# Patient Record
Sex: FEMALE | Race: WHITE | Employment: UNEMPLOYED | URBAN - METROPOLITAN AREA
[De-identification: names, ages, dates, MRNs, and addresses within clinical notes are randomized per-mention and may not be internally consistent; named-entity substitution may affect disease eponyms.]

---

## 2017-07-20 ENCOUNTER — OFFICE VISIT (OUTPATIENT)
Dept: FAMILY MEDICINE CLINIC | Age: 57
End: 2017-07-20

## 2017-07-20 VITALS
WEIGHT: 124 LBS | HEIGHT: 64 IN | RESPIRATION RATE: 16 BRPM | DIASTOLIC BLOOD PRESSURE: 64 MMHG | SYSTOLIC BLOOD PRESSURE: 122 MMHG | BODY MASS INDEX: 21.17 KG/M2 | OXYGEN SATURATION: 97 % | HEART RATE: 85 BPM

## 2017-07-20 DIAGNOSIS — Z12.4 SCREENING FOR CERVICAL CANCER: ICD-10-CM

## 2017-07-20 DIAGNOSIS — Z13.1 SCREENING FOR DIABETES MELLITUS: ICD-10-CM

## 2017-07-20 DIAGNOSIS — Z86.711 HISTORY OF PULMONARY EMBOLISM: ICD-10-CM

## 2017-07-20 DIAGNOSIS — Z80.3 FH: BREAST CANCER: ICD-10-CM

## 2017-07-20 DIAGNOSIS — E78.2 MIXED HYPERLIPIDEMIA: ICD-10-CM

## 2017-07-20 DIAGNOSIS — F17.200 SEVERE TOBACCO DEPENDENCE: ICD-10-CM

## 2017-07-20 DIAGNOSIS — R14.0 POSTPRANDIAL ABDOMINAL BLOATING: ICD-10-CM

## 2017-07-20 DIAGNOSIS — E04.2 MULTINODULAR GOITER: ICD-10-CM

## 2017-07-20 DIAGNOSIS — J43.1 PANLOBULAR EMPHYSEMA (HCC): Primary | ICD-10-CM

## 2017-07-20 LAB
ALBUMIN SERPL-MCNC: 4.2 G/DL (ref 3.4–5)
ALP BLD-CCNC: 109 U/L (ref 40–129)
ALT SERPL-CCNC: 7 U/L (ref 10–40)
AMYLASE: 41 U/L (ref 25–115)
ANION GAP SERPL CALCULATED.3IONS-SCNC: 15 MMOL/L (ref 3–16)
AST SERPL-CCNC: 17 U/L (ref 15–37)
BILIRUB SERPL-MCNC: 0.3 MG/DL (ref 0–1)
BILIRUBIN DIRECT: <0.2 MG/DL (ref 0–0.3)
BILIRUBIN, INDIRECT: ABNORMAL MG/DL (ref 0–1)
BUN BLDV-MCNC: 3 MG/DL (ref 7–20)
CALCIUM SERPL-MCNC: 9.2 MG/DL (ref 8.3–10.6)
CHLORIDE BLD-SCNC: 102 MMOL/L (ref 99–110)
CHOLESTEROL, TOTAL: 291 MG/DL (ref 0–199)
CO2: 27 MMOL/L (ref 21–32)
CREAT SERPL-MCNC: 0.5 MG/DL (ref 0.6–1.1)
GFR AFRICAN AMERICAN: >60
GFR NON-AFRICAN AMERICAN: >60
GLUCOSE BLD-MCNC: 96 MG/DL (ref 70–99)
HCT VFR BLD CALC: 42 % (ref 36–48)
HDLC SERPL-MCNC: 50 MG/DL (ref 40–60)
HEMOGLOBIN: 13.9 G/DL (ref 12–16)
LDL CHOLESTEROL CALCULATED: 208 MG/DL
MCH RBC QN AUTO: 32.3 PG (ref 26–34)
MCHC RBC AUTO-ENTMCNC: 33.1 G/DL (ref 31–36)
MCV RBC AUTO: 97.8 FL (ref 80–100)
PDW BLD-RTO: 14.4 % (ref 12.4–15.4)
PLATELET # BLD: 238 K/UL (ref 135–450)
PMV BLD AUTO: 9.7 FL (ref 5–10.5)
POTASSIUM SERPL-SCNC: 4.4 MMOL/L (ref 3.5–5.1)
RBC # BLD: 4.3 M/UL (ref 4–5.2)
SODIUM BLD-SCNC: 144 MMOL/L (ref 136–145)
TOTAL PROTEIN: 7 G/DL (ref 6.4–8.2)
TRIGL SERPL-MCNC: 165 MG/DL (ref 0–150)
TSH REFLEX FT4: 1.11 UIU/ML (ref 0.27–4.2)
VLDLC SERPL CALC-MCNC: 33 MG/DL
WBC # BLD: 5.5 K/UL (ref 4–11)

## 2017-07-20 PROCEDURE — 36415 COLL VENOUS BLD VENIPUNCTURE: CPT | Performed by: INTERNAL MEDICINE

## 2017-07-20 PROCEDURE — 94060 EVALUATION OF WHEEZING: CPT | Performed by: INTERNAL MEDICINE

## 2017-07-20 PROCEDURE — 94640 AIRWAY INHALATION TREATMENT: CPT | Performed by: INTERNAL MEDICINE

## 2017-07-20 PROCEDURE — 99214 OFFICE O/P EST MOD 30 MIN: CPT | Performed by: INTERNAL MEDICINE

## 2017-07-20 RX ORDER — BUDESONIDE AND FORMOTEROL FUMARATE DIHYDRATE 160; 4.5 UG/1; UG/1
2 AEROSOL RESPIRATORY (INHALATION) 2 TIMES DAILY
Qty: 1 INHALER | Refills: 3 | Status: SHIPPED | OUTPATIENT
Start: 2017-07-20 | End: 2017-11-28 | Stop reason: SDUPTHER

## 2017-07-20 RX ORDER — OMEPRAZOLE 20 MG/1
20 TABLET, DELAYED RELEASE ORAL DAILY
Qty: 30 TABLET | Refills: 3 | Status: SHIPPED | OUTPATIENT
Start: 2017-07-20 | End: 2018-12-11 | Stop reason: ALTCHOICE

## 2017-07-20 RX ORDER — BUPROPION HYDROCHLORIDE 150 MG/1
TABLET ORAL
Qty: 30 TABLET | Refills: 0 | Status: SHIPPED | OUTPATIENT
Start: 2017-07-20 | End: 2017-08-08 | Stop reason: SDUPTHER

## 2017-07-20 RX ORDER — ALBUTEROL SULFATE 1.25 MG/3ML
1.25 SOLUTION RESPIRATORY (INHALATION) ONCE
Status: COMPLETED | OUTPATIENT
Start: 2017-07-20 | End: 2017-07-20

## 2017-07-20 RX ADMIN — ALBUTEROL SULFATE 1.25 MG: 1.25 SOLUTION RESPIRATORY (INHALATION) at 09:58

## 2017-07-20 ASSESSMENT — PATIENT HEALTH QUESTIONNAIRE - PHQ9
SUM OF ALL RESPONSES TO PHQ9 QUESTIONS 1 & 2: 1
1. LITTLE INTEREST OR PLEASURE IN DOING THINGS: 1
SUM OF ALL RESPONSES TO PHQ QUESTIONS 1-9: 1
2. FEELING DOWN, DEPRESSED OR HOPELESS: 0

## 2017-07-21 ENCOUNTER — NURSE ONLY (OUTPATIENT)
Dept: FAMILY MEDICINE CLINIC | Age: 57
End: 2017-07-21

## 2017-07-21 DIAGNOSIS — R14.0 POSTPRANDIAL ABDOMINAL BLOATING: ICD-10-CM

## 2017-07-21 LAB
CONTROL: NORMAL
ESTIMATED AVERAGE GLUCOSE: 108.3 MG/DL
HBA1C MFR BLD: 5.4 %
HEMOCCULT STL QL: NEGATIVE

## 2017-07-21 PROCEDURE — 82274 ASSAY TEST FOR BLOOD FECAL: CPT | Performed by: INTERNAL MEDICINE

## 2017-07-23 LAB — H PYLORI ANTIGEN STOOL: NEGATIVE

## 2017-07-24 ENCOUNTER — HOSPITAL ENCOUNTER (OUTPATIENT)
Dept: ULTRASOUND IMAGING | Age: 57
Discharge: HOME | End: 2017-07-24
Attending: INTERNAL MEDICINE | Admitting: INTERNAL MEDICINE

## 2017-07-24 DIAGNOSIS — R14.0 ABDOMINAL BLOATING: Primary | ICD-10-CM

## 2017-07-24 DIAGNOSIS — E04.2 MULTINODULAR GOITER: ICD-10-CM

## 2017-07-24 DIAGNOSIS — Z80.3 FH: BREAST CANCER: ICD-10-CM

## 2017-07-24 LAB
HPV COMMENT: NORMAL
HPV TYPE 16: NOT DETECTED
HPV TYPE 18: NOT DETECTED
HPVOH (OTHER TYPES): NOT DETECTED

## 2017-07-25 PROBLEM — F17.200 TOBACCO DEPENDENCE: Status: ACTIVE | Noted: 2017-07-25

## 2017-07-25 PROBLEM — J43.1 PANLOBULAR EMPHYSEMA (HCC): Status: ACTIVE | Noted: 2017-07-25

## 2017-07-25 PROBLEM — E04.2 MULTINODULAR THYROID: Status: ACTIVE | Noted: 2017-07-25

## 2017-07-25 PROBLEM — R10.13 DYSPEPSIA: Status: ACTIVE | Noted: 2017-07-25

## 2017-07-26 DIAGNOSIS — R92.8 ABNORMAL MAMMOGRAM: Primary | ICD-10-CM

## 2017-07-31 PROBLEM — R92.8 ABNORMAL SCREENING MAMMOGRAM: Status: ACTIVE | Noted: 2017-07-31

## 2017-08-01 ENCOUNTER — OFFICE VISIT (OUTPATIENT)
Dept: FAMILY MEDICINE CLINIC | Age: 57
End: 2017-08-01

## 2017-08-01 VITALS
HEART RATE: 80 BPM | WEIGHT: 125 LBS | SYSTOLIC BLOOD PRESSURE: 118 MMHG | RESPIRATION RATE: 24 BRPM | HEIGHT: 64 IN | OXYGEN SATURATION: 96 % | DIASTOLIC BLOOD PRESSURE: 70 MMHG | BODY MASS INDEX: 21.34 KG/M2

## 2017-08-01 DIAGNOSIS — F17.200 TOBACCO DEPENDENCE: ICD-10-CM

## 2017-08-01 DIAGNOSIS — R92.8 ABNORMAL SCREENING MAMMOGRAM: ICD-10-CM

## 2017-08-01 DIAGNOSIS — R10.13 DYSPEPSIA: ICD-10-CM

## 2017-08-01 DIAGNOSIS — J43.1 PANLOBULAR EMPHYSEMA (HCC): ICD-10-CM

## 2017-08-01 DIAGNOSIS — E78.01 FAMILIAL HYPERCHOLESTEROLEMIA: Primary | ICD-10-CM

## 2017-08-01 DIAGNOSIS — E04.2 MULTINODULAR THYROID: ICD-10-CM

## 2017-08-01 PROCEDURE — 99214 OFFICE O/P EST MOD 30 MIN: CPT | Performed by: INTERNAL MEDICINE

## 2017-08-01 RX ORDER — METOPROLOL SUCCINATE 25 MG/1
TABLET, EXTENDED RELEASE ORAL
Qty: 90 TABLET | Refills: 3 | Status: SHIPPED | OUTPATIENT
Start: 2017-08-01 | End: 2017-08-01

## 2017-08-01 RX ORDER — ATORVASTATIN CALCIUM 40 MG/1
TABLET, FILM COATED ORAL
Qty: 90 TABLET | Refills: 3 | Status: SHIPPED | OUTPATIENT
Start: 2017-08-01 | End: 2017-11-13 | Stop reason: SDUPTHER

## 2017-08-14 ENCOUNTER — HOSPITAL ENCOUNTER (OUTPATIENT)
Dept: WOMENS IMAGING | Age: 57
Discharge: HOME | End: 2017-08-14
Admitting: INTERNAL MEDICINE

## 2017-08-14 DIAGNOSIS — R92.8 ABNORMAL MAMMOGRAM: ICD-10-CM

## 2017-09-05 RX ORDER — BUPROPION HYDROCHLORIDE 150 MG/1
TABLET ORAL
Qty: 60 TABLET | Refills: 0 | OUTPATIENT
Start: 2017-09-05

## 2017-09-05 RX ORDER — BUPROPION HYDROCHLORIDE 150 MG/1
TABLET ORAL
Qty: 60 TABLET | Refills: 1 | Status: SHIPPED | OUTPATIENT
Start: 2017-09-05 | End: 2017-10-02 | Stop reason: SDUPTHER

## 2017-09-18 ENCOUNTER — OFFICE VISIT (OUTPATIENT)
Dept: FAMILY MEDICINE CLINIC | Age: 57
End: 2017-09-18

## 2017-09-18 VITALS
BODY MASS INDEX: 21.17 KG/M2 | SYSTOLIC BLOOD PRESSURE: 102 MMHG | DIASTOLIC BLOOD PRESSURE: 68 MMHG | HEART RATE: 112 BPM | HEIGHT: 64 IN | OXYGEN SATURATION: 94 % | RESPIRATION RATE: 18 BRPM | WEIGHT: 124 LBS | TEMPERATURE: 98.6 F

## 2017-09-18 DIAGNOSIS — J44.1 COPD WITH EXACERBATION (HCC): Primary | ICD-10-CM

## 2017-09-18 DIAGNOSIS — J43.1 PANLOBULAR EMPHYSEMA (HCC): ICD-10-CM

## 2017-09-18 DIAGNOSIS — R10.13 DYSPEPSIA: ICD-10-CM

## 2017-09-18 DIAGNOSIS — E78.5 HYPERLIPIDEMIA, UNSPECIFIED HYPERLIPIDEMIA TYPE: ICD-10-CM

## 2017-09-18 DIAGNOSIS — E04.2 MULTINODULAR THYROID: ICD-10-CM

## 2017-09-18 LAB
ALBUMIN SERPL-MCNC: 4.2 G/DL (ref 3.4–5)
ALP BLD-CCNC: 134 U/L (ref 40–129)
ALT SERPL-CCNC: 11 U/L (ref 10–40)
AST SERPL-CCNC: 18 U/L (ref 15–37)
BILIRUB SERPL-MCNC: 0.3 MG/DL (ref 0–1)
BILIRUBIN DIRECT: <0.2 MG/DL (ref 0–0.3)
BILIRUBIN, INDIRECT: ABNORMAL MG/DL (ref 0–1)
CHOLESTEROL, TOTAL: 224 MG/DL (ref 0–199)
HDLC SERPL-MCNC: 55 MG/DL (ref 40–60)
LDL CHOLESTEROL CALCULATED: 143 MG/DL
TOTAL PROTEIN: 6.9 G/DL (ref 6.4–8.2)
TRIGL SERPL-MCNC: 132 MG/DL (ref 0–150)
VLDLC SERPL CALC-MCNC: 26 MG/DL

## 2017-09-18 PROCEDURE — 94640 AIRWAY INHALATION TREATMENT: CPT | Performed by: INTERNAL MEDICINE

## 2017-09-18 PROCEDURE — 99214 OFFICE O/P EST MOD 30 MIN: CPT | Performed by: INTERNAL MEDICINE

## 2017-09-18 PROCEDURE — 36415 COLL VENOUS BLD VENIPUNCTURE: CPT | Performed by: INTERNAL MEDICINE

## 2017-09-18 RX ORDER — ALBUTEROL SULFATE 1.25 MG/3ML
1.25 SOLUTION RESPIRATORY (INHALATION) ONCE
Status: COMPLETED | OUTPATIENT
Start: 2017-09-18 | End: 2017-09-18

## 2017-09-18 RX ORDER — PREDNISONE 20 MG/1
TABLET ORAL
Qty: 15 TABLET | Refills: 0 | Status: SHIPPED | OUTPATIENT
Start: 2017-09-18 | End: 2017-10-02 | Stop reason: ALTCHOICE

## 2017-09-18 RX ORDER — AZITHROMYCIN 250 MG/1
TABLET, FILM COATED ORAL
Qty: 1 PACKET | Refills: 0 | Status: SHIPPED | OUTPATIENT
Start: 2017-09-18 | End: 2021-04-28 | Stop reason: SDUPTHER

## 2017-09-18 RX ADMIN — ALBUTEROL SULFATE 1.25 MG: 1.25 SOLUTION RESPIRATORY (INHALATION) at 09:38

## 2017-09-19 ENCOUNTER — TELEPHONE (OUTPATIENT)
Dept: FAMILY MEDICINE CLINIC | Age: 57
End: 2017-09-19

## 2017-09-19 RX ORDER — GUAIFENESIN AND CODEINE PHOSPHATE 100; 10 MG/5ML; MG/5ML
5 SOLUTION ORAL 3 TIMES DAILY PRN
Qty: 120 ML | Refills: 0 | Status: SHIPPED | OUTPATIENT
Start: 2017-09-19 | End: 2017-09-26

## 2017-10-02 ENCOUNTER — OFFICE VISIT (OUTPATIENT)
Dept: FAMILY MEDICINE CLINIC | Age: 57
End: 2017-10-02

## 2017-10-02 VITALS
RESPIRATION RATE: 12 BRPM | HEART RATE: 77 BPM | WEIGHT: 125 LBS | BODY MASS INDEX: 21.34 KG/M2 | SYSTOLIC BLOOD PRESSURE: 112 MMHG | DIASTOLIC BLOOD PRESSURE: 64 MMHG | OXYGEN SATURATION: 98 % | HEIGHT: 64 IN

## 2017-10-02 DIAGNOSIS — R92.8 ABNORMAL SCREENING MAMMOGRAM: ICD-10-CM

## 2017-10-02 DIAGNOSIS — R10.13 DYSPEPSIA: ICD-10-CM

## 2017-10-02 DIAGNOSIS — Z23 NEED FOR INFLUENZA VACCINATION: ICD-10-CM

## 2017-10-02 DIAGNOSIS — E04.2 MULTINODULAR THYROID: ICD-10-CM

## 2017-10-02 DIAGNOSIS — F17.200 TOBACCO DEPENDENCE: ICD-10-CM

## 2017-10-02 DIAGNOSIS — J43.1 PANLOBULAR EMPHYSEMA (HCC): Primary | ICD-10-CM

## 2017-10-02 PROCEDURE — 99214 OFFICE O/P EST MOD 30 MIN: CPT | Performed by: INTERNAL MEDICINE

## 2017-10-02 PROCEDURE — 90688 IIV4 VACCINE SPLT 0.5 ML IM: CPT | Performed by: INTERNAL MEDICINE

## 2017-10-02 PROCEDURE — 90471 IMMUNIZATION ADMIN: CPT | Performed by: INTERNAL MEDICINE

## 2017-10-02 RX ORDER — BUPROPION HYDROCHLORIDE 150 MG/1
TABLET ORAL
Qty: 60 TABLET | Refills: 1 | Status: SHIPPED | OUTPATIENT
Start: 2017-10-02 | End: 2017-11-28 | Stop reason: SDUPTHER

## 2017-10-02 NOTE — PROGRESS NOTES
Vaccine Information Sheet, \"Influenza - Inactivated\"  given to Tamy Gonzalez, or parent/legal guardian of  Tamy Gonzalez and verbalized understanding. Patient responses:    Have you ever had a reaction to a flu vaccine? No  Are you able to eat eggs without adverse effects? Yes  Do you have any current illness? No  Have you ever had Guillian [place] Syndrome? No    Flu vaccine given per order. Please see immunization tab.       Given in left deltoid-BR

## 2017-10-02 NOTE — PATIENT INSTRUCTIONS
Learning About COPD Triggers  What are triggers? When you have COPD (chronic obstructive pulmonary disease), certain things can make your symptoms worse. These are called triggers. They include:  · Cigarette smoke or air pollution. · Illnesses like colds, flu, or pneumonia. · Cleaning supplies or other chemicals. · Gases, particles, or fumes from wood or kerosene home heaters. Not all people have the same triggers. What may cause symptoms in one person may not be a problem for another person. How do triggers affect COPD? Triggers can make it harder for your lungs to work as they should and can lead to sudden difficulty breathing and other symptoms. When you are around a trigger, a COPD flare-up is more likely. If your symptoms are severe, you may need emergency treatment or have to go to the hospital for treatment. If you know what your triggers are and can avoid them, you can reduce how often you have flare-ups and how much COPD affects your life. It's also important to be active and to take your daily medicines as prescribed. This helps prevent flare-ups too. What can you do to avoid triggers? The first thing is to know your triggers. When you are having symptoms, note the things around you that might be causing them. Then look for patterns in what may be triggering your symptoms. When you have your list of possible triggers, work with your doctor to find ways to avoid them. Here are some ways to avoid a few common triggers. · Do not smoke or allow others to smoke around you. If you need help quitting, talk to your doctor about stop-smoking programs and medicines. These can increase your chances of quitting for good. · If there is a lot of pollution, pollen, or dust outside, stay at home and keep your windows closed. Use an air conditioner or air filter in your home. Check your local weather report or newspaper for air quality and pollen reports. · Get a flu vaccine every year.  Talk to your doctor about getting a pneumococcal shot. Wash your hands often to prevent infections. How can you manage a flare-up? Do not panic if you start to have a COPD flare-up. · If you have a COPD action plan, follow the plan. In general:  ¨ Use your quick-relief inhaler as directed by your doctor. If your symptoms do not get better after you use your medicine, have someone take you to the emergency room. Call an ambulance if needed. ¨ Use a spacer with your metered-dose inhaler (MDI). If you have a nebulizer for inhaled medicine, use it. A spacer or nebulizer may help get more medicine to your lungs. ¨ If your doctor has given you other inhaled medicines or steroid pills, take them as directed. ¨ If your doctor has given you a prescription for an antibiotic, fill it if you need to. ¨ Call your doctor if you have to use your antibiotic or steroid pills. Where can you learn more? Go to https://Girly Stuff.YourSports. org and sign in to your Zarfo account. Enter U496 in the Identification Solutions box to learn more about \"Learning About COPD Triggers. \"     If you do not have an account, please click on the \"Sign Up Now\" link. Current as of: March 25, 2017  Content Version: 11.3  © 4697-2428 Lovejuice. Care instructions adapted under license by [facility] ([facility]). If you have questions about a medical condition or this instruction, always ask your healthcare professional. [place] [address] any warranty or liability for your use of this information. Chronic Obstructive Pulmonary Disease (COPD): Care Instructions  Your Care Instructions    Chronic obstructive pulmonary disease (COPD) is a general term for a group of lung diseases, including emphysema and chronic bronchitis. People with COPD have decreased airflow in and out of the lungs, which makes it hard to breathe. The airways also can get clogged with thick mucus. Cigarette smoking is a major cause of COPD.   Although there example, call if:  · You have severe trouble breathing. Call your doctor now or seek immediate medical care if:  · You have new or worse trouble breathing. · You cough up blood. · You have a fever. Watch closely for changes in your health, and be sure to contact your doctor if:  · You cough more deeply or more often, especially if you notice more mucus or a change in the color of your mucus. · You have new or worse swelling in your legs or belly. · You are not getting better as expected. Where can you learn more? Go to https://Cardiolapepiceweb.This Week In. org and sign in to your Mozzo Analytics account. Enter G635 in the [employer] box to learn more about \"Chronic Obstructive Pulmonary Disease (COPD): Care Instructions. \"     If you do not have an account, please click on the \"Sign Up Now\" link. Current as of: March 25, 2017  Content Version: 11.3  © 9841-1395 Syndero. Care instructions adapted under license by [facility] ([facility]). If you have questions about a medical condition or this instruction, always ask your healthcare professional. [place] [address] any warranty or liability for your use of this information. Adjustment Disorder: Care Instructions  Your Care Instructions  Adjustment disorder means that you have emotional or behavioral problems because of stress. But your response to the stress is far more severe than a normal response. It is severe enough to affect your work or social life and may cause depression and physical pains and problems. Events that may cause this response can include a divorce, money problems, or starting school or a new job. It might be anything that causes some stress. This disorder is most often a short-term problem. It happens within 3 months of the stressful event or change. If the response lasts longer than 6 months after the event ends, you may have a more serious disorder.   Follow-up care is a key part of your treatment and

## 2017-10-02 NOTE — MR AVS SNAPSHOT
After Visit Summary             Shakila Ramsey   10/2/2017 8:00 AM   Office Visit    Description:  Female : 1960   Provider:  Villa Enriquez MD   Department:  [address] and Future Appointments         Below is a list of your follow-up and future appointments. This may not be a complete list as you may have made appointments directly with providers that we are not aware of or your providers may have made some for you. Please call your providers to confirm appointments. It is important to keep your appointments. Please bring your current insurance card, photo ID, co-pay, and all medication bottles to your appointment. If self-pay, payment is expected at the time of service. Your To-Do List     Future Appointments Provider Department Dept Phone    10/30/2017 9:30 AM Italo Geiger MD; [address] [facility] [phone]    Follow-Up    Return in about 6 weeks (around 2017). Information from Your Visit        Department     Name Address Phone Fax    [address] [facility], [place] [address] [phone]      You Were Seen for:         Comments    Panlobular emphysema [facility])   [[id number]]         Vital Signs     Blood Pressure Pulse Respirations Height Weight Oxygen Saturation    112/64 77 12 5' 4\" (1.626 m) 125 lb (56.7 kg) 98%    Body Mass Index Smoking Status                21.46 kg/m2 Current Every Day Smoker          Instructions         Learning About COPD Triggers  What are triggers? When you have COPD (chronic obstructive pulmonary disease), certain things can make your symptoms worse. These are called triggers. They include:  · Cigarette smoke or air pollution. · Illnesses like colds, flu, or pneumonia. · Cleaning supplies or other chemicals. · Gases, particles, or fumes from wood or kerosene home heaters. Not all people have the same triggers. What may cause symptoms in one person may not be a problem for another person. How do triggers affect COPD? Triggers can make it harder for your lungs to work as they should and can lead to sudden difficulty breathing and other symptoms. When you are around a trigger, a COPD flare-up is more likely. If your symptoms are severe, you may need emergency treatment or have to go to the hospital for treatment. If you know what your triggers are and can avoid them, you can reduce how often you have flare-ups and how much COPD affects your life. It's also important to be active and to take your daily medicines as prescribed. This helps prevent flare-ups too. What can you do to avoid triggers? The first thing is to know your triggers. When you are having symptoms, note the things around you that might be causing them. Then look for patterns in what may be triggering your symptoms. When you have your list of possible triggers, work with your doctor to find ways to avoid them. Here are some ways to avoid a few common triggers. · Do not smoke or allow others to smoke around you. If you need help quitting, talk to your doctor about stop-smoking programs and medicines. These can increase your chances of quitting for good. · If there is a lot of pollution, pollen, or dust outside, stay at home and keep your windows closed. Use an air conditioner or air filter in your home. Check your local weather report or newspaper for air quality and pollen reports. · Get a flu vaccine every year. Talk to your doctor about getting a pneumococcal shot. Wash your hands often to prevent infections. How can you manage a flare-up? Do not panic if you start to have a COPD flare-up. · If you have a COPD action plan, follow the plan. In general:  ¨ Use your quick-relief inhaler as directed by your doctor.  If your symptoms do not get better after you use your medicine, have someone take · Oxygen therapy boosts the amount of oxygen in your blood and helps you breathe easier. Use the flow rate your doctor has recommended, and do not change it without talking to your doctor first.  Activity  · Get regular exercise. Walking is an easy way to get exercise. Start out slowly, and walk a little more each day. · Pay attention to your breathing. You are exercising too hard if you cannot talk while you are exercising. · Take short rest breaks when doing household chores and other activities. · Learn breathing methodssuch as breathing through pursed lipsto help you become less short of breath. · If your doctor has not set you up with a pulmonary rehabilitation program, talk to him or her about whether rehab is right for you. Rehab includes exercise programs, education about your disease and how to manage it, help with diet and other changes, and emotional support. Diet  · Eat regular, healthy meals. Use bronchodilators about 1 hour before you eat to make it easier to eat. Eat several small meals instead of three large ones. Drink beverages at the end of the meal. Avoid foods that are hard to chew. · Eat foods that contain protein so that you do not lose muscle mass. · Talk with your doctor if you gain too much weight or if you lose weight without trying. Mental health  · Talk to your family, friends, or a therapist about your feelings. It is normal to feel frightened, angry, hopeless, helpless, and even guilty. Talking openly about bad feelings can help you cope. If these feelings last, talk to your doctor. When should you call for help? Call 911 anytime you think you may need emergency care. For example, call if:  · You have severe trouble breathing. Call your doctor now or seek immediate medical care if:  · You have new or worse trouble breathing. · You cough up blood. · You have a fever.   Watch closely for changes in your health, and be sure to contact your doctor if: · You cough more deeply or more often, especially if you notice more mucus or a change in the color of your mucus. · You have new or worse swelling in your legs or belly. · You are not getting better as expected. Where can you learn more? Go to https://asiya.HN Discounts Corporation. org and sign in to your Tackkt account. Enter I777 in the skyrockit box to learn more about \"Chronic Obstructive Pulmonary Disease (COPD): Care Instructions. \"     If you do not have an account, please click on the \"Sign Up Now\" link. Current as of: March 25, 2017  Content Version: 11.3  © 5428-7061 Smart Living Studios. Care instructions adapted under license by [facility] ([facility]). If you have questions about a medical condition or this instruction, always ask your healthcare professional. [address] any warranty or liability for your use of this information. Adjustment Disorder: Care Instructions  Your Care Instructions  Adjustment disorder means that you have emotional or behavioral problems because of stress. But your response to the stress is far more severe than a normal response. It is severe enough to affect your work or social life and may cause depression and physical pains and problems. Events that may cause this response can include a divorce, money problems, or starting school or a new job. It might be anything that causes some stress. This disorder is most often a short-term problem. It happens within 3 months of the stressful event or change. If the response lasts longer than 6 months after the event ends, you may have a more serious disorder. Follow-up care is a key part of your treatment and safety. Be sure to make and go to all appointments, and call your doctor if you are having problems. It's also a good idea to know your test results and keep a list of the medicines you take. How can you care for yourself at home? 9. Click Sign Up. You can now view your medical record. Additional Information  If you have questions, please contact the physician practice where you receive care. Remember, [employer] is NOT to be used for urgent needs. For medical emergencies, dial 911. For questions regarding your Double the Donation account call [phone]. If you have a clinical question, please call your doctor's office.

## 2017-10-02 NOTE — PROGRESS NOTES
HPI: Vince Self presents for follow up up breathing. Plan lobar emphysema with FEV1 1.04. Takes Symbicort daily. Exacerbation improved within 48 hours of prednisone using antibiotic. She's gone from 2 packs a day of tobacco to one every 2-3 days. Positive postnasal drip. No hemoptysis. No more palpitations. Feeling more like her normal self. Some exercise. Son is no longer ill. Plans to continue cutting back. agrees to flu vaccine. No adverse effects of bupropion. History of unprovoked pulmonary embolism transit Coumadin use. No family history of coagulopathy. No recurrent issues. . Had a CTA in May 2017 which showed multilobar emphysema.       Abdominal bloating post prandial for years x 30 years. Is intermittent. Emesis at least monthly. Stools loose. No blood in stool. No nocturnal stools. Post prandial stools. 3 stools daily. No weight loss. No alcohol. No anemia. Is following up with GI later this month. Continue PPI.       Native LDL of 200 down to 134 after 6 weeks of statin. No adverse effect. Liver functions stable.      History of depression. Underwent outpatient therapy 5 years ago. States bupropion has not made any impact.       vaginal delivery without complication. No gestational diabetes or hypertension. Tubal ligation. Early menopause. Last Pap 16 was normal. Sexually active. Sister with breast cancer. No family history of ovarian cancer BI-RADS 3 mammogram. Recheck 2018     Euthyroid multinodular goiter.           PMH:    Childbirth     PE  20's     pneumonia     Tubal      SH:  son and grandson at home.  + tobacco Positive exercise.      FH 3 brother 3 sisters    + breast cancer in sister 2 [facility]     -colon, ovarian cancer      View of systems: No falls. No concerns with memory issues. No seizures. Edentulous. Due eye exam. No known cataracts. Bloating, loose stools, wheeze, postnasal drip, worry and depression. Intermittent vomiting. No GYN concerns.  No chest pain, palpitations, syncope.       12 point ROS reviewed and negative other than mentioned above  Allergies   Allergen Reactions    Pcn [Penicillins]        Outpatient Prescriptions Marked as Taking for the 10/2/17 encounter (Office Visit) with Norma Duggan MD   Medication Sig Dispense Refill    buPROPion (WELLBUTRIN XL) 150 MG extended release tablet 1 po bid due follow up on medication 60 tablet 1    atorvastatin (LIPITOR) 40 MG tablet 1 po q day follow up on medication fasting in 2 months 90 tablet 3    omeprazole (PRILOSEC OTC) 20 MG tablet Take 1 tablet by mouth daily 30 tablet 3    budesonide-formoterol (SYMBICORT) 160-4.5 MCG/ACT AERO Inhale 2 puffs into the lungs 2 times daily 1 Inhaler 3    albuterol sulfate  (90 BASE) MCG/ACT inhaler Use 2 puffs 4 times daily for 7 days then as needed for wheezing. Dispense with Spacer and instruct in use. At patient's preference may use 60 dose MDI. May Sub Pro-Air or Proventil as needed per insurance. 1 Inhaler 1             Past Medical History:   Diagnosis Date    Abnormal screening mammogram 7/31/2017    Dx right pending    Anxiety     Depression     Multinodular thyroid 7/25/2017 7.2017 euthyroid recheck 1 year       Past Surgical History:   Procedure Laterality Date    TONSILLECTOMY           Social History     Social History    Marital status:      Spouse name: N/A    Number of children: N/A    Years of education: N/A     Occupational History    Not on file. Social History Main Topics    Smoking status: Current Every Day Smoker     Packs/day: 3.00     Types: Cigarettes    Smokeless tobacco: Never Used    Alcohol use No    Drug use: No    Sexual activity: Yes     Partners: Male     Other Topics Concern    Not on file     Social History Narrative       No family history on file.         Review of Systems    Objective     /64  Pulse 77  Resp 12  Ht 5' 4\" (1.626 m)  Wt 125 lb (56.7 kg)  SpO2 98% Comment: MACKENZIE BMI 21.46 kg/m2    SpO2 Readings from Last 3 Encounters:   10/02/17 98%   09/18/17 94%   08/01/17 96%       Wt Readings from Last 3 Encounters:   10/02/17 125 lb (56.7 kg)   09/18/17 124 lb (56.2 kg)   08/01/17 125 lb (56.7 kg)       Physical Exam     NAD alert and cooperative  HEENT:Edentulous. Mild erythema posterior pharynx. No anterior cervical adenopathy. Positive goiter. Lungs increased I:E ratio with decreased breath sounds. No wheezes rales or rhonchi  Cardiovascular exam regular rate and rhythm without murmur  No hepatosplenomegaly. No point tenderness. No peripheral edema or cords. No suspicious skin rashes. Or nodules.       Chemistry        Component Value Date/Time     07/20/2017 0945    K 4.4 07/20/2017 0945     07/20/2017 0945    CO2 27 07/20/2017 0945    BUN 3 (L) 07/20/2017 0945    CREATININE 0.5 (L) 07/20/2017 0945        Component Value Date/Time    CALCIUM 9.2 07/20/2017 0945    ALKPHOS 134 (H) 09/18/2017 0901    AST 18 09/18/2017 0901    ALT 11 09/18/2017 0901    BILITOT 0.3 09/18/2017 0901            Lab Results   Component Value Date    WBC 5.5 07/20/2017    HGB 13.9 07/20/2017    HCT 42.0 07/20/2017    MCV 97.8 07/20/2017     07/20/2017     Lab Results   Component Value Date    LABA1C 5.4 07/20/2017     Lab Results   Component Value Date    .3 07/20/2017     Lab Results   Component Value Date    LABA1C 5.4 07/20/2017     No components found for: CHLPL  Lab Results   Component Value Date    TRIG 132 09/18/2017    TRIG 165 (H) 07/20/2017    TRIG 182 (H) 03/13/2015     Lab Results   Component Value Date    HDL 55 09/18/2017    HDL 50 07/20/2017    HDL 49 03/13/2015     Lab Results   Component Value Date    LDLCALC 143 (H) 09/18/2017    LDLCALC 208 (H) 07/20/2017    LDLCALC 183 (H) 03/13/2015     Lab Results   Component Value Date    LABVLDL 26 09/18/2017    LABVLDL 33 07/20/2017    LABVLDL 36 03/13/2015       Old labs and records reviewed or requested  Discussed past

## 2017-10-26 ENCOUNTER — PAT TELEPHONE (OUTPATIENT)
Dept: PREADMISSION TESTING | Age: 57
End: 2017-10-26

## 2017-10-26 ENCOUNTER — SURG/PROC ORDERS (OUTPATIENT)
Dept: ANESTHESIOLOGY | Age: 57
End: 2017-10-26

## 2017-10-26 VITALS — WEIGHT: 125 LBS | BODY MASS INDEX: 21.34 KG/M2 | HEIGHT: 64 IN

## 2017-10-26 RX ORDER — SODIUM CHLORIDE 0.9 % (FLUSH) 0.9 %
10 SYRINGE (ML) INJECTION PRN
Status: CANCELLED | OUTPATIENT
Start: 2017-10-26

## 2017-10-26 RX ORDER — SODIUM CHLORIDE 0.9 % (FLUSH) 0.9 %
10 SYRINGE (ML) INJECTION EVERY 12 HOURS SCHEDULED
Status: CANCELLED | OUTPATIENT
Start: 2017-10-26

## 2017-10-26 RX ORDER — SODIUM CHLORIDE 9 MG/ML
INJECTION, SOLUTION INTRAVENOUS CONTINUOUS
Status: CANCELLED | OUTPATIENT
Start: 2017-10-26

## 2017-10-26 NOTE — PRE-PROCEDURE INSTRUCTIONS
[address]  time____8 pt states________        Surgery time____________    Take the following medications with a sip of water: prilosec and inhalers    Do not eat or drink anything after 12:00 midnight prior to your surgery. This includes water chewing gum, mints and ice chips. You may brush your teeth and gargle the morning of your surgery, but do not swallow the water     Please see your family doctor/pediatrician for a history and physical and/or concerning medications. Bring any test results/reports from your physicians office. If you are under the care of a heart doctor or specialist doctor, please be aware that you may be asked to them for clearance    You may be asked to stop blood thinners such as Coumadin, Plavix, Fragmin, Lovenox, etc., or any anti-inflammatories such as:  Aspirin, Ibuprofen, Advil, Naproxen prior to your surgery. We also ask that you stop any OTC medications such as fish oil, vitamin E, glucosamine, garlic, Multivitamins, COQ 10, etc.    We ask that you do not smoke 24 hours prior to surgery  We ask that you do not  drink any alcoholic beverages 24 hours prior to surgery     You must make arrangements for a responsible adult to take you home after your surgery. For your safety you will not be allowed to leave alone or drive yourself home. Your surgery will be cancelled if you do not have a ride home. Also for your safety, it is strongly suggested that someone stay with you the first 24 hours after your surgery. A parent or legal guardian must accompany a child scheduled for surgery and plan to stay at the hospital until the child is discharged. Please do not bring other children with you. For your comfort, please wear simple loose fitting clothing to the hospital.  Please do not bring valuables.     Do not wear any make-up or nail polish on your fingers or toes      For your safety, please do not wear any jewelry

## 2017-10-30 ENCOUNTER — HOSPITAL ENCOUNTER (OUTPATIENT)
Dept: ENDOSCOPY | Age: 57
Discharge: HOME | End: 2017-10-30
Attending: INTERNAL MEDICINE | Admitting: INTERNAL MEDICINE

## 2017-10-30 VITALS
DIASTOLIC BLOOD PRESSURE: 62 MMHG | RESPIRATION RATE: 16 BRPM | HEIGHT: 64 IN | SYSTOLIC BLOOD PRESSURE: 114 MMHG | TEMPERATURE: 97.6 F | BODY MASS INDEX: 21.19 KG/M2 | WEIGHT: 124.12 LBS | HEART RATE: 91 BPM | OXYGEN SATURATION: 94 %

## 2017-10-30 PROBLEM — K63.5 COLON POLYPS: Status: ACTIVE | Noted: 2017-10-30

## 2017-10-30 RX ORDER — FENTANYL CITRATE 50 UG/ML
25 INJECTION, SOLUTION INTRAMUSCULAR; INTRAVENOUS EVERY 5 MIN PRN
Status: DISCONTINUED | OUTPATIENT
Start: 2017-10-30 | End: 2017-10-31 | Stop reason: HOSPADM

## 2017-10-30 RX ORDER — MORPHINE SULFATE 2 MG/ML
2 INJECTION, SOLUTION INTRAMUSCULAR; INTRAVENOUS EVERY 5 MIN PRN
Status: DISCONTINUED | OUTPATIENT
Start: 2017-10-30 | End: 2017-10-31 | Stop reason: HOSPADM

## 2017-10-30 RX ORDER — MEPERIDINE HYDROCHLORIDE 25 MG/ML
12.5 INJECTION INTRAMUSCULAR; INTRAVENOUS; SUBCUTANEOUS EVERY 5 MIN PRN
Status: DISCONTINUED | OUTPATIENT
Start: 2017-10-30 | End: 2017-10-31 | Stop reason: HOSPADM

## 2017-10-30 RX ORDER — ONDANSETRON 2 MG/ML
4 INJECTION INTRAMUSCULAR; INTRAVENOUS
Status: ACTIVE | OUTPATIENT
Start: 2017-10-30 | End: 2017-10-30

## 2017-10-30 RX ORDER — MORPHINE SULFATE 2 MG/ML
1 INJECTION, SOLUTION INTRAMUSCULAR; INTRAVENOUS EVERY 5 MIN PRN
Status: DISCONTINUED | OUTPATIENT
Start: 2017-10-30 | End: 2017-10-31 | Stop reason: HOSPADM

## 2017-10-30 RX ORDER — OXYCODONE HYDROCHLORIDE AND ACETAMINOPHEN 5; 325 MG/1; MG/1
1 TABLET ORAL PRN
Status: ACTIVE | OUTPATIENT
Start: 2017-10-30 | End: 2017-10-30

## 2017-10-30 RX ORDER — SODIUM CHLORIDE 9 MG/ML
INJECTION, SOLUTION INTRAVENOUS CONTINUOUS
Status: DISCONTINUED | OUTPATIENT
Start: 2017-10-30 | End: 2017-10-31 | Stop reason: HOSPADM

## 2017-10-30 RX ORDER — OXYCODONE HYDROCHLORIDE AND ACETAMINOPHEN 5; 325 MG/1; MG/1
2 TABLET ORAL PRN
Status: ACTIVE | OUTPATIENT
Start: 2017-10-30 | End: 2017-10-30

## 2017-10-30 RX ORDER — SODIUM CHLORIDE 0.9 % (FLUSH) 0.9 %
10 SYRINGE (ML) INJECTION PRN
Status: DISCONTINUED | OUTPATIENT
Start: 2017-10-30 | End: 2017-10-31 | Stop reason: HOSPADM

## 2017-10-30 RX ORDER — SODIUM CHLORIDE 0.9 % (FLUSH) 0.9 %
10 SYRINGE (ML) INJECTION EVERY 12 HOURS SCHEDULED
Status: DISCONTINUED | OUTPATIENT
Start: 2017-10-30 | End: 2017-10-31 | Stop reason: HOSPADM

## 2017-10-30 RX ORDER — FENTANYL CITRATE 50 UG/ML
50 INJECTION, SOLUTION INTRAMUSCULAR; INTRAVENOUS EVERY 5 MIN PRN
Status: DISCONTINUED | OUTPATIENT
Start: 2017-10-30 | End: 2017-10-31 | Stop reason: HOSPADM

## 2017-10-30 RX ADMIN — SODIUM CHLORIDE: 9 INJECTION, SOLUTION INTRAVENOUS at 08:27

## 2017-10-30 ASSESSMENT — PAIN DESCRIPTION - DESCRIPTORS: DESCRIPTORS: CRAMPING

## 2017-10-30 ASSESSMENT — PAIN SCALES - GENERAL: PAINLEVEL_OUTOF10: 0

## 2017-10-30 ASSESSMENT — LIFESTYLE VARIABLES: SMOKING_STATUS: 1

## 2017-10-30 ASSESSMENT — ENCOUNTER SYMPTOMS: SHORTNESS OF BREATH: 0

## 2017-10-30 ASSESSMENT — PAIN - FUNCTIONAL ASSESSMENT: PAIN_FUNCTIONAL_ASSESSMENT: 0-10

## 2017-10-30 NOTE — ANESTHESIA POST-OP
[facility] Department of Anest[facility]iology  Post-Anesthesia Note       Name:  Leah Hammond                                         Age:  61 y.o.   MRN:  [id number]     Last Vitals & Oxygen Saturation: /62   Pulse 91   Temp 97.6 °F (36.4 °C) (Temporal)   Resp 16   Ht 5' 4\" (1.626 m)   Wt 124 lb 1.9 oz (56.3 kg)   SpO2 94%   BMI 21.30 kg/m²   Vitals:    10/30/17 1041 10/30/17 1046 10/30/17 1101 10/30/17 1117   BP: (!) 92/53 92/67 109/70 114/62   Pulse: 95 91 98 91   Resp: 22 20 16 16   Temp:    97.6 °F (36.4 °C)   TempSrc:    Temporal   SpO2: 100% 100% 98% 94%   Weight:       Height:           Level of consciousness: awake, alert and oriented    Respiratory: stable     Cardiovascular: stable     Hydration: stable     PONV: stable     Post-op pain: adequate analgesia    Post-op assessment: no apparent anesthetic complications and tolerated procedure well    Complications:  none    Laurie Trevino MD  October 30, 2017   12:03 PM

## 2017-10-30 NOTE — OP NOTE
disease. The scope was then withdrawn back into the stomach, it was decompressed, and the scope was completely withdrawn. A digital rectal examination was performed and revealed negative without mass, lesions or tenderness. The Olympus video colonoscope was placed in the patient's rectum under digital direction and advanced to the cecum. The cecum was identified by characteristic anatomy and ballottment. The prep was excellent. The ileocecal valve was identified. The terminal ileum was normal appearing. The scope was then withdrawn back through the cecum, ascending, transverse, descending, sigmoid colon, and rectum. Careful circumferential examination of the mucosa in these areas demonstrated:    1. A 3 mm polyp in the ascending colon removed completely with biopsy polypectomy. 2. A 5 mm polyp in the ascending colon removed completely with cold snare polypectomy. 3. A 3 mm polyp in the transverse colon removed completely with biopsy polypectomy. 4. A 3 mm polyp in the transverse colon removed completely with biopsy polypectomy. 5. A 3 mm polyp in the rectum removed completely with biopsy polypectomy. The scope was then withdrawn into the rectum and retroflexed. The retroflexed view of the anal verge and rectum demonstrates normal rectum. The scope was straightened, the colon was decompressed and the scope was withdrawn from the patient. The patient tolerated the procedure well and was taken to the PACU in good condition. Impression:    1) See post procedure diagnoses    Recommendations:   1. Await pathology results. 2. If biopsies negative for Celiac disease and upper abdominal discomfort/bloating persist would perform a H2 breath test to assess for SIBO. 3. Recommend repeat Colonoscopy results in 3 years for screening purposes.    4. To attempt to limit adenomas: would avoid smoking, avoid more than 1 alcoholic drink daily, engage in regular physical activity, avoid obesity,

## 2017-10-30 NOTE — ANESTHESIA PRE-OP
Department of Anesthesiology  Preprocedure Note       Name:  Kourtney Edwards   Age:  61 y.o.  :  1960                                          MRN:  [id number]         Date:  10/30/2017      [facility] Department of Anesthesiology  Pre-Anesthesia Evaluation/Consultation       Name:  Kourtney Edwards                                         Age:  61 y.o. MRN:  [id number]           Procedure (Scheduled):  egd / colonoscopy  Surgeon:  Dr. Kay Chun [P[facility]illins]      Patient Active Problem List   Diagnosis    Multinodular thyroid    Panlobular emphysema ([address]    Dyspepsia    Tobacco dependence    Abnormal screening mammogram     Past Medical History:   Diagnosis Date    Abnormal screening mammogram 2017    Dx right pending    Anxiety     Arthritis     Asthma     Depression     Multinodular thyroid 2017 euthyroid recheck 1 year     Past Surgical History:   Procedure Laterality Date    TONSILLECTOMY      TUBAL LIGATION       Social History   Substance Use Topics    Smoking status: Current Every Day Smoker     Packs/day: 3.00     Years: 50.00     Types: Cigarettes    Smokeless tobacco: Never Used      Comment: pts down to pack a day.  Alcohol use No     Medications  Current Outpatient Prescriptions on File Prior to Encounter   Medication Sig Dispense Refill    buPROPion (WELLBUTRIN XL) 150 MG extended release tablet 1 po bid due follow up on medication 60 tablet 1    atorvastatin (LIPITOR) 40 MG tablet 1 po q day follow up on medication fasting in 2 months 90 tablet 3    omeprazole (PRILOSEC OTC) 20 MG tablet Take 1 tablet by mouth daily 30 tablet 3    budesonide-formoterol (SYMBICORT) 160-4.5 MCG/ACT AERO Inhale 2 puffs into the lungs 2 times daily 1 Inhaler 3    albuterol sulfate  (90 BASE) MCG/ACT inhaler Use 2 puffs 4 times daily for 7 days then as needed for wheezing. Dispense with Spacer and instruct in use.   At patient's Max taken time: 10/30/17 0815  SpO2  Av %  Min: 98 %   Min taken time: 10/30/17 0815  Max: 98 %   Max taken time: 10/30/17 0815    BP Readings from Last 3 Encounters:   10/30/17 100/69   10/02/17 112/64   17 102/68     BMI  Body mass index is 21.3 kg/m². Estimated body mass index is 21.3 kg/m² as calculated from the following:    Height as of this encounter: 5' 4\" (1.626 m). Weight as of this encounter: 124 lb 1.9 oz (56.3 kg). CBC   Lab Results   Component Value Date    WBC 5.5 2017    RBC 4.30 2017    HGB 13.9 2017    HCT 42.0 2017    MCV 97.8 2017    RDW 14.4 2017     2017     CMP    Lab Results   Component Value Date     2017    K 4.4 2017     2017    CO2 27 2017    BUN 3 2017    CREATININE 0.5 2017    GFRAA >60 2017    GFRAA >60 2013    AGRATIO 1.3 2017    LABGLOM >60 2017    GLUCOSE 96 2017    PROT 6.9 2017    CALCIUM 9.2 2017    BILITOT 0.3 2017    ALKPHOS 134 2017    AST 18 2017    ALT 11 2017     BMP    Lab Results   Component Value Date     2017    K 4.4 2017     2017    CO2 27 2017    BUN 3 2017    CREATININE 0.5 2017    CALCIUM 9.2 2017    GFRAA >60 2017    GFRAA >60 2013    LABGLOM >60 2017    GLUCOSE 96 2017     POCGlucose  No results for input(s): GLUCOSE in the last 72 hours. Coags  No results found for: PROTIME, INR, APTT  HCG (If Applicable) No results found for: PREGTESTUR, PREGSERUM, HCG, HCGQUANT   ABGs No results found for: PHART, PO2ART, SOZ5XEZ, LRC1TCE, BEART, D2TTAFWV   Type & Screen (If Applicable)  No results found for: LABABO, [address]    Surgeon:    Procedure:    Medications prior to admission:   Prior to Admission medications    Medication Sig Start Date End Date Taking?  Authorizing Provider   buPROPion (WELLBUTRIN XL) 150 MG

## 2017-10-30 NOTE — H&P
without wheezes. Clear to auscultation  Cardiac:regular rate and rhythm without loud murmurs  Abdomen:soft, nontender,  Bowel sounds present    Pre-Procedure Assessment / Plan:  1) EGD/Colonoscopy    ASA Grade:  ASA 3 - Patient with moderate systemic disease with functional limitations  Mallampati Classification:  Class II    Level of Sedation Plan:Deep sedation    Post Procedure plan: Return to same level of care    I assessed the patient and find that the patient is in satisfactory condition to proceed with the planned procedure and sedation plan. I have explained the risk, benefits, and alternatives to the procedure; the patient understands and agrees to proceed.        Lester Maher MD  10/30/2017

## 2017-11-13 ENCOUNTER — OFFICE VISIT (OUTPATIENT)
Dept: FAMILY MEDICINE CLINIC | Age: 57
End: 2017-11-13

## 2017-11-13 VITALS
HEART RATE: 80 BPM | SYSTOLIC BLOOD PRESSURE: 128 MMHG | WEIGHT: 126 LBS | HEIGHT: 64 IN | BODY MASS INDEX: 21.51 KG/M2 | DIASTOLIC BLOOD PRESSURE: 60 MMHG | RESPIRATION RATE: 16 BRPM | OXYGEN SATURATION: 97 %

## 2017-11-13 DIAGNOSIS — E04.2 MULTINODULAR THYROID: ICD-10-CM

## 2017-11-13 DIAGNOSIS — Z86.010 HX OF ADENOMATOUS POLYP OF COLON: ICD-10-CM

## 2017-11-13 DIAGNOSIS — F17.200 TOBACCO DEPENDENCE: ICD-10-CM

## 2017-11-13 DIAGNOSIS — J43.1 PANLOBULAR EMPHYSEMA (HCC): Primary | ICD-10-CM

## 2017-11-13 DIAGNOSIS — Z80.3 FH: BREAST CANCER IN FIRST DEGREE RELATIVE: ICD-10-CM

## 2017-11-13 DIAGNOSIS — E78.01 FAMILIAL HYPERCHOLESTEROLEMIA: ICD-10-CM

## 2017-11-13 PROBLEM — Z86.0101 HX OF ADENOMATOUS POLYP OF COLON: Status: ACTIVE | Noted: 2017-10-30

## 2017-11-13 PROCEDURE — 3017F COLORECTAL CA SCREEN DOC REV: CPT | Performed by: INTERNAL MEDICINE

## 2017-11-13 PROCEDURE — G8427 DOCREV CUR MEDS BY ELIG CLIN: HCPCS | Performed by: INTERNAL MEDICINE

## 2017-11-13 PROCEDURE — 99214 OFFICE O/P EST MOD 30 MIN: CPT | Performed by: INTERNAL MEDICINE

## 2017-11-13 PROCEDURE — 3014F SCREEN MAMMO DOC REV: CPT | Performed by: INTERNAL MEDICINE

## 2017-11-13 PROCEDURE — G8484 FLU IMMUNIZE NO ADMIN: HCPCS | Performed by: INTERNAL MEDICINE

## 2017-11-13 PROCEDURE — G8420 CALC BMI NORM PARAMETERS: HCPCS | Performed by: INTERNAL MEDICINE

## 2017-11-13 PROCEDURE — 4004F PT TOBACCO SCREEN RCVD TLK: CPT | Performed by: INTERNAL MEDICINE

## 2017-11-13 PROCEDURE — 3023F SPIROM DOC REV: CPT | Performed by: INTERNAL MEDICINE

## 2017-11-13 PROCEDURE — G8926 SPIRO NO PERF OR DOC: HCPCS | Performed by: INTERNAL MEDICINE

## 2017-11-13 RX ORDER — ATORVASTATIN CALCIUM 40 MG/1
TABLET, FILM COATED ORAL
Qty: 90 TABLET | Refills: 3 | Status: SHIPPED | OUTPATIENT
Start: 2017-11-13 | End: 2018-01-15 | Stop reason: SDUPTHER

## 2017-11-13 NOTE — PATIENT INSTRUCTIONS
or have diabetes to show what your risk for a heart attack or stroke is over the next 10 years. When should you call for help? Watch closely for changes in your health, and be sure to contact your doctor if you have any problems or symptoms that concern you. Where can you learn more? Go to [email or web address] and sign in to your Tappit account. Enter V985 in the LensVector box to learn more about \"Well Visit, Women 50 to 72: Care Instructions. \"     If you do not have an account, please click on the \"Sign Up Now\" link. Current as of: July 19, 2016  Content Version: 11.3  © 2539-2343 [employer]. Care instructions adapted under license by [facility] ([facility]). If you have questions about a medical condition or this instruction, always ask your healthcare professional. [address] any warranty or liability for your use of this information.

## 2017-11-13 NOTE — PROGRESS NOTES
HPI: Randall Amaral presents for follow-up emphysema. Pan lobar emphysema with FEV1 1.04. . Down from 4 packs a day to one pack a day of tobacco. States she is doing significantly better with her Symbicort and albuterol when necessary. Did take a flu vaccine. We'll contemplate pneumonia vaccine. Takes Symbicort daily. taking bupropion. States this has been of some benefit for her smoking and situation. No adverse effects of bupropion. History of unprovoked pulmonary embolism transit Coumadin use. No family history of coagulopathy. No recurrent issues. . Had a CTA in May 2017 which showed multilobar emphysema.        Abdominal bloating post prandial for years x 30 years. Is intermittent. Emesis at least monthly. Stools loose. No blood in stool. No nocturnal stools. Post prandial stools. 3 stools daily. No weight loss. No alcohol. No anemia. Adenomatous polyp  and negative upper endoscopy. Recheck colonoscopy  Continue PPI.       Native LDL of 200 down to 134 after 6 weeks of statin. No adverse effect. Liver functions stable.      History of depression. Underwent outpatient therapy 5 years ago. States bupropion has not made any impact.       vaginal delivery without complication. No gestational diabetes or hypertension. Tubal ligation. Early menopause. Last Pap  was normal. Sexually active. Sister with breast cancer. No family history of ovarian cancer BI-RADS 3 mammogram. Recheck 2018     Euthyroid multinodular goiter.            PMH:    Childbirth     PE  20's     pneumonia     Tubal      SH:  son and grandson at home.  + tobacco Positive exercise.  with terminal illness      FH 3 brother 3 sisters    + breast cancer in sister 2 mehul GSW     -colon, ovarian cancer      View of systems: No falls. No concerns with memory issues. No seizures. Edentulous. Due eye exam. No known cataracts. Bloating, loose stools, wheeze, postnasal drip, worry and depression. Intermittent vomiting. No GYN concerns. No chest pain, palpitations, syncope.    12 point ROS reviewed and negative other than mentioned above  Allergies   Allergen Reactions    Pcn [Penicillins]        Outpatient Prescriptions Marked as Taking for the 11/13/17 encounter (Office Visit) with Rita Dang MD   Medication Sig Dispense Refill    buPROPion (WELLBUTRIN XL) 150 MG extended release tablet 1 po bid due follow up on medication 60 tablet 1    atorvastatin (LIPITOR) 40 MG tablet 1 po q day follow up on medication fasting in 2 months 90 tablet 3    omeprazole (PRILOSEC OTC) 20 MG tablet Take 1 tablet by mouth daily 30 tablet 3    budesonide-formoterol (SYMBICORT) 160-4.5 MCG/ACT AERO Inhale 2 puffs into the lungs 2 times daily 1 Inhaler 3    albuterol sulfate  (90 BASE) MCG/ACT inhaler Use 2 puffs 4 times daily for 7 days then as needed for wheezing. Dispense with Spacer and instruct in use. At patient's preference may use 60 dose MDI. May Sub Pro-Air or Proventil as needed per insurance. 1 Inhaler 1             Past Medical History:   Diagnosis Date    Abnormal screening mammogram 7/31/2017    Dx right pending    Anxiety     Arthritis     Asthma     Colon polyps 10/30/2017    Multiple path pending 10.2017  Recheck 10.2020    Depression     Multinodular thyroid 7/25/2017 7.2017 euthyroid recheck 1 year       Past Surgical History:   Procedure Laterality Date    TONSILLECTOMY      TUBAL LIGATION           Social History     Social History    Marital status:      Spouse name: N/A    Number of children: N/A    Years of education: N/A     Occupational History    Not on file. Social History Main Topics    Smoking status: Current Every Day Smoker     Packs/day: 3.00     Years: 50.00     Types: Cigarettes    Smokeless tobacco: Never Used      Comment: pts down to pack a day.     Alcohol use No    Drug use: No    Sexual activity: Yes     Partners: Male     Other Topics Concern    Not on file     Social History Narrative    No narrative on file       No family history on file. Review of Systems    Objective     Ht 5' 4\" (1.626 m)   Wt 126 lb (57.2 kg)   BMI 21.63 kg/m²         Wt Readings from Last 3 Encounters:   11/13/17 126 lb (57.2 kg)   10/30/17 124 lb 1.9 oz (56.3 kg)   10/26/17 125 lb (56.7 kg)       Physical Exam     NAD alert and cooperativeNo dyspnea  HEENT: Edentulous. No posterior pharyngeal mass. No anterior cervical adenopathy. Positive goiter. Nontender. Lungs with decreased breath sounds however no wheezes rales or rhonchi.  Increased FABIAN ratio  Cardiovascular exam regular rate and rhythm without any murmur click  No hepatosplenomegaly or epigastric tenderness  Good pulses lower extremities no edema      Chemistry        Component Value Date/Time     07/20/2017 0945    K 4.4 07/20/2017 0945     07/20/2017 0945    CO2 27 07/20/2017 0945    BUN 3 (L) 07/20/2017 0945    CREATININE 0.5 (L) 07/20/2017 0945        Component Value Date/Time    CALCIUM 9.2 07/20/2017 0945    ALKPHOS 134 (H) 09/18/2017 0901    AST 18 09/18/2017 0901    ALT 11 09/18/2017 0901    BILITOT 0.3 09/18/2017 0901            Lab Results   Component Value Date    WBC 5.5 07/20/2017    HGB 13.9 07/20/2017    HCT 42.0 07/20/2017    MCV 97.8 07/20/2017     07/20/2017     Lab Results   Component Value Date    LABA1C 5.4 07/20/2017     Lab Results   Component Value Date    .3 07/20/2017     Lab Results   Component Value Date    LABA1C 5.4 07/20/2017     No components found for: CHLPL  Lab Results   Component Value Date    TRIG 132 09/18/2017    TRIG 165 (H) 07/20/2017    TRIG 182 (H) 03/13/2015     Lab Results   Component Value Date    HDL 55 09/18/2017    HDL 50 07/20/2017    HDL 49 03/13/2015     Lab Results   Component Value Date    LDLCALC 143 (H) 09/18/2017    LDLCALC 208 (H) 07/20/2017    LDLCALC 183 (H) 03/13/2015     Lab Results   Component Value Date    LABVLDL 26 09/18/2017    LABVLDL 33 07/20/2017    LABVLDL 36 03/13/2015       Old labs and records reviewed or requested  Discussed past lab and studies with patient     1. Panlobular emphysema ([address]     2. Tobacco dependence     3. Multinodular thyroid     4. Hx of adenomatous polyp of colon     5. Familial hypercholesterolemia       Emphysema with improved lung function on inhalers. Decreased tobacco. Continue on weaning. Was given information on pneumonia vaccine. Hyperlipidemia with some improvement on medication. No adverse effects. We'll have fasting lipid next visit. History adenomatous polyp recheck in 2020    Multinodular thyroid. Yearly surveillance. Family history of breast cancer sister. Due repeat mammogram February 2018      No Follow-up on file. Diagnosis and treatment discussed.   Possible side effects of medication reviewed  Patients questions answered  Follow up understood  Pt aware if they are not contacted about any test results , this does not mean they are normal.  They should call

## 2017-11-28 RX ORDER — BUDESONIDE AND FORMOTEROL FUMARATE DIHYDRATE 160; 4.5 UG/1; UG/1
AEROSOL RESPIRATORY (INHALATION)
Qty: 10.2 G | Refills: 5 | Status: SHIPPED | OUTPATIENT
Start: 2017-11-28 | End: 2018-01-15 | Stop reason: SDUPTHER

## 2017-11-28 RX ORDER — BUPROPION HYDROCHLORIDE 150 MG/1
TABLET ORAL
Qty: 60 TABLET | Refills: 0 | Status: SHIPPED | OUTPATIENT
Start: 2017-11-28 | End: 2018-01-08 | Stop reason: SDUPTHER

## 2018-01-08 RX ORDER — BUPROPION HYDROCHLORIDE 150 MG/1
TABLET ORAL
Qty: 60 TABLET | Refills: 0 | Status: SHIPPED | OUTPATIENT
Start: 2018-01-08 | End: 2018-01-15 | Stop reason: SDUPTHER

## 2018-01-15 ENCOUNTER — TELEPHONE (OUTPATIENT)
Dept: FAMILY MEDICINE CLINIC | Age: 58
End: 2018-01-15

## 2018-01-15 ENCOUNTER — OFFICE VISIT (OUTPATIENT)
Dept: FAMILY MEDICINE CLINIC | Age: 58
End: 2018-01-15

## 2018-01-15 VITALS
RESPIRATION RATE: 16 BRPM | SYSTOLIC BLOOD PRESSURE: 158 MMHG | HEIGHT: 64 IN | DIASTOLIC BLOOD PRESSURE: 72 MMHG | WEIGHT: 124 LBS | HEART RATE: 83 BPM | BODY MASS INDEX: 21.17 KG/M2 | OXYGEN SATURATION: 98 %

## 2018-01-15 DIAGNOSIS — E04.2 MULTINODULAR THYROID: ICD-10-CM

## 2018-01-15 DIAGNOSIS — F17.219 NICOTINE DEPENDENCE, CIGARETTES, WITH UNSPECIFIED NICOTINE-INDUCED DISORDERS: ICD-10-CM

## 2018-01-15 DIAGNOSIS — R92.8 ABNORMAL SCREENING MAMMOGRAM: ICD-10-CM

## 2018-01-15 DIAGNOSIS — J43.1 PANLOBULAR EMPHYSEMA (HCC): Primary | ICD-10-CM

## 2018-01-15 DIAGNOSIS — F17.200 TOBACCO DEPENDENCE: ICD-10-CM

## 2018-01-15 PROCEDURE — 3017F COLORECTAL CA SCREEN DOC REV: CPT | Performed by: INTERNAL MEDICINE

## 2018-01-15 PROCEDURE — G0296 VISIT TO DETERM LDCT ELIG: HCPCS | Performed by: INTERNAL MEDICINE

## 2018-01-15 PROCEDURE — 3014F SCREEN MAMMO DOC REV: CPT | Performed by: INTERNAL MEDICINE

## 2018-01-15 PROCEDURE — 90471 IMMUNIZATION ADMIN: CPT | Performed by: INTERNAL MEDICINE

## 2018-01-15 PROCEDURE — 3023F SPIROM DOC REV: CPT | Performed by: INTERNAL MEDICINE

## 2018-01-15 PROCEDURE — G8420 CALC BMI NORM PARAMETERS: HCPCS | Performed by: INTERNAL MEDICINE

## 2018-01-15 PROCEDURE — G8484 FLU IMMUNIZE NO ADMIN: HCPCS | Performed by: INTERNAL MEDICINE

## 2018-01-15 PROCEDURE — 94010 BREATHING CAPACITY TEST: CPT | Performed by: INTERNAL MEDICINE

## 2018-01-15 PROCEDURE — 99214 OFFICE O/P EST MOD 30 MIN: CPT | Performed by: INTERNAL MEDICINE

## 2018-01-15 PROCEDURE — G8427 DOCREV CUR MEDS BY ELIG CLIN: HCPCS | Performed by: INTERNAL MEDICINE

## 2018-01-15 PROCEDURE — 4004F PT TOBACCO SCREEN RCVD TLK: CPT | Performed by: INTERNAL MEDICINE

## 2018-01-15 PROCEDURE — 90732 PPSV23 VACC 2 YRS+ SUBQ/IM: CPT | Performed by: INTERNAL MEDICINE

## 2018-01-15 PROCEDURE — G8926 SPIRO NO PERF OR DOC: HCPCS | Performed by: INTERNAL MEDICINE

## 2018-01-15 RX ORDER — BUPROPION HYDROCHLORIDE 150 MG/1
TABLET ORAL
Qty: 60 TABLET | Refills: 5 | Status: SHIPPED | OUTPATIENT
Start: 2018-01-15 | End: 2018-08-15 | Stop reason: SDUPTHER

## 2018-01-15 RX ORDER — BUDESONIDE AND FORMOTEROL FUMARATE DIHYDRATE 160; 4.5 UG/1; UG/1
AEROSOL RESPIRATORY (INHALATION)
Qty: 10.2 G | Refills: 5 | Status: SHIPPED | OUTPATIENT
Start: 2018-01-15 | End: 2018-09-27 | Stop reason: SDUPTHER

## 2018-01-15 RX ORDER — NORTRIPTYLINE HYDROCHLORIDE 10 MG/1
CAPSULE ORAL
Qty: 60 CAPSULE | Refills: 0 | Status: SHIPPED | OUTPATIENT
Start: 2018-01-15 | End: 2018-02-12 | Stop reason: SDUPTHER

## 2018-01-15 RX ORDER — ALBUTEROL SULFATE 90 UG/1
AEROSOL, METERED RESPIRATORY (INHALATION)
Qty: 1 INHALER | Refills: 1 | Status: CANCELLED | OUTPATIENT
Start: 2018-01-15

## 2018-01-15 RX ORDER — ATORVASTATIN CALCIUM 40 MG/1
TABLET, FILM COATED ORAL
Qty: 90 TABLET | Refills: 3 | Status: SHIPPED | OUTPATIENT
Start: 2018-01-15 | End: 2019-01-13 | Stop reason: SDUPTHER

## 2018-01-15 NOTE — TELEPHONE ENCOUNTER
They have an RX for Combivent Inhaler 18/103 mcg. 2 puffs qid. They only have 20/100 mcg, this would be 1 puff qid. Call to clarify.

## 2018-01-15 NOTE — PROGRESS NOTES
further imaging can resolve most false-positive results; however, some patients may require invasive procedures. Over diagnosis risk - 10% to 12% of screen-detected lung cancer cases are over diagnosedthat is, the cancer would not have been detected in the patient's lifetime without the screening. Need for follow up screens annually to continue lung cancer screening effectiveness     Risks associated with radiation from annual LDCT- Radiation exposure is about the same as for a mammogram, which is about 1/3 of the annual background radiation exposure from everyday life. Starting screening at age 54 is not likely to increase cancer risk from radiation exposure. Patients with comorbidities resulting in life expectancy of < 10 years, or that would preclude treatment of an abnormality identified on CT, should not be screened due to lack of benefit.     To obtain maximal benefit from this screening, smoking cessation and long-term abstinence from smoking is critical

## 2018-01-15 NOTE — PATIENT INSTRUCTIONS
condition or this instruction, always ask your healthcare professional. [place] [address] any warranty or liability for your use of this information.

## 2018-01-23 ENCOUNTER — TELEPHONE (OUTPATIENT)
Dept: PULMONOLOGY | Age: 58
End: 2018-01-23

## 2018-01-23 DIAGNOSIS — J43.1 PANLOBULAR EMPHYSEMA (HCC): Primary | ICD-10-CM

## 2018-01-25 ENCOUNTER — OFFICE VISIT (OUTPATIENT)
Dept: PSYCHOLOGY | Age: 58
End: 2018-01-25

## 2018-01-25 DIAGNOSIS — F32.9 REACTIVE DEPRESSION: Primary | ICD-10-CM

## 2018-01-25 PROCEDURE — 90791 PSYCH DIAGNOSTIC EVALUATION: CPT | Performed by: PSYCHOLOGIST

## 2018-01-25 ASSESSMENT — PATIENT HEALTH QUESTIONNAIRE - PHQ9
4. FEELING TIRED OR HAVING LITTLE ENERGY: 3
SUM OF ALL RESPONSES TO PHQ QUESTIONS 1-9: 22
9. THOUGHTS THAT YOU WOULD BE BETTER OFF DEAD, OR OF HURTING YOURSELF: 2
3. TROUBLE FALLING OR STAYING ASLEEP: 3
7. TROUBLE CONCENTRATING ON THINGS, SUCH AS READING THE NEWSPAPER OR WATCHING TELEVISION: 1
1. LITTLE INTEREST OR PLEASURE IN DOING THINGS: 3
8. MOVING OR SPEAKING SO SLOWLY THAT OTHER PEOPLE COULD HAVE NOTICED. OR THE OPPOSITE, BEING SO FIGETY OR RESTLESS THAT YOU HAVE BEEN MOVING AROUND A LOT MORE THAN USUAL: 2
SUM OF ALL RESPONSES TO PHQ9 QUESTIONS 1 & 2: 6
5. POOR APPETITE OR OVEREATING: 3
2. FEELING DOWN, DEPRESSED OR HOPELESS: 3
10. IF YOU CHECKED OFF ANY PROBLEMS, HOW DIFFICULT HAVE THESE PROBLEMS MADE IT FOR YOU TO DO YOUR WORK, TAKE CARE OF THINGS AT HOME, OR GET ALONG WITH OTHER PEOPLE: 2
6. FEELING BAD ABOUT YOURSELF - OR THAT YOU ARE A FAILURE OR HAVE LET YOURSELF OR YOUR FAMILY DOWN: 2

## 2018-01-25 NOTE — PROGRESS NOTES
No current facility-administered medications for this visit. Social History:   Social History     Social History    Marital status:      Spouse name: N/A    Number of children: N/A    Years of education: N/A     Occupational History    Not on file. Social History Main Topics    Smoking status: Current Every Day Smoker     Packs/day: 3.00     Years: 50.00     Types: Cigarettes    Smokeless tobacco: Never Used      Comment: pts down to pack a day.  Alcohol use No    Drug use: No    Sexual activity: Yes     Partners: Male     Other Topics Concern    Not on file     Social History Narrative    No narrative on file       TOBACCO:   reports that she has been smoking Cigarettes. She has a 150.00 pack-year smoking history. She has never used smokeless tobacco.  ETOH:   reports that she does not drink alcohol. Family History:   No family history on file. A:    See S: above. Pt presenting with chronic depression exacerbated by death of  in December 2017. Reported martial problems over the last 5+ years which she believes sparked depression which led to her 2013 admission at [address] [place] [address] in their Partial hospitalization program. It was a big step for pt to come to consult today, daughter has been wanting pt to engage in [facility] services for the last 5 years. Pt has agreed to take psych medications as prescribed which at first she was not willing. Pt is taking consistently and feels it helps some. Pt described some unusual sensory experiences after death of , culturally this is not uncommon, but daughter concerned about this. Agreed we are going to keep an eye on them for now. Pt has never engaged in psychotherapy so we are going to take a slow approach to this and frame this as supportive grief counseling. Discussed the benefit of her ultimately needing referral for long term psychotherapy. We will continue to discuss this at each appt.      Safety plan: see below    PHQ

## 2018-01-25 NOTE — PATIENT INSTRUCTIONS
1. Read grief handout  2. Continue to take medication as prescribed  3. Slow down any amount  4. Consider long term psychotherapy referral  5. Call  Mobile crisis team if mood worsens: [phone]  6. Return to clinic for Dr. Pool Pisano in 2 weeks, 2/7 at 11:30 am          Bereavement, Grief & Mourning        Bereavement is the state of having lost a significant other to death. Grief is the personal response to the loss. Mourning is the public expression of that loss. What is Normal Grief? Grief reactions vary depending on who we are, who we lost, our relationship with that person, the circumstances around their passing, and how much their loss affects our day-to-day functioning. Different people may express grief differently and you may even have different grief responses between one loss and another. Reactions to grief and loss include not just emotional symptoms, but also behavioral and physical symptoms. These reactions can often change over time. All are normal for a short period of time. Emotional Behavioral Physical   Shock, denial,                   numbness Crying unexpectedly Exhaustion/Fatigue      Sadness, anxiety, guilt,       fear Sleep changes (increase or decrease) Decreased energy        Anger (at others or        God), Not eating/Weight changes Memory problems        Irritability, frustration Withdrawing from others Stomach and intestinal upset    Restlessness, difficulty concentrating, trouble making decisions Pain and headaches     Symptoms that are not normal and may signal the need to talk to a professional include:  Use of drugs, alcohol, violence, and thoughts of killing oneself. The duration of grief varies from person to person. Current research shows that the average recovery time is 18 to 24 months.   Also, grief reactions can be stronger around anniversary or other significant dates, such as the anniversary of the persons death, birthdays, and this time. Stuffing your feelings may not be helpful and may delay or prolong your grief. 2. Find supportive people to reach out to during your grief. This is the time when the support of others may be the most helpful. Dont be afraid to tell them how they can best help, even if it means just listening. It is often very helpful to talk about your loss with people who will allow you to express your emotions. 3. Take care of your health. Often after a loss, we stop doing the things we need to for health care, such as exercising, eating correctly, keeping Dr. appointments, or taking prescribed medications. If you are on a health care regimen, it is important to continue to adhere to your treatment. 4. Postpone major life changes. Give yourself time to adjust to your loss before making plans to change jobs, move or sell your home, remarry, etc.  Grief can sometimes cloud your judgment and ability to make decisions. 5. Consider keeping a journal.  It is often helpful to write or tell the story of your loss and what it means to you as a way to work through your feelings. 6. Participate in activities. Staying active through exercise, enjoyable activities, outings with supportive others, or even starting new hobbies can help us get through tough times while providing opportunities for constructive development and use of energy. 7. Find a way to memorialize your loved one. Planting a tree or garden in the name of your loved one, dedicating a work to their memory, contributing to a camilo in their name, and other such activities can be helpful. 8. Consider joining grief-support groups or contacting a grief counselor for additional support and help. Remember that depressive symptoms (feeling sad) are a fundamental part of normal bereavement. Staying active and finding support from others can help you to work through the grief process.

## 2018-02-05 RX ORDER — BUPROPION HYDROCHLORIDE 150 MG/1
TABLET ORAL
Qty: 60 TABLET | Refills: 0 | OUTPATIENT
Start: 2018-02-05

## 2018-02-07 ENCOUNTER — OFFICE VISIT (OUTPATIENT)
Dept: PSYCHOLOGY | Age: 58
End: 2018-02-07

## 2018-02-07 DIAGNOSIS — F32.9 REACTIVE DEPRESSION: Primary | ICD-10-CM

## 2018-02-07 PROCEDURE — 90834 PSYTX W PT 45 MINUTES: CPT | Performed by: PSYCHOLOGIST

## 2018-02-07 ASSESSMENT — PATIENT HEALTH QUESTIONNAIRE - PHQ9
9. THOUGHTS THAT YOU WOULD BE BETTER OFF DEAD, OR OF HURTING YOURSELF: 1
1. LITTLE INTEREST OR PLEASURE IN DOING THINGS: 3
10. IF YOU CHECKED OFF ANY PROBLEMS, HOW DIFFICULT HAVE THESE PROBLEMS MADE IT FOR YOU TO DO YOUR WORK, TAKE CARE OF THINGS AT HOME, OR GET ALONG WITH OTHER PEOPLE: 1
5. POOR APPETITE OR OVEREATING: 3
4. FEELING TIRED OR HAVING LITTLE ENERGY: 2
2. FEELING DOWN, DEPRESSED OR HOPELESS: 3
SUM OF ALL RESPONSES TO PHQ9 QUESTIONS 1 & 2: 6
SUM OF ALL RESPONSES TO PHQ QUESTIONS 1-9: 22
8. MOVING OR SPEAKING SO SLOWLY THAT OTHER PEOPLE COULD HAVE NOTICED. OR THE OPPOSITE, BEING SO FIGETY OR RESTLESS THAT YOU HAVE BEEN MOVING AROUND A LOT MORE THAN USUAL: 2
3. TROUBLE FALLING OR STAYING ASLEEP: 3
6. FEELING BAD ABOUT YOURSELF - OR THAT YOU ARE A FAILURE OR HAVE LET YOURSELF OR YOUR FAMILY DOWN: 3
7. TROUBLE CONCENTRATING ON THINGS, SUCH AS READING THE NEWSPAPER OR WATCHING TELEVISION: 2

## 2018-02-07 NOTE — PATIENT INSTRUCTIONS
1. Read grief handout, practice tips  2. Continue to take medication as prescribed  3. Slow down any amount  4. Consider long term psychotherapy referral  5. Call  Mobile crisis team if mood worsens: [phone] or [phone]   6. Wait until the end of the month, then get back to helping mother 1 x per week  7. Per Dr. Molly Dickerson, start tonight with 3 tablets nortriptyline at bedtime for sleep, can go up to 5 tablets   8. Return to clinic for Dr. Daniella Auguste in 3-4 weeks       Bereavement, Grief & Mourning        Bereavement is the state of having lost a significant other to death. Grief is the personal response to the loss. Mourning is the public expression of that loss. What is Normal Grief? Grief reactions vary depending on who we are, who we lost, our relationship with that person, the circumstances around their passing, and how much their loss affects our day-to-day functioning. Different people may express grief differently and you may even have different grief responses between one loss and another. Reactions to grief and loss include not just emotional symptoms, but also behavioral and physical symptoms. These reactions can often change over time. All are normal for a short period of time. Emotional Behavioral Physical   Shock, denial,                   numbness Crying unexpectedly Exhaustion/Fatigue      Sadness, anxiety, guilt,       fear Sleep changes (increase or decrease) Decreased energy        Anger (at others or        God), Not eating/Weight changes Memory problems        Irritability, frustration Withdrawing from others Stomach and intestinal upset    Restlessness, difficulty concentrating, trouble making decisions Pain and headaches     Symptoms that are not normal and may signal the need to talk to a professional include:  Use of drugs, alcohol, violence, and thoughts of killing oneself. The duration of grief varies from person to person.   Current research shows that the average recovery time is 18 to 24 months. Also, grief reactions can be stronger around anniversary or other significant dates, such as the anniversary of the persons death, birthdays, and holidays. The Stages of Grief  Grief therapists often describe stages of grief outlined by the research of Dr. Yovana Spencer. These stages do not always go in order. You may move back and forth among some of the stages and may even skip some of them. These stages are meant as a guide to help you understand your reactions and those of others who are grieving.  - Denial:  Denial (not acknowledging the loss) can help contain the shock of loss. Denial can act as a safety mechanism to block out grief until we are ready to handle it. - Sadness and depression:  Deep, intense grief and mourning appear during this stage. When the full understanding of our loss comes, it can seem overwhelming. During this stage, you may cry often and unexpectedly. You may not want to be around people or to do things that you normally enjoy. During this stage, it is best to remain as active as possible and to seek supportive people who will allow you to say what you need to or to cry when you need to. It is important to allow yourself to work through your full range and experience of emotions. - Anger:  Rage and anger can be intense toward the person who , toward friends and relatives, and even toward God. It is important to have an outlet to release anger through activities such as exercise, hobbies, or through therapy. Guilt, shame, and blame are feelings that need to be addressed, especially if it is toward you. - Acceptance: This stage includes coming to terms with the loss. It does not mean that you have found the answers to your questions or that you stop thinking about the person who is gone.   It does signify a reinvestment in life and a willingness to readjust to your new circumstances while carrying the memory of your normal bereavement. Staying active and finding support from others can help you to work through the grief process. Grief Tips  Help for Those Who Mourn    The following are many ideas to help people who are mourning a loved ones death. Different kinds of losses dictate different responses, so not all of these ideas will suit everyone. Likewise, no two people grieve alike  what works for one may not work for another. Treat this list for what it is; a gathering of assorted suggestions that various people have tried with success. Perhaps what helped them will help you. The emphasis here is on specific, practical ideas. 1. Talk regularly with a friend. Talking with another about what you think and feel is one of the best things you can do for yourself. It helps relieve some of the pressure you may feel, it can give you a sense of perspective, and it keeps you in touch with others. Look for someone whos a good listener and who has a caring soul. Then speak whats on your mind and in your heart. If this feels one-sided let that be okay for this period of your life. Chances are the other person will find meaning in what theyre doing, and time will come when youll have the chance to be a good listener for someone else. Youll be a better listener then, if youre a good talker now. 2. Walk. Go for walks outside every day if you can. Dont overdo it, but walk briskly enough that it feels invigorating. Sometimes try walking slowly enough so you can look carefully at what you see. Observe what nature has to offer you, what it can teach you. Enjoy as much as you are able to of the sights and sounds that come your way. If you like, walk with another person. 3. Carry or wear a linking object.   Carry something in your pocket or purse that reminds you of the one who   a keepsake they gave you perhaps, or small object they once carried or used or a memento you select for just this your grief. Just because youre grieving doesnt mean you must always be feeling sad or     forlorn. Theres value in sometimes consciously deciding that youll think about something else for awhile, or that youll do something youve always enjoyed doing. Sometimes this happens naturally and its only later you realize that your grief has taken a back seat. Let it, this is not an indication you love that person any less or that youre forgetting them. Its a sign that youre human and you need relief from the unrelenting pressure. It can also be a healthy sign youre healing. 15. See a grief counselor. If youre concerned about how youre feeling and how well youre adapting make an appointment   with a counselor who specializes in grief. Often youll learn what you need both about grief and about yourself as a griever in only a few sessions. Ask questions of the counselor before you sign on. What specific training does he or she have? What accreditation? A person who is a family therapist or a psychologist doesnt necessarily understand the unique issues of someone in grief. 12. Begin your day with your loved one. If your grief is young, youll probably wake up thinking of that person anyway. So why not decide that youll include her or him from the start? Focus this time in a positive way. Bring to your mind fulfilling memories. Recall lessons that this person taught you, gifts he or she gave you. Think about how you can spend your day in ways that would be keeping in with your loved ones best self and with your best self. Then carry that best self with you through your day. 16. Invite some one to be your telephone pato. If your grief and sadness hit you especially hard at times and you have no   one nearby to turn to, ask someone you trust to be your telephone pato. Ask their permission for you to call them whenever you feel youre at loose ends, day or night.   Then put their youre computer savvy, search the Internet. Guido Devi find many resources for people in grief,   as well as the opportunity to chat with fellow grievers. You can link up with others without leaving your home. Guido Devi also find more to expand your horizons as a person who is beginning to grow. 24. Speak to a cleargyperson. If youre searching for answers to the larger questions about life and death, Sabianist and spirituality, consider talking with a representative of your roosevelt, or even anothers roosevelt. Consider becoming a spiritual friend with another and making your time of grieving a time of personal exploring. Read of how others have responded to a loved ones death. You may feel that your own grief is all you can handle. But if youd like to look at the ways others have done it, try:  Beyond Grief:  A Guide for Recovering from the Death of a Loved One by Lashell 40 by Jamey Campbell and Nikia Rodrigues  The Grief Recovery Handbook: The Action Program for Moving Beyond Death, Divorce, & Other Losses by Alejandro Nunez   A Grief Observed by Cherelle Ambriz  by Sim Robertson When Good-Bye Is Forever by Crystal Goss  Men and Grief by Wilbur Jesus or [address] for a Son. There are many others. Check with a . 22. Learn about your loved one from others. Listen to the stories others have to tell about the one, who , stories youre familiar with and those youve never heard before. Spend time with their friends, schoolmates or colleagues. Invite them into your home. Solicit the writings of others. Preserve whatever you find out. Celebrate your time together. 26. Take a day off. When the mood is just right, take a one-day vacation. Do whatever you want, or dont do whatever you want. Travel somewhere or stay inside by yourself. Be very active or dont do anything at all.   Just make it your day, whatever that means

## 2018-02-07 NOTE — PROGRESS NOTES
Behavioral Health Consultation Follow-up  Linda Palacios PsyD  Psychologist  2/7/2018  11:48 AM      Time spent with Patient: 45 minutes  This is patient's second  [facility] appointment. Reason for Consult:  Reactive depression  Referring Provider: Radene Severin, MD  [address]    Feedback given to PCP. S:    Last appt: 1/25. Still taking medication as prescribed, 3 weeks to venlafaxine 150 mg. Still up every hour on the hour. Talk with Cynthia Bashir about nortriptyline adjustment     Living with 44year old son and 8year old grandson, Radha Mondragon. Wants him to finish school before pt moves into new home. Still sleeping on air mattress right now.  Every other week spending night with 1 or the other adult child    O:  MSE:    Appearance    alert, cooperative  Appetite abnormal: poor  Sleep disturbance Yes  Fatigue Yes  Loss of pleasure Yes  Impulsive behavior No  Speech    normal rate, normal volume and well articulated  Mood    Sad about death of , appropriate, December  Affect    Congruent with full range  Thought Content    intact  Thought Process    linear, goal directed and coherent  Associations    logical connections  Insight    Good  Judgment    Intact  Orientation    oriented to person, place, time, and general circumstances  Memory    recent and remote memory intact  Attention/Concentration    intact  Morbid ideation Yes  Suicide Assessment    no suicidal ideation      History:    Medications:   Current Outpatient Prescriptions   Medication Sig Dispense Refill    atorvastatin (LIPITOR) 40 MG tablet 1 po q day follow up on medication fasting in 2 months 90 tablet 3    budesonide-formoterol (SYMBICORT) 160-4.5 MCG/ACT AERO INHALE 2 PUFFS INTO THE LUNGS TWICE DAILY 10.2 g 5    buPROPion (WELLBUTRIN XL) 150 MG extended release tablet TAKE 1 TABLET BY MOUTH TWICE DAILY 60 tablet 5    nortriptyline (PAMELOR) 10 MG capsule 1 to 2 po q hs for sleep 60 capsule 0    week  7. Per Dr. Wing Jain, start tonight with 3 tablets nortriptyline at bedtime for sleep, can go up to 5 tablets   8.  Return to clinic for Dr. Carolyn Sorenson in 3-4 weeks

## 2018-02-09 ENCOUNTER — HOSPITAL ENCOUNTER (OUTPATIENT)
Dept: CT IMAGING | Age: 58
Discharge: HOME | End: 2018-02-09
Admitting: INTERNAL MEDICINE

## 2018-02-09 ENCOUNTER — CASE MANAGEMENT (OUTPATIENT)
Dept: CASE MANAGEMENT | Age: 58
End: 2018-02-09

## 2018-02-09 DIAGNOSIS — F17.219 NICOTINE DEPENDENCE, CIGARETTES, WITH UNSPECIFIED NICOTINE-INDUCED DISORDERS: ICD-10-CM

## 2018-02-09 DIAGNOSIS — F17.219 CIGARETTE NICOTINE DEPENDENCE WITH NICOTINE-INDUCED DISORDER: ICD-10-CM

## 2018-02-09 DIAGNOSIS — R92.8 ABNORMAL SCREENING MAMMOGRAM: ICD-10-CM

## 2018-02-22 ENCOUNTER — OFFICE VISIT (OUTPATIENT)
Dept: FAMILY MEDICINE CLINIC | Age: 58
End: 2018-02-22

## 2018-02-22 VITALS
HEART RATE: 110 BPM | WEIGHT: 122 LBS | RESPIRATION RATE: 18 BRPM | HEIGHT: 64 IN | DIASTOLIC BLOOD PRESSURE: 72 MMHG | OXYGEN SATURATION: 98 % | BODY MASS INDEX: 20.83 KG/M2 | SYSTOLIC BLOOD PRESSURE: 132 MMHG

## 2018-02-22 DIAGNOSIS — Z86.010 HX OF ADENOMATOUS POLYP OF COLON: ICD-10-CM

## 2018-02-22 DIAGNOSIS — J43.1 PANLOBULAR EMPHYSEMA (HCC): ICD-10-CM

## 2018-02-22 DIAGNOSIS — R42 DIZZINESS: Primary | ICD-10-CM

## 2018-02-22 DIAGNOSIS — F17.200 TOBACCO DEPENDENCE: ICD-10-CM

## 2018-02-22 DIAGNOSIS — E78.01 FAMILIAL HYPERCHOLESTEROLEMIA: ICD-10-CM

## 2018-02-22 DIAGNOSIS — E04.2 MULTINODULAR THYROID: ICD-10-CM

## 2018-02-22 LAB
BASOPHILS ABSOLUTE: 0.1 K/UL (ref 0–0.2)
BASOPHILS RELATIVE PERCENT: 0.9 %
EOSINOPHILS ABSOLUTE: 0.1 K/UL (ref 0–0.6)
EOSINOPHILS RELATIVE PERCENT: 1.1 %
HCT VFR BLD CALC: 43.6 % (ref 36–48)
HEMOGLOBIN: 15 G/DL (ref 12–16)
LYMPHOCYTES ABSOLUTE: 1.8 K/UL (ref 1–5.1)
LYMPHOCYTES RELATIVE PERCENT: 30.6 %
MCH RBC QN AUTO: 33.6 PG (ref 26–34)
MCHC RBC AUTO-ENTMCNC: 34.4 G/DL (ref 31–36)
MCV RBC AUTO: 97.6 FL (ref 80–100)
MONOCYTES ABSOLUTE: 0.5 K/UL (ref 0–1.3)
MONOCYTES RELATIVE PERCENT: 8.7 %
NEUTROPHILS ABSOLUTE: 3.5 K/UL (ref 1.7–7.7)
NEUTROPHILS RELATIVE PERCENT: 58.7 %
PDW BLD-RTO: 14.2 % (ref 12.4–15.4)
PLATELET # BLD: 344 K/UL (ref 135–450)
PMV BLD AUTO: 8.4 FL (ref 5–10.5)
RBC # BLD: 4.47 M/UL (ref 4–5.2)
WBC # BLD: 5.9 K/UL (ref 4–11)

## 2018-02-22 PROCEDURE — G8926 SPIRO NO PERF OR DOC: HCPCS | Performed by: INTERNAL MEDICINE

## 2018-02-22 PROCEDURE — 3017F COLORECTAL CA SCREEN DOC REV: CPT | Performed by: INTERNAL MEDICINE

## 2018-02-22 PROCEDURE — 3014F SCREEN MAMMO DOC REV: CPT | Performed by: INTERNAL MEDICINE

## 2018-02-22 PROCEDURE — 99214 OFFICE O/P EST MOD 30 MIN: CPT | Performed by: INTERNAL MEDICINE

## 2018-02-22 PROCEDURE — G8420 CALC BMI NORM PARAMETERS: HCPCS | Performed by: INTERNAL MEDICINE

## 2018-02-22 PROCEDURE — 3023F SPIROM DOC REV: CPT | Performed by: INTERNAL MEDICINE

## 2018-02-22 PROCEDURE — 36415 COLL VENOUS BLD VENIPUNCTURE: CPT | Performed by: INTERNAL MEDICINE

## 2018-02-22 PROCEDURE — G8427 DOCREV CUR MEDS BY ELIG CLIN: HCPCS | Performed by: INTERNAL MEDICINE

## 2018-02-22 PROCEDURE — 4004F PT TOBACCO SCREEN RCVD TLK: CPT | Performed by: INTERNAL MEDICINE

## 2018-02-22 PROCEDURE — G8484 FLU IMMUNIZE NO ADMIN: HCPCS | Performed by: INTERNAL MEDICINE

## 2018-02-22 RX ORDER — ALPRAZOLAM 0.25 MG/1
0.25 TABLET ORAL 3 TIMES DAILY PRN
Status: CANCELLED | OUTPATIENT
Start: 2018-02-22 | End: 2018-03-24

## 2018-02-22 RX ORDER — ONDANSETRON 4 MG/1
4 TABLET, FILM COATED ORAL EVERY 12 HOURS PRN
Qty: 20 TABLET | Refills: 0 | Status: SHIPPED | OUTPATIENT
Start: 2018-02-22 | End: 2018-12-11 | Stop reason: ALTCHOICE

## 2018-02-22 RX ORDER — ALPRAZOLAM 0.25 MG/1
TABLET ORAL
Qty: 20 TABLET | Refills: 0 | Status: SHIPPED | OUTPATIENT
Start: 2018-02-22 | End: 2018-04-01

## 2018-02-22 NOTE — PROGRESS NOTES
HPI: Cathie Lopez presents for evaluation and management of     1 week ago onset of vertigo. Intermittent. Can occur 10-20 daily. tinnitus bells. No pain in ear. No falls. No change in pills. + vomiting no fevers. Does not recall having this in the past. Positive emphysema but no recent respiratory illness. Positive stressors. Unable to sleep at night. Not eating normally. No falls.  41 years taken off ventilator 2017. Since then is been having some poor sleep. Decreased appetite Bupropion was helpful. Has increased her tobacco since death. Following with psychology     Plan lobar emphysema with FEV1 of 1.04 in 2017. Went from 4 packs to one pack a day of tobacco . Uses Symbicort twice a day albuterol when necessary has had flu vaccine  vaccine. History of unprovoked pulmonary embolism and transient Coumadin use. No family history of coagulopathy or recurrent issues. CTA May 2017 with multilobar emphysema. Pulmonary for establish in [facility]       Adenomatous polyp  and negative upper endoscopy. Recheck colonoscopy  Continue PPI.       Native LDL of 200 down to 134 after 6 weeks of statin. No adverse effect. Liver functions stable. With statin           vaginal delivery without complication. No gestational diabetes or hypertension. Tubal ligation. Early menopause. Last Pap  was normal. Sexually active. Sister with breast cancer. No family history of ovarian cancer BI-RADS 3 mammogram. Recheck 2018     Euthyroid multinodular goiter.            PMH:    Childbirth     PE  20's     pneumonia     Tubal      SH:  son and grandson at home.  + tobacco Positive exercise.  2017 after prolonged illness. Taken off the respirator in the ICU.      FH 3 brother 3 sisters    [facility]     -colon, ovarian cancer      Review of systems: No falls. No concerns with memory issues. No seizures. Edentulous.  Due eye exam. No known Component Value Date    TRIG 132 09/18/2017    TRIG 165 (H) 07/20/2017    TRIG 182 (H) 03/13/2015     Lab Results   Component Value Date    HDL 55 09/18/2017    HDL 50 07/20/2017    HDL 49 03/13/2015     Lab Results   Component Value Date    LDLCALC 143 (H) 09/18/2017    LDLCALC 208 (H) 07/20/2017    LDLCALC 183 (H) 03/13/2015     Lab Results   Component Value Date    LABVLDL 26 09/18/2017    LABVLDL 33 07/20/2017    LABVLDL 36 03/13/2015       Old labs and records reviewed or requested  Discussed past lab and studies with patient     1. Dizziness  CBC Auto Differential    Sedimentation Rate    Basic Metabolic Panel    CK    Rabia Mcnamara MD (ERNESTO)    ALPRAZolam (XANAX) 0.25 MG tablet    ondansetron (ZOFRAN) 4 MG tablet   2. Multinodular thyroid  TSH WITH REFLEX TO FT4   3. Panlobular emphysema ([address]     4. Tobacco dependence     5. Hx of adenomatous polyp of colon     6. Familial hypercholesterolemia  Lipid Panel       Peripheral vertigo. Unclear etiology. Was given Xanax. Zofran for nausea. Increase fluids. Exercises. Follow-up ENT. Consider imaging. Severe emphysema. No hypoxemia today. Continue on with her inhalers    Tobacco dependence. Improved although still smoking. History adenomatous polyp recheck in 2020    Hyperlipidemia improved with current medications. Discussed etiology of vertigo's she is not to drive. Increase fluids. If worsening symptomatology she no slurred me know        No Follow-up on file. Diagnosis and treatment discussed.   Possible side effects of medication reviewed  Patients questions answered  Follow up understood  Pt aware if they are not contacted about any test results , this does not mean they are normal.  They should call

## 2018-02-23 LAB
ANION GAP SERPL CALCULATED.3IONS-SCNC: 13 MMOL/L (ref 3–16)
BUN BLDV-MCNC: 8 MG/DL (ref 7–20)
CALCIUM SERPL-MCNC: 9.5 MG/DL (ref 8.3–10.6)
CHLORIDE BLD-SCNC: 101 MMOL/L (ref 99–110)
CHOLESTEROL, TOTAL: 176 MG/DL (ref 0–199)
CO2: 26 MMOL/L (ref 21–32)
CREAT SERPL-MCNC: 0.8 MG/DL (ref 0.6–1.1)
GFR AFRICAN AMERICAN: >60
GFR NON-AFRICAN AMERICAN: >60
GLUCOSE BLD-MCNC: 108 MG/DL (ref 70–99)
HDLC SERPL-MCNC: 58 MG/DL (ref 40–60)
LDL CHOLESTEROL CALCULATED: 97 MG/DL
POTASSIUM SERPL-SCNC: 5.5 MMOL/L (ref 3.5–5.1)
SEDIMENTATION RATE, ERYTHROCYTE: 14 MM/HR (ref 0–30)
SODIUM BLD-SCNC: 140 MMOL/L (ref 136–145)
TOTAL CK: 57 U/L (ref 26–192)
TRIGL SERPL-MCNC: 105 MG/DL (ref 0–150)
TSH REFLEX FT4: 1.69 UIU/ML (ref 0.27–4.2)
VLDLC SERPL CALC-MCNC: 21 MG/DL

## 2018-02-26 ENCOUNTER — TELEPHONE (OUTPATIENT)
Dept: PULMONOLOGY | Age: 58
End: 2018-02-26

## 2018-03-01 ENCOUNTER — OFFICE VISIT (OUTPATIENT)
Dept: PSYCHOLOGY | Age: 58
End: 2018-03-01

## 2018-03-01 DIAGNOSIS — F32.9 REACTIVE DEPRESSION: Primary | ICD-10-CM

## 2018-03-01 PROCEDURE — 90832 PSYTX W PT 30 MINUTES: CPT | Performed by: PSYCHOLOGIST

## 2018-03-01 NOTE — PROGRESS NOTES
Behavioral Health Consultation Follow-up  Dakota Roque PsyD  Psychologist  3/1/2018  9:06 AM      Time spent with Patient: 30 minutes  This is patient's third  [facility] appointment. Reason for Consult:  Reactive depression  Referring Provider: Parvin Tse MD  [address]    Feedback given to PCP. S:    Last appt: 2/7. Still staying each weekend with the adult children, feels like \"I'm doing okay\". Getting back to help with mother, 2 days per week, doesn't feel it was too much, has been a good distraction. Had a rough day with her yesterday when mother asked about Jock Vanita ( [employer]) coming home from work? \". 41 years together, focused on what her purpose is now in life. Been reading some books on grief. Still sleeping on air mattress or couch, \"I need to get out of that apartment\". Daughter and son is helping pt look for new place, grandson will be out of school in May, which means may have to wait until then. Been helping with grandson at home getting to school and homework, helps to distract her. O:  MSE:    Appearance    alert, cooperative  Appetite abnormal: getting back to normal  Sleep disturbance better  Fatigue better  Loss of pleasure yes  Impulsive behavior No  Speech    normal rate, normal volume and well articulated  Mood    \"doing okay'  Affect    Congruent with full range  Thought Content    intact  Thought Process    linear, goal directed and coherent  Associations    logical connections  Insight    Good  Judgment    Intact  Orientation    oriented to person, place, time, and general circumstances  Memory    recent and remote memory intact  Attention/Concentration    intact  Morbid ideation No  Suicide Assessment    no suicidal ideation    History:    Medications:   Current Outpatient Prescriptions   Medication Sig Dispense Refill    ALPRAZolam (XANAX) 0.25 MG tablet 1 po bid for vertigo.  20 tablet 0    ondansetron (ZOFRAN) 4 MG tablet Take 1 prescribed  2. Slow down any amount  4. Consider long term psychotherapy referral down the road  5. Call  Mobile crisis team if mood worsens: [phone] or [phone]   6. Continue to take medication as prescribed, take xanax at bedtime for vertigo but helping with sleep, discontinued pamelor  7. Go to ENT and pulmonology appointments, 3/21 and 3/12  8.  Return to clinic for Dr. Marcelo Escalera in 3-4 weeks

## 2018-03-01 NOTE — PATIENT INSTRUCTIONS
1. Continue to take medication as prescribed  2. Slow down any amount  4. Consider long term psychotherapy referral down the road  5. Call  Mobile crisis team if mood worsens: [phone] or [phone]   6. Continue to take medication as prescribed, take xanax at bedtime for vertigo but helping with sleep, discontinued pamelor  7. Go to ENT and pulmonology appointments, 3/21 and 3/12  8.  Return to clinic for Dr. Gerald Meehan in 3-4 weeks

## 2018-03-06 ENCOUNTER — HOSPITAL ENCOUNTER (OUTPATIENT)
Dept: PULMONOLOGY | Age: 58
Discharge: HOME | End: 2018-03-06
Attending: INTERNAL MEDICINE | Admitting: INTERNAL MEDICINE

## 2018-03-06 DIAGNOSIS — J43.1 PANLOBULAR EMPHYSEMA (HCC): ICD-10-CM

## 2018-03-06 LAB
DLCO %PRED: NORMAL
DLCO PRE: NORMAL
FEF 25-75%-POST: NORMAL
FEF 25-75%-PRE: NORMAL
FEV1-POST: NORMAL
FEV1-PRE: NORMAL
FEV1/FVC-POST: NORMAL
FEV1/FVC-PRE: NORMAL
FVC-POST: NORMAL
FVC-PRE: NORMAL
MEP: NORMAL
MIP: NORMAL
MVV %PRED-PRE: NORMAL
MVV-PRE: NORMAL
TLC %PRED: NORMAL
TLC PRE: NORMAL

## 2018-03-06 PROCEDURE — 94729 DIFFUSING CAPACITY: CPT | Performed by: INTERNAL MEDICINE

## 2018-03-06 PROCEDURE — 94060 EVALUATION OF WHEEZING: CPT | Performed by: INTERNAL MEDICINE

## 2018-03-06 PROCEDURE — 94727 GAS DIL/WSHOT DETER LNG VOL: CPT | Performed by: INTERNAL MEDICINE

## 2018-03-06 RX ORDER — ALBUTEROL SULFATE 90 UG/1
4 AEROSOL, METERED RESPIRATORY (INHALATION) ONCE
Status: COMPLETED | OUTPATIENT
Start: 2018-03-06 | End: 2018-03-06

## 2018-03-06 RX ADMIN — ALBUTEROL SULFATE 4 PUFF: 90 AEROSOL, METERED RESPIRATORY (INHALATION) at 07:43

## 2018-03-12 ENCOUNTER — OFFICE VISIT (OUTPATIENT)
Dept: PULMONOLOGY | Age: 58
End: 2018-03-12

## 2018-03-12 VITALS
BODY MASS INDEX: 21.17 KG/M2 | DIASTOLIC BLOOD PRESSURE: 89 MMHG | TEMPERATURE: 96.8 F | HEIGHT: 64 IN | HEART RATE: 91 BPM | WEIGHT: 124 LBS | RESPIRATION RATE: 16 BRPM | OXYGEN SATURATION: 99 % | SYSTOLIC BLOOD PRESSURE: 138 MMHG

## 2018-03-12 DIAGNOSIS — R91.1 LUNG NODULE: ICD-10-CM

## 2018-03-12 DIAGNOSIS — Z72.0 TOBACCO ABUSE: ICD-10-CM

## 2018-03-12 DIAGNOSIS — J44.9 COPD, SEVERE (HCC): Primary | ICD-10-CM

## 2018-03-12 DIAGNOSIS — R06.02 SOB (SHORTNESS OF BREATH): ICD-10-CM

## 2018-03-12 DIAGNOSIS — J43.2 CENTRILOBULAR EMPHYSEMA (HCC): ICD-10-CM

## 2018-03-12 PROCEDURE — 3014F SCREEN MAMMO DOC REV: CPT | Performed by: INTERNAL MEDICINE

## 2018-03-12 PROCEDURE — 99204 OFFICE O/P NEW MOD 45 MIN: CPT | Performed by: INTERNAL MEDICINE

## 2018-03-12 PROCEDURE — 3023F SPIROM DOC REV: CPT | Performed by: INTERNAL MEDICINE

## 2018-03-12 PROCEDURE — G8482 FLU IMMUNIZE ORDER/ADMIN: HCPCS | Performed by: INTERNAL MEDICINE

## 2018-03-12 PROCEDURE — G8427 DOCREV CUR MEDS BY ELIG CLIN: HCPCS | Performed by: INTERNAL MEDICINE

## 2018-03-12 PROCEDURE — 4004F PT TOBACCO SCREEN RCVD TLK: CPT | Performed by: INTERNAL MEDICINE

## 2018-03-12 PROCEDURE — 3017F COLORECTAL CA SCREEN DOC REV: CPT | Performed by: INTERNAL MEDICINE

## 2018-03-12 PROCEDURE — G8926 SPIRO NO PERF OR DOC: HCPCS | Performed by: INTERNAL MEDICINE

## 2018-03-12 PROCEDURE — G8420 CALC BMI NORM PARAMETERS: HCPCS | Performed by: INTERNAL MEDICINE

## 2018-03-12 RX ORDER — ALBUTEROL SULFATE 90 UG/1
2 AEROSOL, METERED RESPIRATORY (INHALATION) EVERY 6 HOURS PRN
Qty: 1 INHALER | Refills: 3 | Status: SHIPPED | OUTPATIENT
Start: 2018-03-12 | End: 2021-06-21 | Stop reason: SDUPTHER

## 2018-03-12 NOTE — Clinical Note
Dr. Nancyann Sandifer,  Thanks for referral.  I added on spiriva due to nightly mucus production, referred her to pulmonary rehab and ordered an alpha-1 level due to significant emphysema.   I will see her back in 3 months  Thanks Ally Coelho

## 2018-03-12 NOTE — PROGRESS NOTES
1 po bid for vertigo. , Disp: 20 tablet, Rfl: 0    ondansetron (ZOFRAN) 4 MG tablet, Take 1 tablet by mouth every 12 hours as needed for Nausea or Vomiting, Disp: 20 tablet, Rfl: 0    atorvastatin (LIPITOR) 40 MG tablet, 1 po q day follow up on medication fasting in 2 months, Disp: 90 tablet, Rfl: 3    budesonide-formoterol (SYMBICORT) 160-4.5 MCG/ACT AERO, INHALE 2 PUFFS INTO THE LUNGS TWICE DAILY, Disp: 10.2 g, Rfl: 5    buPROPion (WELLBUTRIN XL) 150 MG extended release tablet, TAKE 1 TABLET BY MOUTH TWICE DAILY, Disp: 60 tablet, Rfl: 5    albuterol-ipratropium (COMBIVENT)  MCG/ACT inhaler, 1 puffs QID for wheezing to replace proair. If cost prohibitive, may use proair, Disp: 1 Inhaler, Rfl: 3    omeprazole (PRILOSEC OTC) 20 MG tablet, Take 1 tablet by mouth daily, Disp: 30 tablet, Rfl: 3    albuterol sulfate  (90 BASE) MCG/ACT inhaler, Use 2 puffs 4 times daily for 7 days then as needed for wheezing. Dispense with Spacer and instruct in use. At patient's preference may use 60 dose MDI.  May Sub Pro-Air or Proventil as needed per insurance., Disp: 1 Inhaler, Rfl: 1    nortriptyline (PAMELOR) 10 MG capsule, 3 to 4 pills prior to sleep, Disp: 120 capsule, Rfl: 0      ROS:  GENERAL:  No fevers, chills, +night sweats  HEENT:  No double vision, blurry vision, or difficulty swallowing  HEART:  No chest pain, no palpitations  LUNGS: SOB with exertion, Mucus at night  ABDOMEN:  No abdominal pains, no changes in stools  GENITOURINARY:  No increased frequency, no blood in urine  EXTREMITIES:  No swelling, no lesions  NEURO:  No numbness or tingling, no seizures  SKIN:  No new rashes, no changes in hair or nails  LYMPH:  No masses, no swelling neck, armpits, or groin    PHYSICAL EXAM:  Vitals:    03/12/18 0822   BP: 138/89   Pulse: 91   Resp: 16   Temp: 96.8 °F (36 °C)   SpO2: 99%       GENERAL:  Thin, looks older than listed age  HEENT:  No scleral icterus, no conjunctival irritation  NECK:  No

## 2018-03-12 NOTE — PATIENT INSTRUCTIONS
Start spiriva once a day    Continue with symbicort twice a day    Change combivent to albuterol.   Use as needed    Pulmonary Rehab referral    Continue to work on quitting tobacco    Blood work today    Follow up in 3 months

## 2018-03-13 ENCOUNTER — TELEPHONE (OUTPATIENT)
Dept: PULMONOLOGY | Age: 58
End: 2018-03-13

## 2018-03-13 DIAGNOSIS — J44.9 COPD, SEVERE (HCC): Primary | ICD-10-CM

## 2018-03-14 LAB
ALPHA-1 ANTITRYPSIN PHENOTYPE: NORMAL
ALPHA-1 ANTITRYPSIN: 113 MG/DL (ref 90–200)

## 2018-03-27 ENCOUNTER — HOSPITAL ENCOUNTER (OUTPATIENT)
Dept: CARDIAC REHAB | Age: 58
Discharge: HOME | End: 2018-03-27
Attending: INTERNAL MEDICINE | Admitting: INTERNAL MEDICINE

## 2018-03-29 ENCOUNTER — OFFICE VISIT (OUTPATIENT)
Dept: PSYCHOLOGY | Age: 58
End: 2018-03-29

## 2018-03-29 DIAGNOSIS — F32.9 REACTIVE DEPRESSION: Primary | ICD-10-CM

## 2018-03-29 PROCEDURE — 90832 PSYTX W PT 30 MINUTES: CPT | Performed by: PSYCHOLOGIST

## 2018-03-29 ASSESSMENT — PATIENT HEALTH QUESTIONNAIRE - PHQ9
7. TROUBLE CONCENTRATING ON THINGS, SUCH AS READING THE NEWSPAPER OR WATCHING TELEVISION: 0
4. FEELING TIRED OR HAVING LITTLE ENERGY: 2
SUM OF ALL RESPONSES TO PHQ9 QUESTIONS 1 & 2: 3
2. FEELING DOWN, DEPRESSED OR HOPELESS: 1
6. FEELING BAD ABOUT YOURSELF - OR THAT YOU ARE A FAILURE OR HAVE LET YOURSELF OR YOUR FAMILY DOWN: 2
10. IF YOU CHECKED OFF ANY PROBLEMS, HOW DIFFICULT HAVE THESE PROBLEMS MADE IT FOR YOU TO DO YOUR WORK, TAKE CARE OF THINGS AT HOME, OR GET ALONG WITH OTHER PEOPLE: 1
3. TROUBLE FALLING OR STAYING ASLEEP: 3
1. LITTLE INTEREST OR PLEASURE IN DOING THINGS: 2
8. MOVING OR SPEAKING SO SLOWLY THAT OTHER PEOPLE COULD HAVE NOTICED. OR THE OPPOSITE, BEING SO FIGETY OR RESTLESS THAT YOU HAVE BEEN MOVING AROUND A LOT MORE THAN USUAL: 1
9. THOUGHTS THAT YOU WOULD BE BETTER OFF DEAD, OR OF HURTING YOURSELF: 2
5. POOR APPETITE OR OVEREATING: 2
SUM OF ALL RESPONSES TO PHQ QUESTIONS 1-9: 15

## 2018-03-29 ASSESSMENT — ANXIETY QUESTIONNAIRES
5. BEING SO RESTLESS THAT IT IS HARD TO SIT STILL: 1-SEVERAL DAYS
GAD7 TOTAL SCORE: 10
1. FEELING NERVOUS, ANXIOUS, OR ON EDGE: 1-SEVERAL DAYS
7. FEELING AFRAID AS IF SOMETHING AWFUL MIGHT HAPPEN: 1-SEVERAL DAYS
3. WORRYING TOO MUCH ABOUT DIFFERENT THINGS: 2-OVER HALF THE DAYS
6. BECOMING EASILY ANNOYED OR IRRITABLE: 1-SEVERAL DAYS
2. NOT BEING ABLE TO STOP OR CONTROL WORRYING: 2-OVER HALF THE DAYS
4. TROUBLE RELAXING: 2-OVER HALF THE DAYS

## 2018-04-26 ENCOUNTER — OFFICE VISIT (OUTPATIENT)
Dept: PSYCHOLOGY | Age: 58
End: 2018-04-26

## 2018-04-26 DIAGNOSIS — F32.9 REACTIVE DEPRESSION: Primary | ICD-10-CM

## 2018-04-26 PROCEDURE — 90832 PSYTX W PT 30 MINUTES: CPT | Performed by: PSYCHOLOGIST

## 2018-04-26 ASSESSMENT — ANXIETY QUESTIONNAIRES
7. FEELING AFRAID AS IF SOMETHING AWFUL MIGHT HAPPEN: 1-SEVERAL DAYS
1. FEELING NERVOUS, ANXIOUS, OR ON EDGE: 1-SEVERAL DAYS
6. BECOMING EASILY ANNOYED OR IRRITABLE: 1-SEVERAL DAYS
5. BEING SO RESTLESS THAT IT IS HARD TO SIT STILL: 2-OVER HALF THE DAYS
GAD7 TOTAL SCORE: 12
4. TROUBLE RELAXING: 3-NEARLY EVERY DAY
3. WORRYING TOO MUCH ABOUT DIFFERENT THINGS: 2-OVER HALF THE DAYS
2. NOT BEING ABLE TO STOP OR CONTROL WORRYING: 2-OVER HALF THE DAYS

## 2018-04-26 ASSESSMENT — PATIENT HEALTH QUESTIONNAIRE - PHQ9
2. FEELING DOWN, DEPRESSED OR HOPELESS: 2
6. FEELING BAD ABOUT YOURSELF - OR THAT YOU ARE A FAILURE OR HAVE LET YOURSELF OR YOUR FAMILY DOWN: 1
1. LITTLE INTEREST OR PLEASURE IN DOING THINGS: 1
8. MOVING OR SPEAKING SO SLOWLY THAT OTHER PEOPLE COULD HAVE NOTICED. OR THE OPPOSITE, BEING SO FIGETY OR RESTLESS THAT YOU HAVE BEEN MOVING AROUND A LOT MORE THAN USUAL: 0
4. FEELING TIRED OR HAVING LITTLE ENERGY: 2
9. THOUGHTS THAT YOU WOULD BE BETTER OFF DEAD, OR OF HURTING YOURSELF: 0
7. TROUBLE CONCENTRATING ON THINGS, SUCH AS READING THE NEWSPAPER OR WATCHING TELEVISION: 1
3. TROUBLE FALLING OR STAYING ASLEEP: 3
10. IF YOU CHECKED OFF ANY PROBLEMS, HOW DIFFICULT HAVE THESE PROBLEMS MADE IT FOR YOU TO DO YOUR WORK, TAKE CARE OF THINGS AT HOME, OR GET ALONG WITH OTHER PEOPLE: 0
SUM OF ALL RESPONSES TO PHQ9 QUESTIONS 1 & 2: 3
5. POOR APPETITE OR OVEREATING: 3
SUM OF ALL RESPONSES TO PHQ QUESTIONS 1-9: 13

## 2018-05-24 ENCOUNTER — OFFICE VISIT (OUTPATIENT)
Dept: PSYCHOLOGY | Age: 58
End: 2018-05-24

## 2018-05-24 DIAGNOSIS — F32.9 REACTIVE DEPRESSION: ICD-10-CM

## 2018-05-24 DIAGNOSIS — F41.1 GAD (GENERALIZED ANXIETY DISORDER): Primary | ICD-10-CM

## 2018-05-24 PROCEDURE — 90832 PSYTX W PT 30 MINUTES: CPT | Performed by: PSYCHOLOGIST

## 2018-05-24 ASSESSMENT — PATIENT HEALTH QUESTIONNAIRE - PHQ9
5. POOR APPETITE OR OVEREATING: 2
7. TROUBLE CONCENTRATING ON THINGS, SUCH AS READING THE NEWSPAPER OR WATCHING TELEVISION: 0
4. FEELING TIRED OR HAVING LITTLE ENERGY: 3
3. TROUBLE FALLING OR STAYING ASLEEP: 3
SUM OF ALL RESPONSES TO PHQ QUESTIONS 1-9: 14
9. THOUGHTS THAT YOU WOULD BE BETTER OFF DEAD, OR OF HURTING YOURSELF: 1
8. MOVING OR SPEAKING SO SLOWLY THAT OTHER PEOPLE COULD HAVE NOTICED. OR THE OPPOSITE, BEING SO FIGETY OR RESTLESS THAT YOU HAVE BEEN MOVING AROUND A LOT MORE THAN USUAL: 1
SUM OF ALL RESPONSES TO PHQ9 QUESTIONS 1 & 2: 3
1. LITTLE INTEREST OR PLEASURE IN DOING THINGS: 2
2. FEELING DOWN, DEPRESSED OR HOPELESS: 1
10. IF YOU CHECKED OFF ANY PROBLEMS, HOW DIFFICULT HAVE THESE PROBLEMS MADE IT FOR YOU TO DO YOUR WORK, TAKE CARE OF THINGS AT HOME, OR GET ALONG WITH OTHER PEOPLE: 0
6. FEELING BAD ABOUT YOURSELF - OR THAT YOU ARE A FAILURE OR HAVE LET YOURSELF OR YOUR FAMILY DOWN: 1

## 2018-05-25 ASSESSMENT — ANXIETY QUESTIONNAIRES
2. NOT BEING ABLE TO STOP OR CONTROL WORRYING: 2-OVER HALF THE DAYS
4. TROUBLE RELAXING: 3-NEARLY EVERY DAY
1. FEELING NERVOUS, ANXIOUS, OR ON EDGE: 1-SEVERAL DAYS
5. BEING SO RESTLESS THAT IT IS HARD TO SIT STILL: 1-SEVERAL DAYS
3. WORRYING TOO MUCH ABOUT DIFFERENT THINGS: 2-OVER HALF THE DAYS
6. BECOMING EASILY ANNOYED OR IRRITABLE: 1-SEVERAL DAYS
7. FEELING AFRAID AS IF SOMETHING AWFUL MIGHT HAPPEN: 0-NOT AT ALL SURE
GAD7 TOTAL SCORE: 10

## 2018-06-20 ENCOUNTER — OFFICE VISIT (OUTPATIENT)
Dept: PULMONOLOGY | Age: 58
End: 2018-06-20

## 2018-06-20 VITALS
WEIGHT: 120 LBS | HEART RATE: 96 BPM | HEIGHT: 64 IN | SYSTOLIC BLOOD PRESSURE: 133 MMHG | TEMPERATURE: 97.7 F | BODY MASS INDEX: 20.49 KG/M2 | DIASTOLIC BLOOD PRESSURE: 80 MMHG | RESPIRATION RATE: 16 BRPM | OXYGEN SATURATION: 98 %

## 2018-06-20 DIAGNOSIS — R91.1 LUNG NODULE: ICD-10-CM

## 2018-06-20 DIAGNOSIS — J43.2 CENTRILOBULAR EMPHYSEMA (HCC): ICD-10-CM

## 2018-06-20 DIAGNOSIS — J44.9 COPD, SEVERE (HCC): Primary | ICD-10-CM

## 2018-06-20 DIAGNOSIS — Z72.0 TOBACCO ABUSE: ICD-10-CM

## 2018-06-20 PROCEDURE — G8420 CALC BMI NORM PARAMETERS: HCPCS | Performed by: INTERNAL MEDICINE

## 2018-06-20 PROCEDURE — 4004F PT TOBACCO SCREEN RCVD TLK: CPT | Performed by: INTERNAL MEDICINE

## 2018-06-20 PROCEDURE — 99214 OFFICE O/P EST MOD 30 MIN: CPT | Performed by: INTERNAL MEDICINE

## 2018-06-20 PROCEDURE — G8427 DOCREV CUR MEDS BY ELIG CLIN: HCPCS | Performed by: INTERNAL MEDICINE

## 2018-06-20 PROCEDURE — 3023F SPIROM DOC REV: CPT | Performed by: INTERNAL MEDICINE

## 2018-06-20 PROCEDURE — 3017F COLORECTAL CA SCREEN DOC REV: CPT | Performed by: INTERNAL MEDICINE

## 2018-06-20 PROCEDURE — G8926 SPIRO NO PERF OR DOC: HCPCS | Performed by: INTERNAL MEDICINE

## 2018-06-28 ENCOUNTER — OFFICE VISIT (OUTPATIENT)
Dept: PSYCHOLOGY | Age: 58
End: 2018-06-28

## 2018-06-28 DIAGNOSIS — F41.1 GAD (GENERALIZED ANXIETY DISORDER): Primary | ICD-10-CM

## 2018-06-28 DIAGNOSIS — F32.9 REACTIVE DEPRESSION: ICD-10-CM

## 2018-06-28 PROCEDURE — 90834 PSYTX W PT 45 MINUTES: CPT | Performed by: PSYCHOLOGIST

## 2018-06-28 ASSESSMENT — ANXIETY QUESTIONNAIRES
GAD7 TOTAL SCORE: 11
7. FEELING AFRAID AS IF SOMETHING AWFUL MIGHT HAPPEN: 1-SEVERAL DAYS
3. WORRYING TOO MUCH ABOUT DIFFERENT THINGS: 2-OVER HALF THE DAYS
1. FEELING NERVOUS, ANXIOUS, OR ON EDGE: 2-OVER HALF THE DAYS
2. NOT BEING ABLE TO STOP OR CONTROL WORRYING: 2-OVER HALF THE DAYS
6. BECOMING EASILY ANNOYED OR IRRITABLE: 1-SEVERAL DAYS
5. BEING SO RESTLESS THAT IT IS HARD TO SIT STILL: 1-SEVERAL DAYS
4. TROUBLE RELAXING: 2-OVER HALF THE DAYS

## 2018-06-28 ASSESSMENT — PATIENT HEALTH QUESTIONNAIRE - PHQ9
9. THOUGHTS THAT YOU WOULD BE BETTER OFF DEAD, OR OF HURTING YOURSELF: 1
2. FEELING DOWN, DEPRESSED OR HOPELESS: 2
3. TROUBLE FALLING OR STAYING ASLEEP: 2
SUM OF ALL RESPONSES TO PHQ QUESTIONS 1-9: 13
1. LITTLE INTEREST OR PLEASURE IN DOING THINGS: 1
10. IF YOU CHECKED OFF ANY PROBLEMS, HOW DIFFICULT HAVE THESE PROBLEMS MADE IT FOR YOU TO DO YOUR WORK, TAKE CARE OF THINGS AT HOME, OR GET ALONG WITH OTHER PEOPLE: 0
4. FEELING TIRED OR HAVING LITTLE ENERGY: 2
5. POOR APPETITE OR OVEREATING: 2
7. TROUBLE CONCENTRATING ON THINGS, SUCH AS READING THE NEWSPAPER OR WATCHING TELEVISION: 0
6. FEELING BAD ABOUT YOURSELF - OR THAT YOU ARE A FAILURE OR HAVE LET YOURSELF OR YOUR FAMILY DOWN: 2
SUM OF ALL RESPONSES TO PHQ9 QUESTIONS 1 & 2: 3
8. MOVING OR SPEAKING SO SLOWLY THAT OTHER PEOPLE COULD HAVE NOTICED. OR THE OPPOSITE, BEING SO FIGETY OR RESTLESS THAT YOU HAVE BEEN MOVING AROUND A LOT MORE THAN USUAL: 1

## 2018-07-24 ENCOUNTER — OFFICE VISIT (OUTPATIENT)
Dept: FAMILY MEDICINE CLINIC | Age: 58
End: 2018-07-24

## 2018-07-24 VITALS
HEART RATE: 89 BPM | WEIGHT: 121 LBS | BODY MASS INDEX: 20.66 KG/M2 | OXYGEN SATURATION: 98 % | HEIGHT: 64 IN | DIASTOLIC BLOOD PRESSURE: 79 MMHG | SYSTOLIC BLOOD PRESSURE: 147 MMHG

## 2018-07-24 DIAGNOSIS — R92.8 ABNORMAL MAMMOGRAM: ICD-10-CM

## 2018-07-24 DIAGNOSIS — E04.2 MULTINODULAR THYROID: ICD-10-CM

## 2018-07-24 DIAGNOSIS — R03.0 ELEVATED BLOOD PRESSURE READING: ICD-10-CM

## 2018-07-24 DIAGNOSIS — F17.200 TOBACCO DEPENDENCE: ICD-10-CM

## 2018-07-24 DIAGNOSIS — J43.1 PANLOBULAR EMPHYSEMA (HCC): ICD-10-CM

## 2018-07-24 DIAGNOSIS — E55.9 VITAMIN D DEFICIENCY: ICD-10-CM

## 2018-07-24 DIAGNOSIS — F40.243 FEAR OF FLYING: Primary | ICD-10-CM

## 2018-07-24 DIAGNOSIS — E78.01 FAMILIAL HYPERCHOLESTEROLEMIA: ICD-10-CM

## 2018-07-24 LAB
ALBUMIN SERPL-MCNC: 4.4 G/DL (ref 3.4–5)
ALP BLD-CCNC: 144 U/L (ref 40–129)
ALT SERPL-CCNC: 10 U/L (ref 10–40)
ANION GAP SERPL CALCULATED.3IONS-SCNC: 13 MMOL/L (ref 3–16)
AST SERPL-CCNC: 17 U/L (ref 15–37)
BILIRUB SERPL-MCNC: 0.3 MG/DL (ref 0–1)
BILIRUBIN DIRECT: <0.2 MG/DL (ref 0–0.3)
BILIRUBIN, INDIRECT: ABNORMAL MG/DL (ref 0–1)
BUN BLDV-MCNC: 4 MG/DL (ref 7–20)
CALCIUM SERPL-MCNC: 9.4 MG/DL (ref 8.3–10.6)
CHLORIDE BLD-SCNC: 104 MMOL/L (ref 99–110)
CHOLESTEROL, TOTAL: 240 MG/DL (ref 0–199)
CO2: 24 MMOL/L (ref 21–32)
CREAT SERPL-MCNC: 0.6 MG/DL (ref 0.6–1.1)
GFR AFRICAN AMERICAN: >60
GFR NON-AFRICAN AMERICAN: >60
GLUCOSE BLD-MCNC: 67 MG/DL (ref 70–99)
HDLC SERPL-MCNC: 53 MG/DL (ref 40–60)
LDL CHOLESTEROL CALCULATED: 160 MG/DL
POTASSIUM SERPL-SCNC: 3.8 MMOL/L (ref 3.5–5.1)
SODIUM BLD-SCNC: 141 MMOL/L (ref 136–145)
TOTAL PROTEIN: 6.8 G/DL (ref 6.4–8.2)
TRIGL SERPL-MCNC: 136 MG/DL (ref 0–150)
TSH REFLEX FT4: 1.14 UIU/ML (ref 0.27–4.2)
VLDLC SERPL CALC-MCNC: 27 MG/DL

## 2018-07-24 PROCEDURE — 3023F SPIROM DOC REV: CPT | Performed by: INTERNAL MEDICINE

## 2018-07-24 PROCEDURE — G8420 CALC BMI NORM PARAMETERS: HCPCS | Performed by: INTERNAL MEDICINE

## 2018-07-24 PROCEDURE — 3017F COLORECTAL CA SCREEN DOC REV: CPT | Performed by: INTERNAL MEDICINE

## 2018-07-24 PROCEDURE — 36415 COLL VENOUS BLD VENIPUNCTURE: CPT | Performed by: INTERNAL MEDICINE

## 2018-07-24 PROCEDURE — 4004F PT TOBACCO SCREEN RCVD TLK: CPT | Performed by: INTERNAL MEDICINE

## 2018-07-24 PROCEDURE — G8926 SPIRO NO PERF OR DOC: HCPCS | Performed by: INTERNAL MEDICINE

## 2018-07-24 PROCEDURE — G8427 DOCREV CUR MEDS BY ELIG CLIN: HCPCS | Performed by: INTERNAL MEDICINE

## 2018-07-24 PROCEDURE — 99214 OFFICE O/P EST MOD 30 MIN: CPT | Performed by: INTERNAL MEDICINE

## 2018-07-24 RX ORDER — ALPRAZOLAM 0.25 MG/1
0.25 TABLET ORAL 3 TIMES DAILY PRN
Qty: 3 TABLET | Refills: 0 | Status: SHIPPED | OUTPATIENT
Start: 2018-07-24 | End: 2018-07-24 | Stop reason: SDUPTHER

## 2018-07-24 RX ORDER — ALPRAZOLAM 0.25 MG/1
TABLET ORAL
Qty: 3 TABLET | Refills: 0 | Status: SHIPPED | OUTPATIENT
Start: 2018-07-24 | End: 2018-07-26

## 2018-07-24 NOTE — PATIENT INSTRUCTIONS
Call for mammogram  Due for recheck ultrasound thyroid  Sun screen  proair before strenuous activity  See you 4 months.

## 2018-07-24 NOTE — PROGRESS NOTES
Multinodular thyroid  TSH WITH REFLEX TO FT4    US THYROID   8. Elevated blood pressure reading  Basic Metabolic Panel       Fear flying. Xanax No. 3. Side effects and benefits discussed. History abnormal mammogram mammogram ordered. Emphysema. Suggested tobacco cessation. Continue current medications. He may use pro-air prior to activity. Hyperlipidemia improved with statin. We'll recheck again today. Vitamin D deficiency with mild elevation of alkaline phosphatase in the past we'll recheck today. Multinodular goiter goiter due repeat ultrasound. This was ordered    Elevated blood pressure. We'll recheck again tomorrow. Insomnia with anxiety and depression. Follow-up psychology tomorrow. Start Pamelor at nighttime    No Follow-up on file. Diagnosis and treatment discussed.   Possible side effects of medication reviewed  Patients questions answered  Follow up understood  Pt aware if they are not contacted about any test results , this does not mean they are normal.  They should call

## 2018-07-25 ENCOUNTER — OFFICE VISIT (OUTPATIENT)
Dept: PSYCHOLOGY | Age: 58
End: 2018-07-25

## 2018-07-25 DIAGNOSIS — F41.1 GAD (GENERALIZED ANXIETY DISORDER): Primary | ICD-10-CM

## 2018-07-25 LAB — VITAMIN D 25-HYDROXY: 23.3 NG/ML

## 2018-07-25 PROCEDURE — 90832 PSYTX W PT 30 MINUTES: CPT | Performed by: PSYCHOLOGIST

## 2018-07-25 ASSESSMENT — ANXIETY QUESTIONNAIRES
3. WORRYING TOO MUCH ABOUT DIFFERENT THINGS: 2-OVER HALF THE DAYS
5. BEING SO RESTLESS THAT IT IS HARD TO SIT STILL: 0-NOT AT ALL SURE
7. FEELING AFRAID AS IF SOMETHING AWFUL MIGHT HAPPEN: 0-NOT AT ALL SURE
6. BECOMING EASILY ANNOYED OR IRRITABLE: 0-NOT AT ALL SURE
4. TROUBLE RELAXING: 1-SEVERAL DAYS
GAD7 TOTAL SCORE: 6
1. FEELING NERVOUS, ANXIOUS, OR ON EDGE: 1-SEVERAL DAYS
2. NOT BEING ABLE TO STOP OR CONTROL WORRYING: 2-OVER HALF THE DAYS

## 2018-07-25 ASSESSMENT — PATIENT HEALTH QUESTIONNAIRE - PHQ9
2. FEELING DOWN, DEPRESSED OR HOPELESS: 1
SUM OF ALL RESPONSES TO PHQ QUESTIONS 1-9: 1
SUM OF ALL RESPONSES TO PHQ9 QUESTIONS 1 & 2: 1
1. LITTLE INTEREST OR PLEASURE IN DOING THINGS: 0

## 2018-07-25 NOTE — PROGRESS NOTES
capsule into the lungs daily 30 capsule 3    ondansetron (ZOFRAN) 4 MG tablet Take 1 tablet by mouth every 12 hours as needed for Nausea or Vomiting 20 tablet 0    nortriptyline (PAMELOR) 10 MG capsule 3 to 4 pills prior to sleep 120 capsule 0    atorvastatin (LIPITOR) 40 MG tablet 1 po q day follow up on medication fasting in 2 months 90 tablet 3    budesonide-formoterol (SYMBICORT) 160-4.5 MCG/ACT AERO INHALE 2 PUFFS INTO THE LUNGS TWICE DAILY 10.2 g 5    buPROPion (WELLBUTRIN XL) 150 MG extended release tablet TAKE 1 TABLET BY MOUTH TWICE DAILY 60 tablet 5    albuterol-ipratropium (COMBIVENT)  MCG/ACT inhaler 1 puffs QID for wheezing to replace proair. If cost prohibitive, may use proair 1 Inhaler 3    omeprazole (PRILOSEC OTC) 20 MG tablet Take 1 tablet by mouth daily 30 tablet 3    albuterol sulfate  (90 BASE) MCG/ACT inhaler Use 2 puffs 4 times daily for 7 days then as needed for wheezing. Dispense with Spacer and instruct in use. At patient's preference may use 60 dose MDI. May Sub Pro-Air or Proventil as needed per insurance. 1 Inhaler 1     No current facility-administered medications for this visit. Social History:   Social History     Social History    Marital status:      Spouse name: N/A    Number of children: N/A    Years of education: N/A     Occupational History    Not on file. Social History Main Topics    Smoking status: Current Every Day Smoker     Packs/day: 4.00     Years: 50.00     Types: Cigarettes    Smokeless tobacco: Never Used      Comment: 3-4 packs per day     Alcohol use No    Drug use: No    Sexual activity: Yes     Partners: Male     Other Topics Concern    Not on file     Social History Narrative    No narrative on file       TOBACCO:   reports that she has been smoking Cigarettes. She has a 200.00 pack-year smoking history. She has never used smokeless tobacco.  ETOH:   reports that she does not drink alcohol.     Family History:

## 2018-08-23 ENCOUNTER — OFFICE VISIT (OUTPATIENT)
Dept: PSYCHOLOGY | Age: 58
End: 2018-08-23

## 2018-08-23 DIAGNOSIS — F32.9 REACTIVE DEPRESSION: ICD-10-CM

## 2018-08-23 DIAGNOSIS — F41.1 GAD (GENERALIZED ANXIETY DISORDER): Primary | ICD-10-CM

## 2018-08-23 PROCEDURE — 90832 PSYTX W PT 30 MINUTES: CPT | Performed by: PSYCHOLOGIST

## 2018-08-23 ASSESSMENT — ANXIETY QUESTIONNAIRES
5. BEING SO RESTLESS THAT IT IS HARD TO SIT STILL: 1-SEVERAL DAYS
6. BECOMING EASILY ANNOYED OR IRRITABLE: 1-SEVERAL DAYS
3. WORRYING TOO MUCH ABOUT DIFFERENT THINGS: 2-OVER HALF THE DAYS
GAD7 TOTAL SCORE: 8
7. FEELING AFRAID AS IF SOMETHING AWFUL MIGHT HAPPEN: 0-NOT AT ALL SURE
2. NOT BEING ABLE TO STOP OR CONTROL WORRYING: 2-OVER HALF THE DAYS
1. FEELING NERVOUS, ANXIOUS, OR ON EDGE: 1-SEVERAL DAYS
4. TROUBLE RELAXING: 1-SEVERAL DAYS

## 2018-08-23 ASSESSMENT — PATIENT HEALTH QUESTIONNAIRE - PHQ9
SUM OF ALL RESPONSES TO PHQ QUESTIONS 1-9: 2
2. FEELING DOWN, DEPRESSED OR HOPELESS: 1
1. LITTLE INTEREST OR PLEASURE IN DOING THINGS: 1
SUM OF ALL RESPONSES TO PHQ QUESTIONS 1-9: 2
SUM OF ALL RESPONSES TO PHQ9 QUESTIONS 1 & 2: 2
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
## 2018-08-27 ENCOUNTER — HOSPITAL ENCOUNTER (OUTPATIENT)
Dept: WOMENS IMAGING | Age: 58
Discharge: HOME | End: 2018-08-27
Attending: INTERNAL MEDICINE | Admitting: INTERNAL MEDICINE

## 2018-08-27 DIAGNOSIS — E04.2 MULTINODULAR THYROID: ICD-10-CM

## 2018-08-27 DIAGNOSIS — R92.8 OTHER ABNORMAL AND INCONCLUSIVE FINDINGS ON DIAGNOSTIC IMAGING OF BREAST: ICD-10-CM

## 2018-08-27 DIAGNOSIS — R92.8 ABNORMAL MAMMOGRAM: ICD-10-CM

## 2018-09-13 ENCOUNTER — OFFICE VISIT (OUTPATIENT)
Dept: PSYCHOLOGY | Age: 58
End: 2018-09-13

## 2018-09-13 DIAGNOSIS — F41.1 GAD (GENERALIZED ANXIETY DISORDER): Primary | ICD-10-CM

## 2018-09-13 DIAGNOSIS — F32.9 REACTIVE DEPRESSION: ICD-10-CM

## 2018-09-13 PROCEDURE — 90834 PSYTX W PT 45 MINUTES: CPT | Performed by: PSYCHOLOGIST

## 2018-09-13 ASSESSMENT — ANXIETY QUESTIONNAIRES
6. BECOMING EASILY ANNOYED OR IRRITABLE: 1-SEVERAL DAYS
4. TROUBLE RELAXING: 1-SEVERAL DAYS
7. FEELING AFRAID AS IF SOMETHING AWFUL MIGHT HAPPEN: 3-NEARLY EVERY DAY
2. NOT BEING ABLE TO STOP OR CONTROL WORRYING: 2-OVER HALF THE DAYS
1. FEELING NERVOUS, ANXIOUS, OR ON EDGE: 1-SEVERAL DAYS
GAD7 TOTAL SCORE: 11
5. BEING SO RESTLESS THAT IT IS HARD TO SIT STILL: 1-SEVERAL DAYS
3. WORRYING TOO MUCH ABOUT DIFFERENT THINGS: 2-OVER HALF THE DAYS

## 2018-09-13 ASSESSMENT — PATIENT HEALTH QUESTIONNAIRE - PHQ9
5. POOR APPETITE OR OVEREATING: 1
SUM OF ALL RESPONSES TO PHQ QUESTIONS 1-9: 13
1. LITTLE INTEREST OR PLEASURE IN DOING THINGS: 1
8. MOVING OR SPEAKING SO SLOWLY THAT OTHER PEOPLE COULD HAVE NOTICED. OR THE OPPOSITE, BEING SO FIGETY OR RESTLESS THAT YOU HAVE BEEN MOVING AROUND A LOT MORE THAN USUAL: 1
6. FEELING BAD ABOUT YOURSELF - OR THAT YOU ARE A FAILURE OR HAVE LET YOURSELF OR YOUR FAMILY DOWN: 3
4. FEELING TIRED OR HAVING LITTLE ENERGY: 1
SUM OF ALL RESPONSES TO PHQ QUESTIONS 1-9: 13
SUM OF ALL RESPONSES TO PHQ9 QUESTIONS 1 & 2: 3
9. THOUGHTS THAT YOU WOULD BE BETTER OFF DEAD, OR OF HURTING YOURSELF: 2
3. TROUBLE FALLING OR STAYING ASLEEP: 1
7. TROUBLE CONCENTRATING ON THINGS, SUCH AS READING THE NEWSPAPER OR WATCHING TELEVISION: 1
10. IF YOU CHECKED OFF ANY PROBLEMS, HOW DIFFICULT HAVE THESE PROBLEMS MADE IT FOR YOU TO DO YOUR WORK, TAKE CARE OF THINGS AT HOME, OR GET ALONG WITH OTHER PEOPLE: 1
2. FEELING DOWN, DEPRESSED OR HOPELESS: 2
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
## 2018-09-19 ENCOUNTER — OFFICE VISIT (OUTPATIENT)
Dept: PSYCHOLOGY | Age: 58
End: 2018-09-19

## 2018-09-19 DIAGNOSIS — F32.9 REACTIVE DEPRESSION: ICD-10-CM

## 2018-09-19 DIAGNOSIS — F41.1 GAD (GENERALIZED ANXIETY DISORDER): Primary | ICD-10-CM

## 2018-09-19 PROCEDURE — 90847 FAMILY PSYTX W/PT 50 MIN: CPT | Performed by: PSYCHOLOGIST

## 2018-09-19 ASSESSMENT — PATIENT HEALTH QUESTIONNAIRE - PHQ9
8. MOVING OR SPEAKING SO SLOWLY THAT OTHER PEOPLE COULD HAVE NOTICED. OR THE OPPOSITE, BEING SO FIGETY OR RESTLESS THAT YOU HAVE BEEN MOVING AROUND A LOT MORE THAN USUAL: 1
10. IF YOU CHECKED OFF ANY PROBLEMS, HOW DIFFICULT HAVE THESE PROBLEMS MADE IT FOR YOU TO DO YOUR WORK, TAKE CARE OF THINGS AT HOME, OR GET ALONG WITH OTHER PEOPLE: 0
1. LITTLE INTEREST OR PLEASURE IN DOING THINGS: 1
2. FEELING DOWN, DEPRESSED OR HOPELESS: 2
7. TROUBLE CONCENTRATING ON THINGS, SUCH AS READING THE NEWSPAPER OR WATCHING TELEVISION: 1
SUM OF ALL RESPONSES TO PHQ QUESTIONS 1-9: 16
3. TROUBLE FALLING OR STAYING ASLEEP: 2
4. FEELING TIRED OR HAVING LITTLE ENERGY: 2
SUM OF ALL RESPONSES TO PHQ QUESTIONS 1-9: 16
6. FEELING BAD ABOUT YOURSELF - OR THAT YOU ARE A FAILURE OR HAVE LET YOURSELF OR YOUR FAMILY DOWN: 3
5. POOR APPETITE OR OVEREATING: 2
SUM OF ALL RESPONSES TO PHQ9 QUESTIONS 1 & 2: 3
9. THOUGHTS THAT YOU WOULD BE BETTER OFF DEAD, OR OF HURTING YOURSELF: 2

## 2018-09-19 ASSESSMENT — ANXIETY QUESTIONNAIRES
1. FEELING NERVOUS, ANXIOUS, OR ON EDGE: 2-OVER HALF THE DAYS
6. BECOMING EASILY ANNOYED OR IRRITABLE: 1-SEVERAL DAYS
5. BEING SO RESTLESS THAT IT IS HARD TO SIT STILL: 1-SEVERAL DAYS
7. FEELING AFRAID AS IF SOMETHING AWFUL MIGHT HAPPEN: 2-OVER HALF THE DAYS
GAD7 TOTAL SCORE: 11
3. WORRYING TOO MUCH ABOUT DIFFERENT THINGS: 2-OVER HALF THE DAYS
2. NOT BEING ABLE TO STOP OR CONTROL WORRYING: 2-OVER HALF THE DAYS
4. TROUBLE RELAXING: 1-SEVERAL DAYS
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
## 2018-09-20 NOTE — PATIENT INSTRUCTIONS
1. Go to ER if mood worsens between medical appointments  2. Continue to take medication as prescribed  3. Talk to daughter about no longer helping with care of mother for the next 2-3 months  4.  Return to clinic for Dr. Thomas Guillaume next week

## 2018-10-02 RX ORDER — BUDESONIDE AND FORMOTEROL FUMARATE DIHYDRATE 160; 4.5 UG/1; UG/1
AEROSOL RESPIRATORY (INHALATION)
Qty: 10.2 G | Refills: 0 | Status: SHIPPED | OUTPATIENT
Start: 2018-10-02 | End: 2019-06-10 | Stop reason: ALTCHOICE

## 2018-10-03 ENCOUNTER — OFFICE VISIT (OUTPATIENT)
Dept: PSYCHOLOGY | Age: 58
End: 2018-10-03
Payer: COMMERCIAL

## 2018-10-03 DIAGNOSIS — F41.1 GENERALIZED ANXIETY DISORDER: Primary | ICD-10-CM

## 2018-10-03 PROCEDURE — 90847 FAMILY PSYTX W/PT 50 MIN: CPT | Performed by: PSYCHOLOGIST

## 2018-10-03 ASSESSMENT — PATIENT HEALTH QUESTIONNAIRE - PHQ9
SUM OF ALL RESPONSES TO PHQ QUESTIONS 1-9: 2
2. FEELING DOWN, DEPRESSED OR HOPELESS: 1
1. LITTLE INTEREST OR PLEASURE IN DOING THINGS: 1
SUM OF ALL RESPONSES TO PHQ9 QUESTIONS 1 & 2: 2
SUM OF ALL RESPONSES TO PHQ QUESTIONS 1-9: 2

## 2018-10-03 ASSESSMENT — ANXIETY QUESTIONNAIRES
7. FEELING AFRAID AS IF SOMETHING AWFUL MIGHT HAPPEN: 1-SEVERAL DAYS
3. WORRYING TOO MUCH ABOUT DIFFERENT THINGS: 2-OVER HALF THE DAYS
2. NOT BEING ABLE TO STOP OR CONTROL WORRYING: 2-OVER HALF THE DAYS
4. TROUBLE RELAXING: 1-SEVERAL DAYS
1. FEELING NERVOUS, ANXIOUS, OR ON EDGE: 1-SEVERAL DAYS
6. BECOMING EASILY ANNOYED OR IRRITABLE: 2-OVER HALF THE DAYS
GAD7 TOTAL SCORE: 10
5. BEING SO RESTLESS THAT IT IS HARD TO SIT STILL: 1-SEVERAL DAYS

## 2018-10-03 NOTE — PROGRESS NOTES
Day Smoker     Packs/day: 4.00     Years: 50.00     Types: Cigarettes    Smokeless tobacco: Never Used      Comment: 3-4 packs per day     Alcohol use No    Drug use: No    Sexual activity: Yes     Partners: Male     Other Topics Concern    Not on file     Social History Narrative    No narrative on file       TOBACCO:   reports that she has been smoking Cigarettes. She has a 200.00 pack-year smoking history. She has never used smokeless tobacco.  ETOH:   reports that she does not drink alcohol. Family History:   Family History   Problem Relation Age of Onset    COPD Father     Breast Cancer Sister          A:    See S: above    PHQ Scores 10/3/2018 9/19/2018 9/13/2018 8/23/2018 7/25/2018 6/28/2018 5/24/2018   PHQ2 Score 2 3 3 2 1 3 3   PHQ9 Score 2 16 13 2 1 13 14     Interpretation of Total Score Depression Severity: 1-4 = Minimal depression, 5-9 = Mild depression, 10-14 = Moderate depression, 15-19 = Moderately severe depression, 20-27 = Severe depression    Depression: feeling bad about self    SOCORRO 7 SCORE 10/3/2018 9/19/2018 9/13/2018 8/23/2018 7/25/2018 6/28/2018 5/25/2018   SOCORRO-7 Total Score 10 11 11 8 6 11 10     Interpretation of SOCORRO-7 score: 5-9 = mild anxiety, 10-14 = moderate anxiety, 15+ = severe anxiety. Recommend referral to behavioral health for scores 10 or greater.       Diagnosis:    SOCORRO, reactive depression      Diagnosis Date    Abnormal screening mammogram 7/31/2017    Dx right pending    Anxiety     Arthritis     Asthma     Colon polyps 10/30/2017    Multiple path pending 10.2017  Recheck 10.2020    Depression     Multinodular thyroid 7/25/2017 7.2017 euthyroid recheck 1 year     Problems with primary support group and Problems related to the social environment    Plan:  Pt interventions:    Practiced assertive communication, Trained in strategies for increasing balanced thinking, Discussed self-care (sleep, nutrition, rewarding activities, social support, exercise), Motivational Interviewing to increase patient confidence and compliance with adhering to behavioral change plan, Discussed potential barriers to change, Supportive techniques and CBT to target excessive guilt getting in way of self-care. Pt Behavioral Change Plan:    1. Daughter, Livier Stager will assertively talk with aunt about offering every 3rd weekend to care for mother (grandmother), do not meet aunt face-to-face, do it over the phone, stay focused on \"broken record\" technique to give aunt choice of every 3rd weekend to care for mother or not at all, repeat  2. Continue to take medication as prescribed  3. Go to ER if mood worsens between medical appointments  4.  Return to clinic for Dr. Lc Sullivan in 1 month or sooner if needed

## 2018-11-07 ENCOUNTER — OFFICE VISIT (OUTPATIENT)
Dept: PSYCHOLOGY | Age: 58
End: 2018-11-07
Payer: COMMERCIAL

## 2018-11-07 DIAGNOSIS — F41.1 GAD (GENERALIZED ANXIETY DISORDER): Primary | ICD-10-CM

## 2018-11-07 DIAGNOSIS — F32.9 REACTIVE DEPRESSION: ICD-10-CM

## 2018-11-07 PROCEDURE — 90832 PSYTX W PT 30 MINUTES: CPT | Performed by: PSYCHOLOGIST

## 2018-11-07 ASSESSMENT — PATIENT HEALTH QUESTIONNAIRE - PHQ9
SUM OF ALL RESPONSES TO PHQ QUESTIONS 1-9: 2
SUM OF ALL RESPONSES TO PHQ QUESTIONS 1-9: 2
2. FEELING DOWN, DEPRESSED OR HOPELESS: 1
SUM OF ALL RESPONSES TO PHQ9 QUESTIONS 1 & 2: 2
1. LITTLE INTEREST OR PLEASURE IN DOING THINGS: 1

## 2018-11-07 ASSESSMENT — ANXIETY QUESTIONNAIRES
7. FEELING AFRAID AS IF SOMETHING AWFUL MIGHT HAPPEN: 0-NOT AT ALL SURE
3. WORRYING TOO MUCH ABOUT DIFFERENT THINGS: 1-SEVERAL DAYS
6. BECOMING EASILY ANNOYED OR IRRITABLE: 1-SEVERAL DAYS
2. NOT BEING ABLE TO STOP OR CONTROL WORRYING: 1-SEVERAL DAYS
4. TROUBLE RELAXING: 0-NOT AT ALL SURE
1. FEELING NERVOUS, ANXIOUS, OR ON EDGE: 0-NOT AT ALL SURE
5. BEING SO RESTLESS THAT IT IS HARD TO SIT STILL: 0-NOT AT ALL SURE
GAD7 TOTAL SCORE: 3

## 2018-11-07 NOTE — Clinical Note
FYI--Natasha looking much better. Depression and anxiety levels back to baseline. F/u with me in 1 month.

## 2018-11-07 NOTE — PROGRESS NOTES
MCG/ACT inhaler Inhale 2 puffs into the lungs every 6 hours as needed for Wheezing 1 Inhaler 3    tiotropium (SPIRIVA HANDIHALER) 18 MCG inhalation capsule Inhale 1 capsule into the lungs daily 30 capsule 3    ondansetron (ZOFRAN) 4 MG tablet Take 1 tablet by mouth every 12 hours as needed for Nausea or Vomiting 20 tablet 0    nortriptyline (PAMELOR) 10 MG capsule 3 to 4 pills prior to sleep 120 capsule 0    atorvastatin (LIPITOR) 40 MG tablet 1 po q day follow up on medication fasting in 2 months 90 tablet 3    albuterol-ipratropium (COMBIVENT)  MCG/ACT inhaler 1 puffs QID for wheezing to replace proair. If cost prohibitive, may use proair 1 Inhaler 3    omeprazole (PRILOSEC OTC) 20 MG tablet Take 1 tablet by mouth daily 30 tablet 3    albuterol sulfate  (90 BASE) MCG/ACT inhaler Use 2 puffs 4 times daily for 7 days then as needed for wheezing. Dispense with Spacer and instruct in use. At patient's preference may use 60 dose MDI. May Sub Pro-Air or Proventil as needed per insurance. 1 Inhaler 1     No current facility-administered medications for this visit. Social History:   Social History     Social History    Marital status:      Spouse name: N/A    Number of children: N/A    Years of education: N/A     Occupational History    Not on file. Social History Main Topics    Smoking status: Current Every Day Smoker     Packs/day: 4.00     Years: 50.00     Types: Cigarettes    Smokeless tobacco: Never Used      Comment: 3-4 packs per day     Alcohol use No    Drug use: No    Sexual activity: Yes     Partners: Male     Other Topics Concern    Not on file     Social History Narrative    No narrative on file       TOBACCO:   reports that she has been smoking Cigarettes. She has a 200.00 pack-year smoking history. She has never used smokeless tobacco.  ETOH:   reports that she does not drink alcohol.     Family History:   Family History   Problem Relation Age of Onset    COPD Father     Breast Cancer Sister          A:    See S: above    PHQ Scores 11/7/2018 10/3/2018 9/19/2018 9/13/2018 8/23/2018 7/25/2018 6/28/2018   PHQ2 Score 2 2 3 3 2 1 3   PHQ9 Score 2 2 16 13 2 1 13     Interpretation of Total Score Depression Severity: 1-4 = Minimal depression, 5-9 = Mild depression, 10-14 = Moderate depression, 15-19 = Moderately severe depression, 20-27 = Severe depression    SOCORRO 7 SCORE 11/7/2018 10/3/2018 9/19/2018 9/13/2018 8/23/2018 7/25/2018 6/28/2018   SOCORRO-7 Total Score 3 10 11 11 8 6 11     Interpretation of SOCORRO-7 score: 5-9 = mild anxiety, 10-14 = moderate anxiety, 15+ = severe anxiety. Recommend referral to behavioral health for scores 10 or greater. Denied any si/hi risk. Diagnosis:    SOCORRO, reactive depression      Diagnosis Date    Abnormal screening mammogram 7/31/2017    Dx right pending    Anxiety     Arthritis     Asthma     Colon polyps 10/30/2017    Multiple path pending 10.2017  Recheck 10.2020    Depression     Multinodular thyroid 7/25/2017 7.2017 euthyroid recheck 1 year     Problems with primary support group      Plan:  Pt interventions:    Practiced assertive communication, Trained in strategies for increasing balanced thinking, Discussed self-care (sleep, nutrition, rewarding activities, social support, exercise), Discussed and problem-solved barriers in adhering to behavioral change plan, Discussed potential barriers to change and Supportive techniques    Pt Behavioral Change Plan:    1. Continue to practice assertive communication skills with family and others for stress reduction  2. Continue to take medication as prescribed  3. Go to ER if mood worsens between medical appointments  4. Spend time with family, cooking dinner for them the day after Thanksgiving which is a tradition Shayyenoc Kraus loved! 5.  Return to clinic for Dr. Rosalba Hedrick in 1 month or sooner if needed

## 2018-11-11 DIAGNOSIS — F17.200 TOBACCO DEPENDENCE: Primary | ICD-10-CM

## 2018-11-12 RX ORDER — BUPROPION HYDROCHLORIDE 150 MG/1
TABLET ORAL
Qty: 60 TABLET | Refills: 0 | Status: SHIPPED | OUTPATIENT
Start: 2018-11-12 | End: 2018-12-17 | Stop reason: SDUPTHER

## 2018-12-05 ENCOUNTER — OFFICE VISIT (OUTPATIENT)
Dept: PSYCHOLOGY | Age: 58
End: 2018-12-05
Payer: COMMERCIAL

## 2018-12-05 DIAGNOSIS — F41.1 GENERALIZED ANXIETY DISORDER: Primary | ICD-10-CM

## 2018-12-05 PROCEDURE — 90832 PSYTX W PT 30 MINUTES: CPT | Performed by: PSYCHOLOGIST

## 2018-12-05 ASSESSMENT — ANXIETY QUESTIONNAIRES
3. WORRYING TOO MUCH ABOUT DIFFERENT THINGS: 2-OVER HALF THE DAYS
6. BECOMING EASILY ANNOYED OR IRRITABLE: 0-NOT AT ALL SURE
GAD7 TOTAL SCORE: 4
5. BEING SO RESTLESS THAT IT IS HARD TO SIT STILL: 0-NOT AT ALL SURE
4. TROUBLE RELAXING: 0-NOT AT ALL SURE
7. FEELING AFRAID AS IF SOMETHING AWFUL MIGHT HAPPEN: 0-NOT AT ALL SURE
1. FEELING NERVOUS, ANXIOUS, OR ON EDGE: 0-NOT AT ALL SURE
2. NOT BEING ABLE TO STOP OR CONTROL WORRYING: 2-OVER HALF THE DAYS

## 2018-12-05 ASSESSMENT — PATIENT HEALTH QUESTIONNAIRE - PHQ9
1. LITTLE INTEREST OR PLEASURE IN DOING THINGS: 1
2. FEELING DOWN, DEPRESSED OR HOPELESS: 1
SUM OF ALL RESPONSES TO PHQ QUESTIONS 1-9: 2
SUM OF ALL RESPONSES TO PHQ9 QUESTIONS 1 & 2: 2
SUM OF ALL RESPONSES TO PHQ QUESTIONS 1-9: 2

## 2018-12-05 NOTE — PATIENT INSTRUCTIONS
1. Continue to practice assertive communication skills with family and others for stress reduction  2. Continue to take medication as prescribed  3. Go to ER if mood worsens between medical appointments  4. Spend time with family on Manuel adan and day! 5.  Return to clinic for Dr. Shanel Marcial in 1 month or sooner if needed

## 2018-12-05 NOTE — PROGRESS NOTES
take medication as prescribed  3. Go to ER if mood worsens between medical appointments  4. Spend time with family on Christmas eve and day! 5.  Return to clinic for Dr. Wilber Malik in 1 month or sooner if needed

## 2018-12-11 ENCOUNTER — TELEPHONE (OUTPATIENT)
Dept: PULMONOLOGY | Age: 58
End: 2018-12-11

## 2018-12-11 ENCOUNTER — OFFICE VISIT (OUTPATIENT)
Dept: PULMONOLOGY | Age: 58
End: 2018-12-11
Payer: COMMERCIAL

## 2018-12-11 VITALS
OXYGEN SATURATION: 98 % | BODY MASS INDEX: 20.14 KG/M2 | TEMPERATURE: 99.5 F | DIASTOLIC BLOOD PRESSURE: 84 MMHG | HEIGHT: 64 IN | HEART RATE: 90 BPM | WEIGHT: 118 LBS | SYSTOLIC BLOOD PRESSURE: 120 MMHG | RESPIRATION RATE: 16 BRPM

## 2018-12-11 DIAGNOSIS — J43.2 CENTRILOBULAR EMPHYSEMA (HCC): ICD-10-CM

## 2018-12-11 DIAGNOSIS — Z87.891 PERSONAL HISTORY OF TOBACCO USE: ICD-10-CM

## 2018-12-11 DIAGNOSIS — J44.9 COPD, SEVERE (HCC): Primary | ICD-10-CM

## 2018-12-11 DIAGNOSIS — R91.1 LUNG NODULE: ICD-10-CM

## 2018-12-11 DIAGNOSIS — Z72.0 TOBACCO ABUSE: ICD-10-CM

## 2018-12-11 PROCEDURE — G8420 CALC BMI NORM PARAMETERS: HCPCS | Performed by: INTERNAL MEDICINE

## 2018-12-11 PROCEDURE — 4004F PT TOBACCO SCREEN RCVD TLK: CPT | Performed by: INTERNAL MEDICINE

## 2018-12-11 PROCEDURE — G8482 FLU IMMUNIZE ORDER/ADMIN: HCPCS | Performed by: INTERNAL MEDICINE

## 2018-12-11 PROCEDURE — G8427 DOCREV CUR MEDS BY ELIG CLIN: HCPCS | Performed by: INTERNAL MEDICINE

## 2018-12-11 PROCEDURE — 3023F SPIROM DOC REV: CPT | Performed by: INTERNAL MEDICINE

## 2018-12-11 PROCEDURE — G8926 SPIRO NO PERF OR DOC: HCPCS | Performed by: INTERNAL MEDICINE

## 2018-12-11 PROCEDURE — 99214 OFFICE O/P EST MOD 30 MIN: CPT | Performed by: INTERNAL MEDICINE

## 2018-12-11 PROCEDURE — 3017F COLORECTAL CA SCREEN DOC REV: CPT | Performed by: INTERNAL MEDICINE

## 2018-12-11 RX ORDER — BUDESONIDE AND FORMOTEROL FUMARATE DIHYDRATE 160; 4.5 UG/1; UG/1
2 AEROSOL RESPIRATORY (INHALATION) 2 TIMES DAILY
Qty: 1 INHALER | Refills: 6 | Status: SHIPPED | OUTPATIENT
Start: 2018-12-11 | End: 2019-06-10 | Stop reason: ALTCHOICE

## 2018-12-11 NOTE — PROGRESS NOTES
Chief complaint  This is a 60y.o. year old female  who comes to see me with a chief complaint of   Chief Complaint   Patient presents with    6 Month Follow-Up     COPD       HPI  Here with a cc of COPD. She is doing ok. Using symbicort bid, and spiriva qd. Not using albuterol too much at all. Does not feel her breathing has worsened. Feeling relatively stable. Admits to smoking 1 ppd. Has decreased her smoking from 3-4 ppds to 1 ppd. Not because she wants to quit but because she is living with her daughter and she cannot smoke in the house. She previously has told me she will continue to smoke and does not care about side effects etc.  She still feels that way. She does not want to quit. Past Medical History:   Diagnosis Date    Abnormal screening mammogram 7/31/2017    Dx right pending    Anxiety     Arthritis     Asthma     Colon polyps 10/30/2017    Multiple path pending 10.2017  Recheck 10.2020    Depression     Multinodular thyroid 7/25/2017 7.2017 euthyroid recheck 1 year       Past Surgical History:   Procedure Laterality Date    TONSILLECTOMY      TUBAL LIGATION         Social History     Social History    Marital status:      Spouse name: N/A    Number of children: N/A    Years of education: N/A     Occupational History    Not on file.      Social History Main Topics    Smoking status: Current Every Day Smoker     Packs/day: 1.50     Years: 50.00     Types: Cigarettes    Smokeless tobacco: Never Used      Comment: 3-4 packs per day     Alcohol use No    Drug use: No    Sexual activity: Yes     Partners: Male     Other Topics Concern    Not on file     Social History Narrative    No narrative on file       Family History   Problem Relation Age of Onset    COPD Father     Breast Cancer Sister          Current Outpatient Prescriptions:     budesonide-formoterol (SYMBICORT) 160-4.5 MCG/ACT AERO, Inhale 2 puffs into the lungs 2 times daily, Disp: 1 Inhaler, Rfl: 6    buPROPion (WELLBUTRIN XL) 150 MG extended release tablet, TAKE 1 TABLET BY MOUTH TWICE DAILY, Disp: 60 tablet, Rfl: 0    SYMBICORT 160-4.5 MCG/ACT AERO, INHALE 2 PUFFS INTO THE LUNGS TWICE DAILY, Disp: 10.2 g, Rfl: 0    tiotropium (SPIRIVA RESPIMAT) 2.5 MCG/ACT AERS inhaler, Inhale 2 puffs into the lungs daily 2 puffs once daily, Disp: 1 Inhaler, Rfl: 11    albuterol sulfate HFA (VENTOLIN HFA) 108 (90 Base) MCG/ACT inhaler, Inhale 2 puffs into the lungs every 6 hours as needed for Wheezing, Disp: 1 Inhaler, Rfl: 3    nortriptyline (PAMELOR) 10 MG capsule, 3 to 4 pills prior to sleep, Disp: 120 capsule, Rfl: 0    atorvastatin (LIPITOR) 40 MG tablet, 1 po q day follow up on medication fasting in 2 months, Disp: 90 tablet, Rfl: 3      ROS:  GENERAL:  No fevers, chills  HEENT:  No double vision, blurry vision, or difficulty swallowing  HEART:  No chest pain, no palpitations  LUNGS: Rarely SOB, no recent illness, not using albuterol too much  ABDOMEN:  No abdominal pains, no changes in stools  GENITOURINARY:  No increased frequency, no blood in urine  EXTREMITIES:  No swelling, no lesions  NEURO:  No numbness or tingling, no seizures  SKIN:  No new rashes, no changes in hair or nails  LYMPH:  No masses, no swelling neck, armpits, or groin    PHYSICAL EXAM:  Vitals:    12/11/18 0739   BP: 120/84   Pulse: 90   Resp: 16   Temp: 99.5 °F (37.5 °C)   SpO2: 98%       GENERAL:  Thin, looks older than listed age, smells of tobacco   HEENT:  No scleral icterus, no conjunctival irritation  NECK:  No thyromegaly, no bruits  LYMPH:  No cervical or supraclavicular adenopathy  HEART:  Tachycardic, regular, no murmurs  LUNGS:  Clear and diminished  ABDOMEN:  No distention, no organomegaly  EXTREMITIES:  No edema, no digital clubbing  NEURO:  No localizing deficits, CN II-XII intact    Pulmonary Function Testing  3/2018  My interpretation is severe obstruction with hyperinflation and reduced diffusing capacity.

## 2018-12-12 RX ORDER — FLUTICASONE FUROATE AND VILANTEROL 200; 25 UG/1; UG/1
1 POWDER RESPIRATORY (INHALATION) DAILY
Qty: 1 EACH | Refills: 11 | Status: SHIPPED | OUTPATIENT
Start: 2018-12-12 | End: 2020-12-16 | Stop reason: SDUPTHER

## 2018-12-12 NOTE — TELEPHONE ENCOUNTER
Called Daisha Chappell and instructed her to take the [facility] in place of the Symbicort

## 2018-12-17 ENCOUNTER — OFFICE VISIT (OUTPATIENT)
Dept: FAMILY MEDICINE CLINIC | Age: 58
End: 2018-12-17
Payer: COMMERCIAL

## 2018-12-17 VITALS
HEART RATE: 114 BPM | SYSTOLIC BLOOD PRESSURE: 110 MMHG | TEMPERATURE: 98.7 F | BODY MASS INDEX: 20.32 KG/M2 | DIASTOLIC BLOOD PRESSURE: 64 MMHG | HEIGHT: 64 IN | OXYGEN SATURATION: 98 % | WEIGHT: 119 LBS | RESPIRATION RATE: 18 BRPM

## 2018-12-17 DIAGNOSIS — J43.1 PANLOBULAR EMPHYSEMA (HCC): ICD-10-CM

## 2018-12-17 DIAGNOSIS — R05.9 COUGH: Primary | ICD-10-CM

## 2018-12-17 DIAGNOSIS — Z13.31 POSITIVE DEPRESSION SCREENING: ICD-10-CM

## 2018-12-17 DIAGNOSIS — F17.200 TOBACCO DEPENDENCE: ICD-10-CM

## 2018-12-17 PROCEDURE — G8926 SPIRO NO PERF OR DOC: HCPCS | Performed by: INTERNAL MEDICINE

## 2018-12-17 PROCEDURE — G8420 CALC BMI NORM PARAMETERS: HCPCS | Performed by: INTERNAL MEDICINE

## 2018-12-17 PROCEDURE — G8427 DOCREV CUR MEDS BY ELIG CLIN: HCPCS | Performed by: INTERNAL MEDICINE

## 2018-12-17 PROCEDURE — 3017F COLORECTAL CA SCREEN DOC REV: CPT | Performed by: INTERNAL MEDICINE

## 2018-12-17 PROCEDURE — G8431 POS CLIN DEPRES SCRN F/U DOC: HCPCS | Performed by: INTERNAL MEDICINE

## 2018-12-17 PROCEDURE — 99213 OFFICE O/P EST LOW 20 MIN: CPT | Performed by: INTERNAL MEDICINE

## 2018-12-17 PROCEDURE — 3023F SPIROM DOC REV: CPT | Performed by: INTERNAL MEDICINE

## 2018-12-17 PROCEDURE — 4004F PT TOBACCO SCREEN RCVD TLK: CPT | Performed by: INTERNAL MEDICINE

## 2018-12-17 PROCEDURE — G8482 FLU IMMUNIZE ORDER/ADMIN: HCPCS | Performed by: INTERNAL MEDICINE

## 2018-12-17 RX ORDER — GUAIFENESIN AND CODEINE PHOSPHATE 100; 10 MG/5ML; MG/5ML
10 SOLUTION ORAL 3 TIMES DAILY PRN
Qty: 210 ML | Refills: 0 | Status: SHIPPED | OUTPATIENT
Start: 2018-12-17 | End: 2018-12-20

## 2018-12-17 RX ORDER — AZITHROMYCIN 250 MG/1
TABLET, FILM COATED ORAL
Qty: 6 TABLET | Refills: 0 | Status: SHIPPED | OUTPATIENT
Start: 2018-12-17 | End: 2019-03-25

## 2018-12-17 RX ORDER — BUPROPION HYDROCHLORIDE 150 MG/1
TABLET ORAL
Qty: 60 TABLET | Refills: 5 | Status: SHIPPED | OUTPATIENT
Start: 2018-12-17 | End: 2019-09-11

## 2019-01-03 ENCOUNTER — OFFICE VISIT (OUTPATIENT)
Dept: PSYCHOLOGY | Age: 59
End: 2019-01-03
Payer: COMMERCIAL

## 2019-01-03 DIAGNOSIS — F32.9 REACTIVE DEPRESSION: ICD-10-CM

## 2019-01-03 DIAGNOSIS — F41.1 GENERALIZED ANXIETY DISORDER: Primary | ICD-10-CM

## 2019-01-03 PROCEDURE — 90832 PSYTX W PT 30 MINUTES: CPT | Performed by: PSYCHOLOGIST

## 2019-01-03 ASSESSMENT — ANXIETY QUESTIONNAIRES
GAD7 TOTAL SCORE: 4
7. FEELING AFRAID AS IF SOMETHING AWFUL MIGHT HAPPEN: 1-SEVERAL DAYS
3. WORRYING TOO MUCH ABOUT DIFFERENT THINGS: 1-SEVERAL DAYS
1. FEELING NERVOUS, ANXIOUS, OR ON EDGE: 0-NOT AT ALL SURE
6. BECOMING EASILY ANNOYED OR IRRITABLE: 0-NOT AT ALL SURE
4. TROUBLE RELAXING: 1-SEVERAL DAYS
5. BEING SO RESTLESS THAT IT IS HARD TO SIT STILL: 0-NOT AT ALL SURE
2. NOT BEING ABLE TO STOP OR CONTROL WORRYING: 1-SEVERAL DAYS

## 2019-01-03 ASSESSMENT — PATIENT HEALTH QUESTIONNAIRE - PHQ9
2. FEELING DOWN, DEPRESSED OR HOPELESS: 1
SUM OF ALL RESPONSES TO PHQ QUESTIONS 1-9: 2
SUM OF ALL RESPONSES TO PHQ9 QUESTIONS 1 & 2: 2
SUM OF ALL RESPONSES TO PHQ QUESTIONS 1-9: 2
1. LITTLE INTEREST OR PLEASURE IN DOING THINGS: 1

## 2019-01-14 RX ORDER — ATORVASTATIN CALCIUM 40 MG/1
TABLET, FILM COATED ORAL
Qty: 90 TABLET | Refills: 0 | Status: SHIPPED | OUTPATIENT
Start: 2019-01-14 | End: 2019-04-15 | Stop reason: SDUPTHER

## 2019-02-06 ENCOUNTER — OFFICE VISIT (OUTPATIENT)
Dept: PSYCHOLOGY | Age: 59
End: 2019-02-06
Payer: COMMERCIAL

## 2019-02-06 DIAGNOSIS — F41.1 GENERALIZED ANXIETY DISORDER: Primary | ICD-10-CM

## 2019-02-06 DIAGNOSIS — F32.9 REACTIVE DEPRESSION: ICD-10-CM

## 2019-02-06 PROCEDURE — 90832 PSYTX W PT 30 MINUTES: CPT | Performed by: PSYCHOLOGIST

## 2019-02-06 ASSESSMENT — PATIENT HEALTH QUESTIONNAIRE - PHQ9
1. LITTLE INTEREST OR PLEASURE IN DOING THINGS: 1
SUM OF ALL RESPONSES TO PHQ QUESTIONS 1-9: 2
SUM OF ALL RESPONSES TO PHQ9 QUESTIONS 1 & 2: 2
2. FEELING DOWN, DEPRESSED OR HOPELESS: 1
SUM OF ALL RESPONSES TO PHQ QUESTIONS 1-9: 2

## 2019-02-06 ASSESSMENT — ANXIETY QUESTIONNAIRES
5. BEING SO RESTLESS THAT IT IS HARD TO SIT STILL: 0-NOT AT ALL SURE
3. WORRYING TOO MUCH ABOUT DIFFERENT THINGS: 1-SEVERAL DAYS
4. TROUBLE RELAXING: 0-NOT AT ALL SURE
7. FEELING AFRAID AS IF SOMETHING AWFUL MIGHT HAPPEN: 0-NOT AT ALL SURE
1. FEELING NERVOUS, ANXIOUS, OR ON EDGE: 1-SEVERAL DAYS
GAD7 TOTAL SCORE: 3
6. BECOMING EASILY ANNOYED OR IRRITABLE: 0-NOT AT ALL SURE
2. NOT BEING ABLE TO STOP OR CONTROL WORRYING: 1-SEVERAL DAYS

## 2019-02-19 ENCOUNTER — HOSPITAL ENCOUNTER (OUTPATIENT)
Dept: CT IMAGING | Age: 59
Discharge: HOME | End: 2019-02-19
Payer: COMMERCIAL

## 2019-02-19 DIAGNOSIS — Z87.891 PERSONAL HISTORY OF TOBACCO USE: ICD-10-CM

## 2019-02-19 PROCEDURE — G0297 LDCT FOR LUNG CA SCREEN: HCPCS

## 2019-02-26 ENCOUNTER — TELEPHONE (OUTPATIENT)
Dept: CASE MANAGEMENT | Age: 59
End: 2019-02-26

## 2019-03-06 ENCOUNTER — OFFICE VISIT (OUTPATIENT)
Dept: PSYCHOLOGY | Age: 59
End: 2019-03-06
Payer: COMMERCIAL

## 2019-03-06 DIAGNOSIS — F41.1 GENERALIZED ANXIETY DISORDER: Primary | ICD-10-CM

## 2019-03-06 DIAGNOSIS — F32.9 REACTIVE DEPRESSION: ICD-10-CM

## 2019-03-06 PROCEDURE — 90832 PSYTX W PT 30 MINUTES: CPT | Performed by: PSYCHOLOGIST

## 2019-03-06 ASSESSMENT — ANXIETY QUESTIONNAIRES
5. BEING SO RESTLESS THAT IT IS HARD TO SIT STILL: 0-NOT AT ALL SURE
3. WORRYING TOO MUCH ABOUT DIFFERENT THINGS: 1-SEVERAL DAYS
7. FEELING AFRAID AS IF SOMETHING AWFUL MIGHT HAPPEN: 1-SEVERAL DAYS
6. BECOMING EASILY ANNOYED OR IRRITABLE: 0-NOT AT ALL SURE
1. FEELING NERVOUS, ANXIOUS, OR ON EDGE: 0-NOT AT ALL SURE
2. NOT BEING ABLE TO STOP OR CONTROL WORRYING: 1-SEVERAL DAYS
GAD7 TOTAL SCORE: 3
4. TROUBLE RELAXING: 0-NOT AT ALL SURE

## 2019-03-06 ASSESSMENT — PATIENT HEALTH QUESTIONNAIRE - PHQ9
2. FEELING DOWN, DEPRESSED OR HOPELESS: 1
SUM OF ALL RESPONSES TO PHQ QUESTIONS 1-9: 1
SUM OF ALL RESPONSES TO PHQ9 QUESTIONS 1 & 2: 1
SUM OF ALL RESPONSES TO PHQ QUESTIONS 1-9: 1
1. LITTLE INTEREST OR PLEASURE IN DOING THINGS: 0

## 2019-03-25 ENCOUNTER — OFFICE VISIT (OUTPATIENT)
Dept: FAMILY MEDICINE CLINIC | Age: 59
End: 2019-03-25
Payer: COMMERCIAL

## 2019-03-25 VITALS
HEIGHT: 64 IN | DIASTOLIC BLOOD PRESSURE: 80 MMHG | OXYGEN SATURATION: 98 % | SYSTOLIC BLOOD PRESSURE: 131 MMHG | HEART RATE: 90 BPM | RESPIRATION RATE: 12 BRPM | WEIGHT: 115 LBS | BODY MASS INDEX: 19.63 KG/M2

## 2019-03-25 DIAGNOSIS — E04.2 MULTINODULAR THYROID: ICD-10-CM

## 2019-03-25 DIAGNOSIS — Z86.010 HX OF ADENOMATOUS POLYP OF COLON: ICD-10-CM

## 2019-03-25 DIAGNOSIS — Z80.3 FH: BREAST CANCER IN FIRST DEGREE RELATIVE: ICD-10-CM

## 2019-03-25 DIAGNOSIS — J02.9 SORE THROAT: Primary | ICD-10-CM

## 2019-03-25 DIAGNOSIS — E78.01 FAMILIAL HYPERCHOLESTEROLEMIA: ICD-10-CM

## 2019-03-25 DIAGNOSIS — J43.1 PANLOBULAR EMPHYSEMA (HCC): ICD-10-CM

## 2019-03-25 DIAGNOSIS — F17.200 TOBACCO DEPENDENCE: ICD-10-CM

## 2019-03-25 PROCEDURE — G8926 SPIRO NO PERF OR DOC: HCPCS | Performed by: INTERNAL MEDICINE

## 2019-03-25 PROCEDURE — 4004F PT TOBACCO SCREEN RCVD TLK: CPT | Performed by: INTERNAL MEDICINE

## 2019-03-25 PROCEDURE — G8420 CALC BMI NORM PARAMETERS: HCPCS | Performed by: INTERNAL MEDICINE

## 2019-03-25 PROCEDURE — G8427 DOCREV CUR MEDS BY ELIG CLIN: HCPCS | Performed by: INTERNAL MEDICINE

## 2019-03-25 PROCEDURE — 3017F COLORECTAL CA SCREEN DOC REV: CPT | Performed by: INTERNAL MEDICINE

## 2019-03-25 PROCEDURE — 3023F SPIROM DOC REV: CPT | Performed by: INTERNAL MEDICINE

## 2019-03-25 PROCEDURE — G8482 FLU IMMUNIZE ORDER/ADMIN: HCPCS | Performed by: INTERNAL MEDICINE

## 2019-03-25 PROCEDURE — 99213 OFFICE O/P EST LOW 20 MIN: CPT | Performed by: INTERNAL MEDICINE

## 2019-03-25 RX ORDER — AZITHROMYCIN 250 MG/1
250 TABLET, FILM COATED ORAL SEE ADMIN INSTRUCTIONS
Qty: 6 TABLET | Refills: 0 | Status: SHIPPED | OUTPATIENT
Start: 2019-03-25 | End: 2019-03-30

## 2019-04-03 ENCOUNTER — OFFICE VISIT (OUTPATIENT)
Dept: PSYCHOLOGY | Age: 59
End: 2019-04-03
Payer: COMMERCIAL

## 2019-04-03 DIAGNOSIS — F33.0 MDD (MAJOR DEPRESSIVE DISORDER), RECURRENT EPISODE, MILD (HCC): ICD-10-CM

## 2019-04-03 DIAGNOSIS — F41.1 GENERALIZED ANXIETY DISORDER: Primary | ICD-10-CM

## 2019-04-03 DIAGNOSIS — F32.9 REACTIVE DEPRESSION: ICD-10-CM

## 2019-04-03 PROCEDURE — 90832 PSYTX W PT 30 MINUTES: CPT | Performed by: PSYCHOLOGIST

## 2019-04-03 ASSESSMENT — ANXIETY QUESTIONNAIRES
GAD7 TOTAL SCORE: 2
6. BECOMING EASILY ANNOYED OR IRRITABLE: 0-NOT AT ALL SURE
7. FEELING AFRAID AS IF SOMETHING AWFUL MIGHT HAPPEN: 0-NOT AT ALL SURE
1. FEELING NERVOUS, ANXIOUS, OR ON EDGE: 0-NOT AT ALL SURE
5. BEING SO RESTLESS THAT IT IS HARD TO SIT STILL: 0-NOT AT ALL SURE
3. WORRYING TOO MUCH ABOUT DIFFERENT THINGS: 1-SEVERAL DAYS
2. NOT BEING ABLE TO STOP OR CONTROL WORRYING: 1-SEVERAL DAYS
4. TROUBLE RELAXING: 0-NOT AT ALL SURE

## 2019-04-03 ASSESSMENT — PATIENT HEALTH QUESTIONNAIRE - PHQ9
SUM OF ALL RESPONSES TO PHQ9 QUESTIONS 1 & 2: 0
1. LITTLE INTEREST OR PLEASURE IN DOING THINGS: 0
SUM OF ALL RESPONSES TO PHQ QUESTIONS 1-9: 0
SUM OF ALL RESPONSES TO PHQ QUESTIONS 1-9: 0
2. FEELING DOWN, DEPRESSED OR HOPELESS: 0

## 2019-04-03 NOTE — PROGRESS NOTES
Behavioral Health Consultation Follow-up  Mile Azul PsyD  Psychologist  4/3/2019  8:29 AM      Time spent with Patient: 22 minutes  This is patient's sixth  [facility] appointment. Reason for Consult:  SOCORRO, Reactive depression   Referring Provider: Ene Magaña MD  [address]    Feedback given to PCP. S:    Last appt: 3/6. See A: below. O:  MSE:    Appearance    alert, cooperative  Appetite normal  Sleep disturbance No  Fatigue No  Loss of pleasure No  Impulsive behavior No  Speech    normal rate, normal volume and well articulated  Mood  Stable, managing  Affect    normal affect  Thought Content    intact  Thought Process    linear, goal directed and coherent  Associations    logical connections  Insight    Good  Judgment    Intact  Orientation    oriented to person, place, time, and general circumstances  Memory    recent and remote memory intact  Attention/Concentration    intact  Morbid ideation No  Suicide Assessment    no suicidal ideation      History:    Medications:   Current Outpatient Medications   Medication Sig Dispense Refill    lidocaine viscous (XYLOCAINE) 2 % solution 5- 10 ml QID swish and spit prn sore throat 200 mL 1    atorvastatin (LIPITOR) 40 MG tablet TAKE 1 TABLET BY MOUTH EVERY DAY.  FOLLOW UP ON MEDICATION FASTING IN 2 MONTHS 90 tablet 0    buPROPion (WELLBUTRIN XL) 150 MG extended release tablet TAKE 1 TABLET BY MOUTH TWICE DAILY 60 tablet 5    Fluticasone Furoate-Vilanterol (BREO ELLIPTA) 200-25 MCG/INH AEPB Inhale 1 puff into the lungs daily One puff daily 1 each 11    budesonide-formoterol (SYMBICORT) 160-4.5 MCG/ACT AERO Inhale 2 puffs into the lungs 2 times daily 1 Inhaler 6    SYMBICORT 160-4.5 MCG/ACT AERO INHALE 2 PUFFS INTO THE LUNGS TWICE DAILY 10.2 g 0    tiotropium (SPIRIVA RESPIMAT) 2.5 MCG/ACT AERS inhaler Inhale 2 puffs into the lungs daily 2 puffs once daily 1 Inhaler 11    albuterol sulfate HFA (VENTOLIN HFA) COPD Father     Breast Cancer Sister        A:    Been doing well overall until this past weekend it was \"scary\" when mother passed out in the bathroom, pt had to call the squad. Adult son, Samy Manzano is going to have twins in September, but pt not sure they are his, \"I won't believe this until they are in my hands\". Pt expressing a lot of confidence today in managing mood and looking forward to warmer weather. Loves to garden which really helps her mood. May try to do some of that today now that the weather is clear. Agreed she is ready to move appt out to 6 weeks. PHQ Scores 4/3/2019 3/6/2019 2/6/2019 1/3/2019 12/5/2018 11/7/2018 10/3/2018   PHQ2 Score 0 1 2 2 2 2 2   PHQ9 Score 0 1 2 2 2 2 2     Interpretation of Total Score Depression Severity: 1-4 = Minimal depression, 5-9 = Mild depression, 10-14 = Moderate depression, 15-19 = Moderately severe depression, 20-27 = Severe depression    SOCORRO 7 SCORE 4/3/2019 3/6/2019 2/6/2019 1/3/2019 12/5/2018 11/7/2018 10/3/2018   SOCORRO-7 Total Score 2 3 3 4 4 3 10     Interpretation of SOCORRO-7 score: 5-9 = mild anxiety, 10-14 = moderate anxiety, 15+ = severe anxiety. Recommend referral to behavioral health for scores 10 or greater. Denied any si/hi risk.      Diagnosis:    SOCORRO, reactive depression       Diagnosis Date    Abnormal screening mammogram 7/31/2017    Dx right pending    Anxiety     Arthritis     Asthma     Colon polyps 10/30/2017    Multiple path pending 10.2017  Recheck 10.2020    Depression     Multinodular thyroid 7/25/2017 7.2017 euthyroid recheck 1 year     Problems with primary support group    Plan:  Pt interventions:    Practiced assertive communication, Trained in strategies for increasing balanced thinking, Discussed self-care (sleep, nutrition, rewarding activities, social support, exercise) and Supportive techniques    Pt Behavioral Change Plan:    1. Continue to work with daughter to help with looking into volunteer services  2.  Continue to take medications as prescribed, go to Dr Liliana Jackson as needed  3. Continue to talk with siblings about making \"amends\", meet with other 2 siblings  3. Continue to do some work for son to keep busy for stress reduction  5.  Return to clinic for Dr Juanis Navas in 6 weeks

## 2019-04-15 DIAGNOSIS — E78.5 HYPERLIPIDEMIA, UNSPECIFIED HYPERLIPIDEMIA TYPE: Primary | ICD-10-CM

## 2019-04-15 RX ORDER — ATORVASTATIN CALCIUM 40 MG/1
TABLET, FILM COATED ORAL
Qty: 90 TABLET | Refills: 0 | Status: SHIPPED | OUTPATIENT
Start: 2019-04-15 | End: 2019-08-01 | Stop reason: SDUPTHER

## 2019-05-03 DIAGNOSIS — J44.9 COPD, SEVERE (HCC): ICD-10-CM

## 2019-05-03 RX ORDER — TIOTROPIUM BROMIDE INHALATION SPRAY 3.12 UG/1
SPRAY, METERED RESPIRATORY (INHALATION)
Qty: 4 G | Refills: 2 | Status: SHIPPED | OUTPATIENT
Start: 2019-05-03 | End: 2019-08-21 | Stop reason: SDUPTHER

## 2019-05-15 ENCOUNTER — OFFICE VISIT (OUTPATIENT)
Dept: PSYCHOLOGY | Age: 59
End: 2019-05-15
Payer: COMMERCIAL

## 2019-05-15 DIAGNOSIS — F41.1 GENERALIZED ANXIETY DISORDER: Primary | ICD-10-CM

## 2019-05-15 DIAGNOSIS — F32.9 REACTIVE DEPRESSION: ICD-10-CM

## 2019-05-15 PROCEDURE — 90832 PSYTX W PT 30 MINUTES: CPT | Performed by: PSYCHOLOGIST

## 2019-05-15 ASSESSMENT — PATIENT HEALTH QUESTIONNAIRE - PHQ9
SUM OF ALL RESPONSES TO PHQ9 QUESTIONS 1 & 2: 1
2. FEELING DOWN, DEPRESSED OR HOPELESS: 1
SUM OF ALL RESPONSES TO PHQ QUESTIONS 1-9: 1
1. LITTLE INTEREST OR PLEASURE IN DOING THINGS: 0
SUM OF ALL RESPONSES TO PHQ QUESTIONS 1-9: 1

## 2019-05-15 ASSESSMENT — ANXIETY QUESTIONNAIRES
4. TROUBLE RELAXING: 0-NOT AT ALL SURE
6. BECOMING EASILY ANNOYED OR IRRITABLE: 0-NOT AT ALL SURE
1. FEELING NERVOUS, ANXIOUS, OR ON EDGE: 0-NOT AT ALL SURE
7. FEELING AFRAID AS IF SOMETHING AWFUL MIGHT HAPPEN: 0-NOT AT ALL SURE
GAD7 TOTAL SCORE: 2
5. BEING SO RESTLESS THAT IT IS HARD TO SIT STILL: 0-NOT AT ALL SURE
3. WORRYING TOO MUCH ABOUT DIFFERENT THINGS: 1-SEVERAL DAYS
2. NOT BEING ABLE TO STOP OR CONTROL WORRYING: 1-SEVERAL DAYS

## 2019-05-15 NOTE — PROGRESS NOTES
Behavioral Health Consultation Follow-up  Enzo Petty PsyD  Psychologist  5/15/2019  8:32 AM      Time spent with Patient: 30 minutes  This is patient's seventh  [facility] appointment. Reason for Consult:  SOCORRO, Reactive depression   Referring Provider: Ayanna Hdz MD  [address]    Feedback given to PCP. S:    Last appt: 4/3. Stressor: older brother and pt (who provide care for mother) have been thinking about placing mother in a NH but youngest sister continues to fight this even though mother's health worsening. Going to keep mother 1 more weekend this month then putting boundary down with youngest sister of no more overnights but will spend days with her. This is big step for pt in that she has been struggling with boundaries in regards to this issue. Met with rest of the family for halfway party, older brother Lizabeth Garcia retired, pt took her mother. Overall it went okay. Siblings were friendly and asked how she and her daughter was doing. Feels this is a step in the right direction in terms of rebuilding relationships with siblings.      O:  MSE:    Appearance    alert, cooperative  Appetite normal  Sleep disturbance No  Fatigue No  Loss of pleasure No  Impulsive behavior No  Speech    normal rate, normal volume and well articulated  Mood    Stable, depression and anxiety levels well controlled  Affect    normal affect  Thought Content    intact  Thought Process    linear, goal directed and coherent  Associations    logical connections  Insight    Good  Judgment    Intact  Orientation    oriented to person, place, time, and general circumstances  Memory    recent and remote memory intact  Attention/Concentration    intact  Morbid ideation No  Suicide Assessment    no suicidal ideation      History:    Medications:   Current Outpatient Medications   Medication Sig Dispense Refill    SPIRIVA RESPIMAT 2.5 MCG/ACT AERS inhaler INHALE 2 PUFFS INTO THE LUNGS EVERY DAY 4 g 2    atorvastatin (LIPITOR) 40 MG tablet TAKE 1 TABLET BY MOUTH EVERY DAY. FOLLOW UP ON MEDICATION FASTING IN 2 MONTHS 90 tablet 0    lidocaine viscous (XYLOCAINE) 2 % solution 5- 10 ml QID swish and spit prn sore throat 200 mL 1    buPROPion (WELLBUTRIN XL) 150 MG extended release tablet TAKE 1 TABLET BY MOUTH TWICE DAILY 60 tablet 5    Fluticasone Furoate-Vilanterol (BREO ELLIPTA) 200-25 MCG/INH AEPB Inhale 1 puff into the lungs daily One puff daily 1 each 11    budesonide-formoterol (SYMBICORT) 160-4.5 MCG/ACT AERO Inhale 2 puffs into the lungs 2 times daily 1 Inhaler 6    SYMBICORT 160-4.5 MCG/ACT AERO INHALE 2 PUFFS INTO THE LUNGS TWICE DAILY 10.2 g 0    albuterol sulfate HFA (VENTOLIN HFA) 108 (90 Base) MCG/ACT inhaler Inhale 2 puffs into the lungs every 6 hours as needed for Wheezing 1 Inhaler 3     No current facility-administered medications for this visit. Social History:   Social History     Socioeconomic History    Marital status:       Spouse name: Not on file    Number of children: Not on file    Years of education: Not on file    Highest education level: Not on file   Occupational History    Not on file   Social Needs    Financial resource strain: Not on file    Food insecurity:     Worry: Not on file     Inability: Not on file    Transportation needs:     Medical: Not on file     Non-medical: Not on file   Tobacco Use    Smoking status: Current Every Day Smoker     Packs/day: 1.50     Years: 50.00     Pack years: 75.00     Types: Cigarettes    Smokeless tobacco: Never Used    Tobacco comment: 3-4 packs per day    Substance and Sexual Activity    Alcohol use: No    Drug use: No    Sexual activity: Yes     Partners: Male   Lifestyle    Physical activity:     Days per week: Not on file     Minutes per session: Not on file    Stress: Not on file   Relationships    Social connections:     Talks on phone: Not on file     Gets together: Not on file     Attends Taoist service: Not on file     Active member of club or organization: Not on file     Attends meetings of clubs or organizations: Not on file     Relationship status: Not on file    Intimate partner violence:     Fear of current or ex partner: Not on file     Emotionally abused: Not on file     Physically abused: Not on file     Forced sexual activity: Not on file   Other Topics Concern    Not on file   Social History Narrative    Not on file       TOBACCO:   reports that she has been smoking cigarettes. She has a 75.00 pack-year smoking history. She has never used smokeless tobacco.  ETOH:   reports that she does not drink alcohol. Family History:   Family History   Problem Relation Age of Onset    COPD Father     Breast Cancer Sister      A:    See S: above. Overall pt doing well in her grief recovery process. She is doing an amazing job with maintaining healthy boundaries with youngest sister in regards to care of their mother. Continues to express confidence in managing mood and anxiety levels consistently. We are going to keep to the 6 week f/u approach for now. If the next 2-3 consults her mood continues to be consistently stable, we will shift to 2-3 month follows then as needed. She expressed understanding of this plan. PHQ Scores 5/15/2019 4/3/2019 3/6/2019 2/6/2019 1/3/2019 12/5/2018 11/7/2018   PHQ2 Score 1 0 1 2 2 2 2   PHQ9 Score 1 0 1 2 2 2 2     Interpretation of Total Score Depression Severity: 1-4 = Minimal depression, 5-9 = Mild depression, 10-14 = Moderate depression, 15-19 = Moderately severe depression, 20-27 = Severe depression    SOCORRO 7 SCORE 5/15/2019 4/3/2019 3/6/2019 2/6/2019 1/3/2019 12/5/2018 11/7/2018   SOCORRO-7 Total Score 2 2 3 3 4 4 3     Interpretation of SOCORRO-7 score: 5-9 = mild anxiety, 10-14 = moderate anxiety, 15+ = severe anxiety. Recommend referral to behavioral health for scores 10 or greater. Denied any si/hi risk.      Diagnosis:    SOCORRO, Reactive depression

## 2019-05-15 NOTE — PATIENT INSTRUCTIONS
1. Continue to maintain boundaries with younger sister, Trupti Lung in regards to care of your mother  2. Continue to think about steps towards placing mother in NH, work with older brother on this  1. Continue to take medications as prescribed, go to Dr Heena Freitas as needed  4. Continue to connect with family members any amount  5. Continue to do some work for son to keep busy for stress reduction  6.  Return to clinic for Dr Crescencio Mendez in 6 weeks

## 2019-06-10 ENCOUNTER — OFFICE VISIT (OUTPATIENT)
Dept: PULMONOLOGY | Age: 59
End: 2019-06-10
Payer: COMMERCIAL

## 2019-06-10 VITALS
BODY MASS INDEX: 19.63 KG/M2 | SYSTOLIC BLOOD PRESSURE: 136 MMHG | HEIGHT: 64 IN | TEMPERATURE: 98 F | HEART RATE: 80 BPM | OXYGEN SATURATION: 97 % | WEIGHT: 115 LBS | RESPIRATION RATE: 16 BRPM | DIASTOLIC BLOOD PRESSURE: 80 MMHG

## 2019-06-10 DIAGNOSIS — R91.1 LUNG NODULE: ICD-10-CM

## 2019-06-10 DIAGNOSIS — Z72.0 TOBACCO ABUSE: ICD-10-CM

## 2019-06-10 DIAGNOSIS — J43.2 CENTRILOBULAR EMPHYSEMA (HCC): ICD-10-CM

## 2019-06-10 DIAGNOSIS — J44.9 COPD, SEVERE (HCC): Primary | ICD-10-CM

## 2019-06-10 PROCEDURE — 3017F COLORECTAL CA SCREEN DOC REV: CPT | Performed by: INTERNAL MEDICINE

## 2019-06-10 PROCEDURE — G8420 CALC BMI NORM PARAMETERS: HCPCS | Performed by: INTERNAL MEDICINE

## 2019-06-10 PROCEDURE — G8427 DOCREV CUR MEDS BY ELIG CLIN: HCPCS | Performed by: INTERNAL MEDICINE

## 2019-06-10 PROCEDURE — G8926 SPIRO NO PERF OR DOC: HCPCS | Performed by: INTERNAL MEDICINE

## 2019-06-10 PROCEDURE — 99214 OFFICE O/P EST MOD 30 MIN: CPT | Performed by: INTERNAL MEDICINE

## 2019-06-10 PROCEDURE — 4004F PT TOBACCO SCREEN RCVD TLK: CPT | Performed by: INTERNAL MEDICINE

## 2019-06-10 PROCEDURE — 3023F SPIROM DOC REV: CPT | Performed by: INTERNAL MEDICINE

## 2019-06-10 NOTE — PATIENT INSTRUCTIONS
Continue with inhalers    Use albuterol as needed as needed.   Use prior to exertion    You will need to stop smoking one day    Follow up in 6 months

## 2019-06-10 NOTE — PROGRESS NOTES
Chief complaint  This is a 60y.o. year old female  who comes to see me with a chief complaint of   Chief Complaint   Patient presents with    COPD     f/u COPD       HPI  Here with a cc of COPD. Says she is ok. Does not go out of the house much. Now on Breo (in place of symbicort) and [place]. Does not use albuterol much if at all but has daily SOB. She is still smoking. Has no plans to quit and does not want to quit. Previously was smoking about 3 ppd but moved into her daughter's house and was not allowed to smoke. She then had to cut down, now that it is warmer she is probably smoking more. Still likes it and sees no reason to quit now. Has not been sick nor hospitalized        Past Medical History:   Diagnosis Date    Abnormal screening mammogram 7/31/2017    Dx right pending    Anxiety     Arthritis     Asthma     Colon polyps 10/30/2017    Multiple path pending 10.2017  Recheck 10.2020    Depression     Multinodular thyroid 7/25/2017 7.2017 euthyroid recheck 1 year       Past Surgical History:   Procedure Laterality Date    TONSILLECTOMY      TUBAL LIGATION         Social History     Socioeconomic History    Marital status:       Spouse name: Not on file    Number of children: Not on file    Years of education: Not on file    Highest education level: Not on file   Occupational History    Not on file   Social Needs    Financial resource strain: Not on file    Food insecurity:     Worry: Not on file     Inability: Not on file    Transportation needs:     Medical: Not on file     Non-medical: Not on file   Tobacco Use    Smoking status: Current Every Day Smoker     Packs/day: 1.50     Years: 50.00     Pack years: 75.00     Types: Cigarettes    Smokeless tobacco: Never Used   Substance and Sexual Activity    Alcohol use: No    Drug use: No    Sexual activity: Yes     Partners: Male   Lifestyle    Physical activity:     Days per week: Not on file     Minutes per session: Not on file    Stress: Not on file   Relationships    Social connections:     Talks on phone: Not on file     Gets together: Not on file     Attends Faith service: Not on file     Active member of club or organization: Not on file     Attends meetings of clubs or organizations: Not on file     Relationship status: Not on file    Intimate partner violence:     Fear of current or ex partner: Not on file     Emotionally abused: Not on file     Physically abused: Not on file     Forced sexual activity: Not on file   Other Topics Concern    Not on file   Social History Narrative    Not on file       Family History   Problem Relation Age of Onset    COPD Father     Breast Cancer Sister          Current Outpatient Medications:     SPIRIVA RESPIMAT 2.5 MCG/ACT AERS inhaler, INHALE 2 PUFFS INTO THE LUNGS EVERY DAY, Disp: 4 g, Rfl: 2    atorvastatin (LIPITOR) 40 MG tablet, TAKE 1 TABLET BY MOUTH EVERY DAY.  FOLLOW UP ON MEDICATION FASTING IN 2 MONTHS, Disp: 90 tablet, Rfl: 0    buPROPion (WELLBUTRIN XL) 150 MG extended release tablet, TAKE 1 TABLET BY MOUTH TWICE DAILY, Disp: 60 tablet, Rfl: 5    Fluticasone Furoate-Vilanterol (BREO ELLIPTA) 200-25 MCG/INH AEPB, Inhale 1 puff into the lungs daily One puff daily, Disp: 1 each, Rfl: 11    albuterol sulfate HFA (VENTOLIN HFA) 108 (90 Base) MCG/ACT inhaler, Inhale 2 puffs into the lungs every 6 hours as needed for Wheezing, Disp: 1 Inhaler, Rfl: 3      ROS:  GENERAL:  No fevers, chills  HEENT:  Recent ear ache and sore throat that resolved  HEART:  No chest pain, no palpitations  LUNGS: Occasional SOB when out of the house, occasional wheezing   ABDOMEN:  No abdominal pains, no changes in stools  GENITOURINARY:  No increased frequency, no blood in urine  EXTREMITIES:  No swelling, no lesions  NEURO:  No numbness or tingling, no seizures  SKIN:  No new rashes, no changes in hair or nails  LYMPH:  No masses, no swelling neck, armpits, or groin    PHYSICAL EXAM:  Vitals:    06/10/19 0737   BP: 136/80   Pulse: 80   Resp: 16   Temp: 98 °F (36.7 °C)   SpO2: 97%       GENERAL:  Thin, looks older than listed age, smells of tobacco   HEENT:  No scleral icterus, no conjunctival irritation  NECK:  No thyromegaly, no bruits  LYMPH:  No cervical or supraclavicular adenopathy  HEART:  Tachycardic, regular, no murmurs  LUNGS:  Expiratory wheezing noted   ABDOMEN:  No distention, no organomegaly  EXTREMITIES:  No edema, no digital clubbing  NEURO:  No localizing deficits, CN II-XII intact    Pulmonary Function Testing  3/2018  My interpretation is severe obstruction with hyperinflation and reduced diffusing capacity. Bronchodilator response    Chest imaging from 2/2019 is reviewed. My interpretation is diffuse emphysema with RUL nodules. Some appear to be pleural based and could be scars    ECHO  Nothing recent    Alpha-1 Anti-trypsin levels:  113, Phenotype:  M1S       Lab Results   Component Value Date    WBC 5.9 02/22/2018    HGB 15.0 02/22/2018    HCT 43.6 02/22/2018    MCV 97.6 02/22/2018     02/22/2018       No results found for: BNP    Lab Results   Component Value Date    CREATININE 0.6 07/24/2018    BUN 4 (L) 07/24/2018     07/24/2018    K 3.8 07/24/2018     07/24/2018    CO2 24 07/24/2018     I reviewed the above labs and images    COPD Assessment Test    1. I never cough vs I cough all the time:  4  2. I have no phlegm vs I am completely full of phlegm. 4  3. My chest does not feel tight vs my chest feel vs tight. 4  4. Not breathless with inclines vs breathless with inclines. 4  5. Not limited with ADLs vs Very limited with ADLs. 4  6. Confidence leaving home vs no confidence. 3  7. I sleep soundly vs I don't sleep soundly. 5  8. I have lots of energy vs I have no energy. 5    TOTAL POINTS:  33    Scoring:    A) >30. Very high impact on quality of life. Optimize therapy including rehab  B) >20. High impact on quality of life.     C) 10-20. Medium impact on qualify of life  D) <10. Low impact on life  E)  5.  Upper limit of normal in health non-smoker      Assessment/Plan:  1. COPD, severe ([address]  About as good as it will be considering her continued and persistent tobacco abuse. Still not controlled. Wheezing on exam.  I told her to be more proactive with albuterol. Use it prior to exercise and prior to leaving the house. Continue with Breo and Sprivia    2. Centrilobular emphysema ([address]  Noted on CT. Did not do 6 MWT in the past.  Normal A-1 levels    3. Tobacco abuse  Active. Will not quit. Does not see reason to quit at this time    4. Lung nodule  Stable on imaging. There is actually additional nodules in the RUL too which could be scars as they are pleural based.   Yearly CT scans moving forward    Follow up in 6 months    Pulmonary Rehab:  Referred but declines to go    Lung Cancer Screening CT:  Up to date as of 2/2019

## 2019-06-26 ENCOUNTER — OFFICE VISIT (OUTPATIENT)
Dept: PSYCHOLOGY | Age: 59
End: 2019-06-26
Payer: COMMERCIAL

## 2019-06-26 DIAGNOSIS — F32.9 REACTIVE DEPRESSION: ICD-10-CM

## 2019-06-26 DIAGNOSIS — F41.1 GENERALIZED ANXIETY DISORDER: Primary | ICD-10-CM

## 2019-06-26 PROCEDURE — 90834 PSYTX W PT 45 MINUTES: CPT | Performed by: PSYCHOLOGIST

## 2019-06-26 ASSESSMENT — ANXIETY QUESTIONNAIRES
6. BECOMING EASILY ANNOYED OR IRRITABLE: 0-NOT AT ALL SURE
1. FEELING NERVOUS, ANXIOUS, OR ON EDGE: 1-SEVERAL DAYS
5. BEING SO RESTLESS THAT IT IS HARD TO SIT STILL: 0-NOT AT ALL SURE
GAD7 TOTAL SCORE: 10
2. NOT BEING ABLE TO STOP OR CONTROL WORRYING: 3-NEARLY EVERY DAY
4. TROUBLE RELAXING: 1-SEVERAL DAYS
3. WORRYING TOO MUCH ABOUT DIFFERENT THINGS: 3-NEARLY EVERY DAY
7. FEELING AFRAID AS IF SOMETHING AWFUL MIGHT HAPPEN: 2-OVER HALF THE DAYS

## 2019-06-26 ASSESSMENT — PATIENT HEALTH QUESTIONNAIRE - PHQ9
SUM OF ALL RESPONSES TO PHQ QUESTIONS 1-9: 1
SUM OF ALL RESPONSES TO PHQ QUESTIONS 1-9: 1
1. LITTLE INTEREST OR PLEASURE IN DOING THINGS: 0
2. FEELING DOWN, DEPRESSED OR HOPELESS: 1
SUM OF ALL RESPONSES TO PHQ9 QUESTIONS 1 & 2: 1

## 2019-06-26 NOTE — PROGRESS NOTES
Behavioral Health Consultation Follow-up  Cory Ruiz PsyD  Psychologist  6/26/2019  8:31 AM      Time spent with Patient: 40 minutes  This is patient's eighth  [facility] appointment. Reason for Consult:  SOCORRO, Reactive depression   Referring Provider: Jose Patterson MD  [address]    Feedback given to PCP. S:    Last appt: 5/15. Stressor: son, Lacey Barahona was in half-way for 4 days due to multiple domestic violence charges. Went with son to get  yesterday. 4 felony charges: carried concealed weapon, etc. Son meets with new  today. He is on house arrest which was condition of release. No one knows where his gf is. Goes back to court on 7/3 which pt will go with him. Ongoing stressor: mother and decision to go into NH, had mother 2 days last week, R and Sat. Met with siblings at NH, mother is no longer with younger sister, she dropped her off brother's [facility] about 3-4 weeks ago, door step. They did find NH, close to brother on [address]. Worried about brother, Nikkie Kelly as well, heart issues. He shares POA for mother with younger sister (who is difficult).      O:  MSE:    Appearance    alert, cooperative, crying  Appetite normal  Sleep disturbance Yes  Fatigue No  Loss of pleasure No  Impulsive behavior No  Speech    normal rate, normal volume and well articulated  Mood    Stressed   Affect    Congruent with full range  Thought Content    intact  Thought Process    linear, goal directed and coherent  Associations    logical connections  Insight    Good  Judgment    Intact  Orientation    oriented to person, place, time, and general circumstances  Memory    recent and remote memory intact  Attention/Concentration    intact  Morbid ideation No  Suicide Assessment    no suicidal ideation      History:    Medications:   Current Outpatient Medications   Medication Sig Dispense Refill    SPIRIVA RESPIMAT 2.5 MCG/ACT AERS inhaler INHALE 2 PUFFS INTO THE LUNGS EVERY DAY sexual activity: Not on file   Other Topics Concern    Not on file   Social History Narrative    Not on file       TOBACCO:   reports that she has been smoking cigarettes. She has a 75.00 pack-year smoking history. She has never used smokeless tobacco.  ETOH:   reports that she does not drink alcohol. Family History:   Family History   Problem Relation Age of Onset    COPD Father     Breast Cancer Sister        A:    See S: above    PHQ Scores 6/26/2019 5/15/2019 4/3/2019 3/6/2019 2/6/2019 1/3/2019 12/5/2018   PHQ2 Score 1 1 0 1 2 2 2   PHQ9 Score 1 1 0 1 2 2 2     Interpretation of Total Score Depression Severity: 1-4 = Minimal depression, 5-9 = Mild depression, 10-14 = Moderate depression, 15-19 = Moderately severe depression, 20-27 = Severe depression    SOCORRO 7 SCORE 6/26/2019 5/15/2019 4/3/2019 3/6/2019 2/6/2019 1/3/2019 12/5/2018   SOCORRO-7 Total Score 10 2 2 3 3 4 4     Interpretation of SOCORRO-7 score: 5-9 = mild anxiety, 10-14 = moderate anxiety, 15+ = severe anxiety. Recommend referral to behavioral health for scores 10 or greater. No si/hi risk. Diagnosis:    SOCORRO, reactive depression      Diagnosis Date    Abnormal screening mammogram 7/31/2017    Dx right pending    Anxiety     Arthritis     Asthma     Colon polyps 10/30/2017    Multiple path pending 10.2017  Recheck 10.2020    Depression     Multinodular thyroid 7/25/2017 7.2017 euthyroid recheck 1 year     Problems with primary support group and Problems related to the social environment    Plan:  Pt interventions:    Practiced assertive communication, Trained in strategies for increasing balanced thinking, Discussed self-care (sleep, nutrition, rewarding activities, social support, exercise), Discussed and problem-solved barriers in adhering to behavioral change plan and Supportive techniques    Pt Behavioral Change Plan:    1. Put taking care of mother on hold for now until after court case for the next month for stress reduction  2. Continue to take medications as prescribed  3. Continue to slow down any amount  4. Not taking bupropion since about December 2018, consider re-starting with current stressors  5. Go to court with son on 7/3  5.  Return to clinic for Dr Nerissa Miller in 3 weeks

## 2019-06-26 NOTE — PATIENT INSTRUCTIONS
1. Put taking care of mother on hold for now until after court case for the next month for stress reduction  2. Continue to take medications as prescribed  3. Continue to slow down any amount  4. Not taking bupropion since about December 2018, consider re-starting with current stressors  5. Go to court with son on 7/3  5.  Return to clinic for Dr Beryle Bee in 3 weeks

## 2019-06-26 NOTE — Clinical Note
FYI--pt's stress levels up. Mother may go into NH. Son been charged with 4 felony counts. Found out she has not been taking bupropion since December, doesn't want to restart. But agreed to see me sooner to keep closer eye on her during this family stress. F/u with me in 3 weeks.

## 2019-07-17 ENCOUNTER — OFFICE VISIT (OUTPATIENT)
Dept: PSYCHOLOGY | Age: 59
End: 2019-07-17
Payer: COMMERCIAL

## 2019-07-17 DIAGNOSIS — F32.9 REACTIVE DEPRESSION: ICD-10-CM

## 2019-07-17 DIAGNOSIS — F41.1 GENERALIZED ANXIETY DISORDER: Primary | ICD-10-CM

## 2019-07-17 PROCEDURE — 90832 PSYTX W PT 30 MINUTES: CPT | Performed by: PSYCHOLOGIST

## 2019-07-17 ASSESSMENT — ANXIETY QUESTIONNAIRES
2. NOT BEING ABLE TO STOP OR CONTROL WORRYING: 2-OVER HALF THE DAYS
4. TROUBLE RELAXING: 1-SEVERAL DAYS
6. BECOMING EASILY ANNOYED OR IRRITABLE: 0-NOT AT ALL SURE
3. WORRYING TOO MUCH ABOUT DIFFERENT THINGS: 2-OVER HALF THE DAYS
5. BEING SO RESTLESS THAT IT IS HARD TO SIT STILL: 0-NOT AT ALL SURE
GAD7 TOTAL SCORE: 6
7. FEELING AFRAID AS IF SOMETHING AWFUL MIGHT HAPPEN: 0-NOT AT ALL SURE
1. FEELING NERVOUS, ANXIOUS, OR ON EDGE: 1-SEVERAL DAYS

## 2019-07-17 ASSESSMENT — PATIENT HEALTH QUESTIONNAIRE - PHQ9
SUM OF ALL RESPONSES TO PHQ QUESTIONS 1-9: 2
SUM OF ALL RESPONSES TO PHQ QUESTIONS 1-9: 2
2. FEELING DOWN, DEPRESSED OR HOPELESS: 1
1. LITTLE INTEREST OR PLEASURE IN DOING THINGS: 1
SUM OF ALL RESPONSES TO PHQ9 QUESTIONS 1 & 2: 2

## 2019-08-01 DIAGNOSIS — E78.5 HYPERLIPIDEMIA, UNSPECIFIED HYPERLIPIDEMIA TYPE: ICD-10-CM

## 2019-08-01 RX ORDER — ATORVASTATIN CALCIUM 40 MG/1
TABLET, FILM COATED ORAL
Qty: 90 TABLET | Refills: 0 | Status: SHIPPED | OUTPATIENT
Start: 2019-08-01 | End: 2019-11-15

## 2019-08-21 ENCOUNTER — OFFICE VISIT (OUTPATIENT)
Dept: PSYCHOLOGY | Age: 59
End: 2019-08-21
Payer: COMMERCIAL

## 2019-08-21 ENCOUNTER — OFFICE VISIT (OUTPATIENT)
Dept: FAMILY MEDICINE CLINIC | Age: 59
End: 2019-08-21
Payer: COMMERCIAL

## 2019-08-21 VITALS
RESPIRATION RATE: 16 BRPM | BODY MASS INDEX: 19.29 KG/M2 | OXYGEN SATURATION: 98 % | WEIGHT: 113 LBS | SYSTOLIC BLOOD PRESSURE: 128 MMHG | DIASTOLIC BLOOD PRESSURE: 76 MMHG | HEIGHT: 64 IN | HEART RATE: 76 BPM

## 2019-08-21 DIAGNOSIS — E04.2 MULTINODULAR THYROID: ICD-10-CM

## 2019-08-21 DIAGNOSIS — R51.9 NONINTRACTABLE HEADACHE, UNSPECIFIED CHRONICITY PATTERN, UNSPECIFIED HEADACHE TYPE: Primary | ICD-10-CM

## 2019-08-21 DIAGNOSIS — E78.01 FAMILIAL HYPERCHOLESTEROLEMIA: ICD-10-CM

## 2019-08-21 DIAGNOSIS — J44.9 COPD, SEVERE (HCC): ICD-10-CM

## 2019-08-21 DIAGNOSIS — Z86.010 HX OF ADENOMATOUS POLYP OF COLON: ICD-10-CM

## 2019-08-21 DIAGNOSIS — F17.200 TOBACCO DEPENDENCE: ICD-10-CM

## 2019-08-21 DIAGNOSIS — F32.9 REACTIVE DEPRESSION: ICD-10-CM

## 2019-08-21 DIAGNOSIS — F41.1 GENERALIZED ANXIETY DISORDER: Primary | ICD-10-CM

## 2019-08-21 DIAGNOSIS — J43.1 PANLOBULAR EMPHYSEMA (HCC): ICD-10-CM

## 2019-08-21 DIAGNOSIS — E55.9 VITAMIN D DEFICIENCY: ICD-10-CM

## 2019-08-21 LAB
A/G RATIO: 1.6 (ref 1.1–2.2)
ALBUMIN SERPL-MCNC: 4.1 G/DL (ref 3.4–5)
ALP BLD-CCNC: 111 U/L (ref 40–129)
ALT SERPL-CCNC: 6 U/L (ref 10–40)
ANION GAP SERPL CALCULATED.3IONS-SCNC: 12 MMOL/L (ref 3–16)
AST SERPL-CCNC: 15 U/L (ref 15–37)
BILIRUB SERPL-MCNC: <0.2 MG/DL (ref 0–1)
BUN BLDV-MCNC: 3 MG/DL (ref 7–20)
CALCIUM SERPL-MCNC: 9 MG/DL (ref 8.3–10.6)
CHLORIDE BLD-SCNC: 105 MMOL/L (ref 99–110)
CHOLESTEROL, FASTING: 229 MG/DL (ref 0–199)
CO2: 25 MMOL/L (ref 21–32)
CREAT SERPL-MCNC: 0.6 MG/DL (ref 0.6–1.2)
GFR AFRICAN AMERICAN: >60
GFR NON-AFRICAN AMERICAN: >60
GLOBULIN: 2.5 G/DL
GLUCOSE BLD-MCNC: 78 MG/DL (ref 70–99)
HDLC SERPL-MCNC: 47 MG/DL (ref 40–60)
LDL CHOLESTEROL CALCULATED: 149 MG/DL
POTASSIUM SERPL-SCNC: 4.3 MMOL/L (ref 3.5–5.1)
SEDIMENTATION RATE, ERYTHROCYTE: 22 MM/HR (ref 0–30)
SODIUM BLD-SCNC: 142 MMOL/L (ref 136–145)
TOTAL PROTEIN: 6.6 G/DL (ref 6.4–8.2)
TRIGLYCERIDE, FASTING: 167 MG/DL (ref 0–150)
VITAMIN D 25-HYDROXY: 15.2 NG/ML
VLDLC SERPL CALC-MCNC: 33 MG/DL

## 2019-08-21 PROCEDURE — 90832 PSYTX W PT 30 MINUTES: CPT | Performed by: PSYCHOLOGIST

## 2019-08-21 PROCEDURE — 4004F PT TOBACCO SCREEN RCVD TLK: CPT | Performed by: INTERNAL MEDICINE

## 2019-08-21 PROCEDURE — 3023F SPIROM DOC REV: CPT | Performed by: INTERNAL MEDICINE

## 2019-08-21 PROCEDURE — G8420 CALC BMI NORM PARAMETERS: HCPCS | Performed by: INTERNAL MEDICINE

## 2019-08-21 PROCEDURE — 99214 OFFICE O/P EST MOD 30 MIN: CPT | Performed by: INTERNAL MEDICINE

## 2019-08-21 PROCEDURE — G8427 DOCREV CUR MEDS BY ELIG CLIN: HCPCS | Performed by: INTERNAL MEDICINE

## 2019-08-21 PROCEDURE — G8926 SPIRO NO PERF OR DOC: HCPCS | Performed by: INTERNAL MEDICINE

## 2019-08-21 PROCEDURE — 36415 COLL VENOUS BLD VENIPUNCTURE: CPT | Performed by: INTERNAL MEDICINE

## 2019-08-21 PROCEDURE — 3017F COLORECTAL CA SCREEN DOC REV: CPT | Performed by: INTERNAL MEDICINE

## 2019-08-21 RX ORDER — PREDNISONE 20 MG/1
TABLET ORAL
Qty: 8 TABLET | Refills: 0 | Status: SHIPPED | OUTPATIENT
Start: 2019-08-21 | End: 2019-09-11

## 2019-08-21 RX ORDER — ERGOCALCIFEROL 1.25 MG/1
50000 CAPSULE ORAL WEEKLY
Qty: 12 CAPSULE | Refills: 3 | Status: SHIPPED | OUTPATIENT
Start: 2019-08-21 | End: 2020-03-11 | Stop reason: SDUPTHER

## 2019-08-21 RX ORDER — VENLAFAXINE HYDROCHLORIDE 37.5 MG/1
37.5 CAPSULE, EXTENDED RELEASE ORAL DAILY
Qty: 30 CAPSULE | Refills: 0 | Status: SHIPPED | OUTPATIENT
Start: 2019-08-21 | End: 2019-09-11

## 2019-08-21 ASSESSMENT — ANXIETY QUESTIONNAIRES
1. FEELING NERVOUS, ANXIOUS, OR ON EDGE: 0-NOT AT ALL SURE
4. TROUBLE RELAXING: 0-NOT AT ALL SURE
2. NOT BEING ABLE TO STOP OR CONTROL WORRYING: 2-OVER HALF THE DAYS
5. BEING SO RESTLESS THAT IT IS HARD TO SIT STILL: 0-NOT AT ALL SURE
GAD7 TOTAL SCORE: 4
7. FEELING AFRAID AS IF SOMETHING AWFUL MIGHT HAPPEN: 0-NOT AT ALL SURE
6. BECOMING EASILY ANNOYED OR IRRITABLE: 0-NOT AT ALL SURE
3. WORRYING TOO MUCH ABOUT DIFFERENT THINGS: 2-OVER HALF THE DAYS

## 2019-08-21 ASSESSMENT — PATIENT HEALTH QUESTIONNAIRE - PHQ9
1. LITTLE INTEREST OR PLEASURE IN DOING THINGS: 0
SUM OF ALL RESPONSES TO PHQ QUESTIONS 1-9: 0
SUM OF ALL RESPONSES TO PHQ QUESTIONS 1-9: 0

## 2019-08-21 NOTE — PROGRESS NOTES
HPI: Italo Guy presents for headaches    Health issues include grieving, emphysema, adenomatous polyp with recheck in 2020, hyperlipidemia, the nodular goiter. Tobacco addiction. 1 month or more of headahces. Frontal and right post auricular. .no vision changes. + nausea. Can wake up with headache.  occuring 4-5 times a week. Sometimes wakes with them. Taking ecxedrine or advil.daily. No trauma. + dizziness. Laid down . No fevers. No currently new sinus sxs. The symptoms are new. Always stressful at home. No trauma. No vomiting. No nausea. No mental status changes. No fevers or chills.   dida.      multi lobar emphysema with FEV1 of 1.04 in 2017. Went from 4 packs to one pack a day of tobacco . Uses Symbicort twice, Spiriva. Once a day. albuterol when necessary. Sandra Hope of unprovoked pulmonary embolism and transient Coumadin use. No family history of coagulopathy or recurrent issues. CTA May 2017 with multilobar emphysema. . Does follow with pulmonology. feels like breathing is doing well today. Not interested in disability sticker.        41 years taken off ventilator 2017. Since then is been having some poor sleep. Decreased appetite Bupropion was helpful initially and has decreased the dosage. . No longer taking Pamelor. Several of life changes recently. Not suicidal. Following with psychology         Adenomatous polyp  and negative upper endoscopy. Recheck colonoscopy  Continue PPI.       Native LDL of 200 down to 134 after 6 weeks of statin. No adverse effect. Liver functions stableWith statin. Occasional chest pain with exertion. Improved with [employer]. No recurrent vertigo. No TIA or claudication.            vaginal delivery without complication. No gestational diabetes or hypertension. Tubal ligation. Early menopause. Last Pap  was normal.  Sister with breast cancer.  No family history of ovarian cancer BI-RADS 3 mammogram. Stable.     Euthyroid

## 2019-08-29 ENCOUNTER — HOSPITAL ENCOUNTER (OUTPATIENT)
Dept: MRI IMAGING | Age: 59
Discharge: HOME | End: 2019-08-29
Payer: COMMERCIAL

## 2019-08-29 DIAGNOSIS — R51.9 NONINTRACTABLE HEADACHE, UNSPECIFIED CHRONICITY PATTERN, UNSPECIFIED HEADACHE TYPE: ICD-10-CM

## 2019-08-29 PROCEDURE — 70551 MRI BRAIN STEM W/O DYE: CPT

## 2019-09-03 ENCOUNTER — TELEPHONE (OUTPATIENT)
Dept: FAMILY MEDICINE CLINIC | Age: 59
End: 2019-09-03

## 2019-09-11 ENCOUNTER — OFFICE VISIT (OUTPATIENT)
Dept: FAMILY MEDICINE CLINIC | Age: 59
End: 2019-09-11
Payer: COMMERCIAL

## 2019-09-11 VITALS
DIASTOLIC BLOOD PRESSURE: 75 MMHG | WEIGHT: 113 LBS | SYSTOLIC BLOOD PRESSURE: 129 MMHG | BODY MASS INDEX: 19.29 KG/M2 | HEIGHT: 64 IN | HEART RATE: 93 BPM

## 2019-09-11 DIAGNOSIS — Z12.39 BREAST SCREENING: ICD-10-CM

## 2019-09-11 DIAGNOSIS — Z80.3 FH: BREAST CANCER IN FIRST DEGREE RELATIVE: ICD-10-CM

## 2019-09-11 DIAGNOSIS — F17.200 TOBACCO DEPENDENCE: ICD-10-CM

## 2019-09-11 DIAGNOSIS — Z23 NEED FOR INFLUENZA VACCINATION: ICD-10-CM

## 2019-09-11 DIAGNOSIS — J43.1 PANLOBULAR EMPHYSEMA (HCC): ICD-10-CM

## 2019-09-11 DIAGNOSIS — R51.9 NONINTRACTABLE HEADACHE, UNSPECIFIED CHRONICITY PATTERN, UNSPECIFIED HEADACHE TYPE: Primary | ICD-10-CM

## 2019-09-11 DIAGNOSIS — E04.2 MULTINODULAR THYROID: ICD-10-CM

## 2019-09-11 DIAGNOSIS — Z86.010 HX OF ADENOMATOUS POLYP OF COLON: ICD-10-CM

## 2019-09-11 DIAGNOSIS — E78.01 FAMILIAL HYPERCHOLESTEROLEMIA: ICD-10-CM

## 2019-09-11 PROCEDURE — 90653 IIV ADJUVANT VACCINE IM: CPT | Performed by: INTERNAL MEDICINE

## 2019-09-11 PROCEDURE — 3017F COLORECTAL CA SCREEN DOC REV: CPT | Performed by: INTERNAL MEDICINE

## 2019-09-11 PROCEDURE — 3023F SPIROM DOC REV: CPT | Performed by: INTERNAL MEDICINE

## 2019-09-11 PROCEDURE — G8427 DOCREV CUR MEDS BY ELIG CLIN: HCPCS | Performed by: INTERNAL MEDICINE

## 2019-09-11 PROCEDURE — 99214 OFFICE O/P EST MOD 30 MIN: CPT | Performed by: INTERNAL MEDICINE

## 2019-09-11 PROCEDURE — 4004F PT TOBACCO SCREEN RCVD TLK: CPT | Performed by: INTERNAL MEDICINE

## 2019-09-11 PROCEDURE — 90471 IMMUNIZATION ADMIN: CPT | Performed by: INTERNAL MEDICINE

## 2019-09-11 PROCEDURE — G8926 SPIRO NO PERF OR DOC: HCPCS | Performed by: INTERNAL MEDICINE

## 2019-09-11 PROCEDURE — G8420 CALC BMI NORM PARAMETERS: HCPCS | Performed by: INTERNAL MEDICINE

## 2019-09-11 RX ORDER — TRAZODONE HYDROCHLORIDE 50 MG/1
TABLET ORAL
Qty: 30 TABLET | Refills: 0 | Status: SHIPPED | OUTPATIENT
Start: 2019-09-11 | End: 2019-10-08 | Stop reason: SDUPTHER

## 2019-09-11 RX ORDER — FLUTICASONE PROPIONATE 50 MCG
1 SPRAY, SUSPENSION (ML) NASAL DAILY
Qty: 2 BOTTLE | Refills: 0 | Status: SHIPPED | OUTPATIENT
Start: 2019-09-11 | End: 2019-11-06 | Stop reason: SDUPTHER
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
## 2019-09-11 NOTE — PATIENT INSTRUCTIONS
healthy ways to deal with stress. Headaches are most common during or right after stressful times. Take time to relax before and after you do something that has caused a headache in the past.  · Exercise daily to relieve stress. Relaxation exercises may help reduce tension. · Get plenty of sleep. · Eat regularly and well. Long periods without food can trigger a headache. · Treat yourself to a massage. Some people find that massages are very helpful in relieving tension. · Try to keep your muscles relaxed by keeping good posture. Check your jaw, face, neck, and shoulder muscles for tension, and try to relax them. When sitting at a desk, change positions often, and stretch for 30 seconds each hour. · Reduce eyestrain from computers by blinking frequently and looking away from the computer screen every so often. Make sure you have proper eyewear and that your monitor is set up properly, about an arm's length away. When should you call for help? Call 911 anytime you think you may need emergency care. For example, call if:    · You have signs of a stroke. These may include:  ? Sudden numbness, paralysis, or weakness in your face, arm, or leg, especially on only one side of your body. ? Sudden vision changes. ? Sudden trouble speaking. ? Sudden confusion or trouble understanding simple statements. ? Sudden problems with walking or balance. ? A sudden, severe headache that is different from past headaches.    Call your doctor now or seek immediate medical care if:    · You have new or worse nausea and vomiting.     · You have a new or higher fever.     · Your headache gets much worse.    Watch closely for changes in your health, and be sure to contact your doctor if:    · You are not getting better after 2 days (48 hours). Where can you learn more? Go to https://asiya.[employer]local.com. org and sign in to your "[employer] account.  Enter [phone] in the [employer] box to learn more about \"Tension

## 2019-09-19 ENCOUNTER — OFFICE VISIT (OUTPATIENT)
Dept: PSYCHOLOGY | Age: 59
End: 2019-09-19
Payer: COMMERCIAL

## 2019-09-19 DIAGNOSIS — F32.9 REACTIVE DEPRESSION: ICD-10-CM

## 2019-09-19 DIAGNOSIS — F41.1 GENERALIZED ANXIETY DISORDER: Primary | ICD-10-CM

## 2019-09-19 PROCEDURE — 90832 PSYTX W PT 30 MINUTES: CPT | Performed by: PSYCHOLOGIST

## 2019-09-19 ASSESSMENT — ANXIETY QUESTIONNAIRES
5. BEING SO RESTLESS THAT IT IS HARD TO SIT STILL: 0-NOT AT ALL SURE
2. NOT BEING ABLE TO STOP OR CONTROL WORRYING: 1-SEVERAL DAYS
GAD7 TOTAL SCORE: 6
7. FEELING AFRAID AS IF SOMETHING AWFUL MIGHT HAPPEN: 1-SEVERAL DAYS
6. BECOMING EASILY ANNOYED OR IRRITABLE: 0-NOT AT ALL SURE
1. FEELING NERVOUS, ANXIOUS, OR ON EDGE: 1-SEVERAL DAYS
3. WORRYING TOO MUCH ABOUT DIFFERENT THINGS: 2-OVER HALF THE DAYS
4. TROUBLE RELAXING: 1-SEVERAL DAYS

## 2019-09-19 ASSESSMENT — PATIENT HEALTH QUESTIONNAIRE - PHQ9
SUM OF ALL RESPONSES TO PHQ QUESTIONS 1-9: 1
1. LITTLE INTEREST OR PLEASURE IN DOING THINGS: 0
SUM OF ALL RESPONSES TO PHQ9 QUESTIONS 1 & 2: 1
2. FEELING DOWN, DEPRESSED OR HOPELESS: 1
SUM OF ALL RESPONSES TO PHQ QUESTIONS 1-9: 1

## 2019-09-19 NOTE — PROGRESS NOTES
file     Gets together: Not on file     Attends Congregational service: Not on file     Active member of club or organization: Not on file     Attends meetings of clubs or organizations: Not on file     Relationship status: Not on file    Intimate partner violence:     Fear of current or ex partner: Not on file     Emotionally abused: Not on file     Physically abused: Not on file     Forced sexual activity: Not on file   Other Topics Concern    Not on file   Social History Narrative    Not on file       TOBACCO:   reports that she has been smoking cigarettes. She has a 75.00 pack-year smoking history. She has never used smokeless tobacco.  ETOH:   reports that she does not drink alcohol. Family History:   Family History   Problem Relation Age of Onset    COPD Father     Breast Cancer Sister        A:    See S: above    PHQ Scores 9/19/2019 8/21/2019 7/17/2019 6/26/2019 5/15/2019 4/3/2019 3/6/2019   PHQ2 Score 1 - 2 1 1 0 1   PHQ9 Score 1 0 2 1 1 0 1     Interpretation of Total Score Depression Severity: 1-4 = Minimal depression, 5-9 = Mild depression, 10-14 = Moderate depression, 15-19 = Moderately severe depression, 20-27 = Severe depression    SOCORRO 7 SCORE 9/19/2019 8/21/2019 7/17/2019 6/26/2019 5/15/2019 4/3/2019 3/6/2019   SOCORRO-7 Total Score 6 4 6 10 2 2 3     Interpretation of SOCORRO-7 score: 5-9 = mild anxiety, 10-14 = moderate anxiety, 15+ = severe anxiety. Recommend referral to behavioral health for scores 10 or greater. Denied any si/hi risk.      Diagnosis:    SOCORRO, reactive depression       Diagnosis Date    Abnormal screening mammogram 7/31/2017    Dx right pending    Anxiety     Arthritis     Asthma     Colon polyps 10/30/2017    Multiple path pending 10.2017  Recheck 10.2020    Depression     Headache 8/21/2019    Multinodular thyroid 7/25/2017 7.2017 euthyroid recheck 1 year     Problems with primary support group      Plan:  Pt interventions:    Practiced assertive communication, Trained

## 2019-09-23 ENCOUNTER — HOSPITAL ENCOUNTER (OUTPATIENT)
Dept: WOMENS IMAGING | Age: 59
Discharge: HOME | End: 2019-09-23
Payer: COMMERCIAL

## 2019-09-23 DIAGNOSIS — Z12.39 BREAST SCREENING: ICD-10-CM

## 2019-09-23 PROCEDURE — 77067 SCR MAMMO BI INCL CAD: CPT

## 2019-10-07 ENCOUNTER — TELEPHONE (OUTPATIENT)
Dept: FAMILY MEDICINE CLINIC | Age: 59
End: 2019-10-07

## 2019-10-08 DIAGNOSIS — G47.00 INSOMNIA, UNSPECIFIED TYPE: Primary | ICD-10-CM

## 2019-10-08 RX ORDER — TRAZODONE HYDROCHLORIDE 50 MG/1
TABLET ORAL
Qty: 30 TABLET | Refills: 0 | Status: SHIPPED | OUTPATIENT
Start: 2019-10-08 | End: 2020-03-11

## 2019-10-23 ENCOUNTER — OFFICE VISIT (OUTPATIENT)
Dept: PSYCHOLOGY | Age: 59
End: 2019-10-23
Payer: COMMERCIAL

## 2019-10-23 DIAGNOSIS — F41.1 GENERALIZED ANXIETY DISORDER: Primary | ICD-10-CM

## 2019-10-23 DIAGNOSIS — F32.9 REACTIVE DEPRESSION: ICD-10-CM

## 2019-10-23 PROCEDURE — 90834 PSYTX W PT 45 MINUTES: CPT | Performed by: PSYCHOLOGIST

## 2019-10-23 ASSESSMENT — ANXIETY QUESTIONNAIRES
2. NOT BEING ABLE TO STOP OR CONTROL WORRYING: 1-SEVERAL DAYS
3. WORRYING TOO MUCH ABOUT DIFFERENT THINGS: 1-SEVERAL DAYS
5. BEING SO RESTLESS THAT IT IS HARD TO SIT STILL: 0-NOT AT ALL
6. BECOMING EASILY ANNOYED OR IRRITABLE: 0-NOT AT ALL
4. TROUBLE RELAXING: 0-NOT AT ALL
GAD7 TOTAL SCORE: 3
1. FEELING NERVOUS, ANXIOUS, OR ON EDGE: 1-SEVERAL DAYS
7. FEELING AFRAID AS IF SOMETHING AWFUL MIGHT HAPPEN: 0-NOT AT ALL

## 2019-11-13 ENCOUNTER — OFFICE VISIT (OUTPATIENT)
Dept: FAMILY MEDICINE CLINIC | Age: 59
End: 2019-11-13
Payer: COMMERCIAL

## 2019-11-13 VITALS
WEIGHT: 117 LBS | OXYGEN SATURATION: 97 % | DIASTOLIC BLOOD PRESSURE: 84 MMHG | RESPIRATION RATE: 12 BRPM | HEIGHT: 64 IN | HEART RATE: 85 BPM | BODY MASS INDEX: 19.97 KG/M2 | SYSTOLIC BLOOD PRESSURE: 163 MMHG

## 2019-11-13 DIAGNOSIS — Z87.898 HISTORY OF ABNORMAL MAMMOGRAM: ICD-10-CM

## 2019-11-13 DIAGNOSIS — Z86.010 HX OF ADENOMATOUS POLYP OF COLON: ICD-10-CM

## 2019-11-13 DIAGNOSIS — R51.9 NONINTRACTABLE HEADACHE, UNSPECIFIED CHRONICITY PATTERN, UNSPECIFIED HEADACHE TYPE: Primary | ICD-10-CM

## 2019-11-13 DIAGNOSIS — Z80.3 FH: BREAST CANCER IN FIRST DEGREE RELATIVE: ICD-10-CM

## 2019-11-13 DIAGNOSIS — M89.9 BONE DISORDER: ICD-10-CM

## 2019-11-13 DIAGNOSIS — E04.2 MULTINODULAR THYROID: ICD-10-CM

## 2019-11-13 DIAGNOSIS — R03.0 ELEVATED BLOOD PRESSURE READING: ICD-10-CM

## 2019-11-13 DIAGNOSIS — E55.9 VITAMIN D DEFICIENCY: ICD-10-CM

## 2019-11-13 DIAGNOSIS — E78.5 HYPERLIPIDEMIA, UNSPECIFIED HYPERLIPIDEMIA TYPE: ICD-10-CM

## 2019-11-13 DIAGNOSIS — F17.200 TOBACCO DEPENDENCE: ICD-10-CM

## 2019-11-13 DIAGNOSIS — E78.01 FAMILIAL HYPERCHOLESTEROLEMIA: ICD-10-CM

## 2019-11-13 DIAGNOSIS — Z91.89 AT HIGH RISK FOR OSTEOPOROSIS: ICD-10-CM

## 2019-11-13 DIAGNOSIS — J43.1 PANLOBULAR EMPHYSEMA (HCC): ICD-10-CM

## 2019-11-13 LAB
CHOLESTEROL, TOTAL: 259 MG/DL (ref 0–199)
HDLC SERPL-MCNC: 61 MG/DL (ref 40–60)
LDL CHOLESTEROL CALCULATED: 172 MG/DL
TRIGL SERPL-MCNC: 130 MG/DL (ref 0–150)
VITAMIN D 25-HYDROXY: 44.8 NG/ML
VLDLC SERPL CALC-MCNC: 26 MG/DL

## 2019-11-13 PROCEDURE — G8926 SPIRO NO PERF OR DOC: HCPCS | Performed by: INTERNAL MEDICINE

## 2019-11-13 PROCEDURE — 3023F SPIROM DOC REV: CPT | Performed by: INTERNAL MEDICINE

## 2019-11-13 PROCEDURE — G8427 DOCREV CUR MEDS BY ELIG CLIN: HCPCS | Performed by: INTERNAL MEDICINE

## 2019-11-13 PROCEDURE — G8482 FLU IMMUNIZE ORDER/ADMIN: HCPCS | Performed by: INTERNAL MEDICINE

## 2019-11-13 PROCEDURE — 4004F PT TOBACCO SCREEN RCVD TLK: CPT | Performed by: INTERNAL MEDICINE

## 2019-11-13 PROCEDURE — 3017F COLORECTAL CA SCREEN DOC REV: CPT | Performed by: INTERNAL MEDICINE

## 2019-11-13 PROCEDURE — G8420 CALC BMI NORM PARAMETERS: HCPCS | Performed by: INTERNAL MEDICINE

## 2019-11-13 PROCEDURE — 36415 COLL VENOUS BLD VENIPUNCTURE: CPT | Performed by: INTERNAL MEDICINE

## 2019-11-13 PROCEDURE — 99214 OFFICE O/P EST MOD 30 MIN: CPT | Performed by: INTERNAL MEDICINE

## 2019-11-15 RX ORDER — ATORVASTATIN CALCIUM 80 MG/1
80 TABLET, FILM COATED ORAL DAILY
Qty: 90 TABLET | Refills: 1 | Status: SHIPPED | OUTPATIENT
Start: 2019-11-15 | End: 2020-03-11 | Stop reason: SDUPTHER

## 2019-11-20 ENCOUNTER — TELEPHONE (OUTPATIENT)
Dept: FAMILY MEDICINE CLINIC | Age: 59
End: 2019-11-20

## 2019-11-20 RX ORDER — ONDANSETRON 4 MG/1
4 TABLET, FILM COATED ORAL 3 TIMES DAILY PRN
Qty: 15 TABLET | Refills: 0 | Status: SHIPPED | OUTPATIENT
Start: 2019-11-20 | End: 2020-05-11

## 2019-12-04 ENCOUNTER — OFFICE VISIT (OUTPATIENT)
Dept: PSYCHOLOGY | Age: 59
End: 2019-12-04
Payer: COMMERCIAL

## 2019-12-04 ENCOUNTER — TELEPHONE (OUTPATIENT)
Dept: FAMILY MEDICINE CLINIC | Age: 59
End: 2019-12-04

## 2019-12-04 ENCOUNTER — NURSE ONLY (OUTPATIENT)
Dept: FAMILY MEDICINE CLINIC | Age: 59
End: 2019-12-04

## 2019-12-04 VITALS — SYSTOLIC BLOOD PRESSURE: 125 MMHG | DIASTOLIC BLOOD PRESSURE: 85 MMHG

## 2019-12-04 DIAGNOSIS — F41.1 GENERALIZED ANXIETY DISORDER: Primary | ICD-10-CM

## 2019-12-04 DIAGNOSIS — Z01.30 BP CHECK: Primary | ICD-10-CM

## 2019-12-04 DIAGNOSIS — F32.9 REACTIVE DEPRESSION: ICD-10-CM

## 2019-12-04 PROCEDURE — 90832 PSYTX W PT 30 MINUTES: CPT | Performed by: PSYCHOLOGIST

## 2019-12-04 ASSESSMENT — PATIENT HEALTH QUESTIONNAIRE - PHQ9
1. LITTLE INTEREST OR PLEASURE IN DOING THINGS: 0
SUM OF ALL RESPONSES TO PHQ QUESTIONS 1-9: 1
2. FEELING DOWN, DEPRESSED OR HOPELESS: 1
SUM OF ALL RESPONSES TO PHQ QUESTIONS 1-9: 1
SUM OF ALL RESPONSES TO PHQ9 QUESTIONS 1 & 2: 1

## 2019-12-04 ASSESSMENT — ANXIETY QUESTIONNAIRES
1. FEELING NERVOUS, ANXIOUS, OR ON EDGE: 0-NOT AT ALL
6. BECOMING EASILY ANNOYED OR IRRITABLE: 0-NOT AT ALL
7. FEELING AFRAID AS IF SOMETHING AWFUL MIGHT HAPPEN: 0-NOT AT ALL
4. TROUBLE RELAXING: 1-SEVERAL DAYS
GAD7 TOTAL SCORE: 3
5. BEING SO RESTLESS THAT IT IS HARD TO SIT STILL: 0-NOT AT ALL
3. WORRYING TOO MUCH ABOUT DIFFERENT THINGS: 1-SEVERAL DAYS
2. NOT BEING ABLE TO STOP OR CONTROL WORRYING: 1-SEVERAL DAYS

## 2019-12-16 ENCOUNTER — OFFICE VISIT (OUTPATIENT)
Dept: PULMONOLOGY | Age: 59
End: 2019-12-16
Payer: COMMERCIAL

## 2019-12-16 VITALS
SYSTOLIC BLOOD PRESSURE: 138 MMHG | BODY MASS INDEX: 19.97 KG/M2 | RESPIRATION RATE: 16 BRPM | HEIGHT: 64 IN | DIASTOLIC BLOOD PRESSURE: 78 MMHG | WEIGHT: 117 LBS | TEMPERATURE: 97.5 F | OXYGEN SATURATION: 95 % | HEART RATE: 100 BPM

## 2019-12-16 DIAGNOSIS — Z87.891 PERSONAL HISTORY OF TOBACCO USE: ICD-10-CM

## 2019-12-16 DIAGNOSIS — J44.9 COPD, SEVERE (HCC): Primary | ICD-10-CM

## 2019-12-16 DIAGNOSIS — Z72.0 TOBACCO ABUSE: ICD-10-CM

## 2019-12-16 DIAGNOSIS — J43.2 CENTRILOBULAR EMPHYSEMA (HCC): ICD-10-CM

## 2019-12-16 PROCEDURE — G8427 DOCREV CUR MEDS BY ELIG CLIN: HCPCS | Performed by: INTERNAL MEDICINE

## 2019-12-16 PROCEDURE — 99213 OFFICE O/P EST LOW 20 MIN: CPT | Performed by: INTERNAL MEDICINE

## 2019-12-16 PROCEDURE — 4004F PT TOBACCO SCREEN RCVD TLK: CPT | Performed by: INTERNAL MEDICINE

## 2019-12-16 PROCEDURE — G8420 CALC BMI NORM PARAMETERS: HCPCS | Performed by: INTERNAL MEDICINE

## 2019-12-16 PROCEDURE — G0296 VISIT TO DETERM LDCT ELIG: HCPCS | Performed by: INTERNAL MEDICINE

## 2019-12-16 PROCEDURE — G8926 SPIRO NO PERF OR DOC: HCPCS | Performed by: INTERNAL MEDICINE

## 2019-12-16 PROCEDURE — 3023F SPIROM DOC REV: CPT | Performed by: INTERNAL MEDICINE

## 2019-12-16 PROCEDURE — 3017F COLORECTAL CA SCREEN DOC REV: CPT | Performed by: INTERNAL MEDICINE

## 2019-12-16 PROCEDURE — G8482 FLU IMMUNIZE ORDER/ADMIN: HCPCS | Performed by: INTERNAL MEDICINE

## 2020-01-06 ENCOUNTER — TELEPHONE (OUTPATIENT)
Dept: FAMILY MEDICINE CLINIC | Age: 60
End: 2020-01-06

## 2020-02-12 ENCOUNTER — OFFICE VISIT (OUTPATIENT)
Dept: PSYCHOLOGY | Age: 60
End: 2020-02-12
Payer: MEDICARE

## 2020-02-12 PROCEDURE — 90832 PSYTX W PT 30 MINUTES: CPT | Performed by: PSYCHOLOGIST

## 2020-02-12 ASSESSMENT — ANXIETY QUESTIONNAIRES
1. FEELING NERVOUS, ANXIOUS, OR ON EDGE: 0-NOT AT ALL
3. WORRYING TOO MUCH ABOUT DIFFERENT THINGS: 1-SEVERAL DAYS
7. FEELING AFRAID AS IF SOMETHING AWFUL MIGHT HAPPEN: 0-NOT AT ALL
2. NOT BEING ABLE TO STOP OR CONTROL WORRYING: 1-SEVERAL DAYS
4. TROUBLE RELAXING: 0-NOT AT ALL
5. BEING SO RESTLESS THAT IT IS HARD TO SIT STILL: 0-NOT AT ALL
6. BECOMING EASILY ANNOYED OR IRRITABLE: 0-NOT AT ALL
GAD7 TOTAL SCORE: 2

## 2020-02-12 ASSESSMENT — PATIENT HEALTH QUESTIONNAIRE - PHQ9
1. LITTLE INTEREST OR PLEASURE IN DOING THINGS: 0
SUM OF ALL RESPONSES TO PHQ9 QUESTIONS 1 & 2: 1
2. FEELING DOWN, DEPRESSED OR HOPELESS: 1
SUM OF ALL RESPONSES TO PHQ QUESTIONS 1-9: 1
SUM OF ALL RESPONSES TO PHQ QUESTIONS 1-9: 1

## 2020-02-12 NOTE — PROGRESS NOTES
activities, social support, exercise)    Pt Behavioral Change Plan:    1. Continue to visit mother any amount in NH during the week, ask about volunteering there today  2. Continue to take medications as prescribed  3. Continue to slow down any amount  4. Reach out to granddaughter, Maddie De Guzman to talk on phone and visit more with grandson Tana Pathak to boost mood  5. Continue to support son in long term as much as possible, his release date will be about 16 months from now  10.  Return to clinic for Dr Paul Ramos in 2 months

## 2020-02-12 NOTE — PATIENT INSTRUCTIONS
1. Continue to visit mother any amount in NH during the week, ask about volunteering there today  2. Continue to take medications as prescribed  3. Continue to slow down any amount  4. Reach out to granddaughter, Laura to talk on phone and visit more with grandson Lia Palmer to boost mood  5. Continue to support son in long term as much as possible, his release date will be about 16 months from now  10.  Return to clinic for Dr Kiara Fischer in 2 months

## 2020-02-20 ENCOUNTER — HOSPITAL ENCOUNTER (OUTPATIENT)
Dept: GENERAL RADIOLOGY | Age: 60
Discharge: HOME | End: 2020-02-20
Payer: MEDICARE

## 2020-02-20 ENCOUNTER — HOSPITAL ENCOUNTER (OUTPATIENT)
Dept: CT IMAGING | Age: 60
Discharge: HOME | End: 2020-02-20
Payer: MEDICARE

## 2020-02-20 PROCEDURE — 77080 DXA BONE DENSITY AXIAL: CPT

## 2020-02-20 PROCEDURE — G0297 LDCT FOR LUNG CA SCREEN: HCPCS

## 2020-02-21 PROBLEM — M81.8 OTHER OSTEOPOROSIS WITHOUT CURRENT PATHOLOGICAL FRACTURE: Status: ACTIVE | Noted: 2020-02-21

## 2020-03-11 ENCOUNTER — OFFICE VISIT (OUTPATIENT)
Dept: FAMILY MEDICINE CLINIC | Age: 60
End: 2020-03-11
Payer: MEDICARE

## 2020-03-11 VITALS
HEART RATE: 86 BPM | TEMPERATURE: 98 F | WEIGHT: 115 LBS | OXYGEN SATURATION: 98 % | DIASTOLIC BLOOD PRESSURE: 80 MMHG | BODY MASS INDEX: 19.63 KG/M2 | RESPIRATION RATE: 12 BRPM | HEIGHT: 64 IN | SYSTOLIC BLOOD PRESSURE: 130 MMHG

## 2020-03-11 PROBLEM — F33.0 MDD (MAJOR DEPRESSIVE DISORDER), RECURRENT EPISODE, MILD (HCC): Status: ACTIVE | Noted: 2020-03-11

## 2020-03-11 PROBLEM — Z91.89 AT HIGH RISK FOR OSTEOPOROSIS: Status: RESOLVED | Noted: 2019-11-13 | Resolved: 2020-03-11

## 2020-03-11 LAB
A/G RATIO: 1.4 (ref 1.1–2.2)
ALBUMIN SERPL-MCNC: 3.7 G/DL (ref 3.4–5)
ALP BLD-CCNC: 101 U/L (ref 40–129)
ALT SERPL-CCNC: 7 U/L (ref 10–40)
ANION GAP SERPL CALCULATED.3IONS-SCNC: 11 MMOL/L (ref 3–16)
AST SERPL-CCNC: 14 U/L (ref 15–37)
BILIRUB SERPL-MCNC: <0.2 MG/DL (ref 0–1)
BUN BLDV-MCNC: 6 MG/DL (ref 7–20)
CALCIUM SERPL-MCNC: 8.9 MG/DL (ref 8.3–10.6)
CHLORIDE BLD-SCNC: 101 MMOL/L (ref 99–110)
CO2: 25 MMOL/L (ref 21–32)
CREAT SERPL-MCNC: 0.6 MG/DL (ref 0.6–1.2)
GFR AFRICAN AMERICAN: >60
GFR NON-AFRICAN AMERICAN: >60
GLOBULIN: 2.6 G/DL
GLUCOSE BLD-MCNC: 76 MG/DL (ref 70–99)
HCT VFR BLD CALC: 36.8 % (ref 36–48)
HEMOGLOBIN: 12.5 G/DL (ref 12–16)
MCH RBC QN AUTO: 32.7 PG (ref 26–34)
MCHC RBC AUTO-ENTMCNC: 34 G/DL (ref 31–36)
MCV RBC AUTO: 96.2 FL (ref 80–100)
PDW BLD-RTO: 14.2 % (ref 12.4–15.4)
PLATELET # BLD: 291 K/UL (ref 135–450)
PMV BLD AUTO: 7.6 FL (ref 5–10.5)
POTASSIUM SERPL-SCNC: 4.4 MMOL/L (ref 3.5–5.1)
RBC # BLD: 3.83 M/UL (ref 4–5.2)
SODIUM BLD-SCNC: 137 MMOL/L (ref 136–145)
TOTAL PROTEIN: 6.3 G/DL (ref 6.4–8.2)
TSH REFLEX FT4: 1.11 UIU/ML (ref 0.27–4.2)
WBC # BLD: 4.4 K/UL (ref 4–11)

## 2020-03-11 PROCEDURE — 36415 COLL VENOUS BLD VENIPUNCTURE: CPT | Performed by: INTERNAL MEDICINE

## 2020-03-11 PROCEDURE — G8420 CALC BMI NORM PARAMETERS: HCPCS | Performed by: INTERNAL MEDICINE

## 2020-03-11 PROCEDURE — 4004F PT TOBACCO SCREEN RCVD TLK: CPT | Performed by: INTERNAL MEDICINE

## 2020-03-11 PROCEDURE — G8482 FLU IMMUNIZE ORDER/ADMIN: HCPCS | Performed by: INTERNAL MEDICINE

## 2020-03-11 PROCEDURE — G8926 SPIRO NO PERF OR DOC: HCPCS | Performed by: INTERNAL MEDICINE

## 2020-03-11 PROCEDURE — 3023F SPIROM DOC REV: CPT | Performed by: INTERNAL MEDICINE

## 2020-03-11 PROCEDURE — G8427 DOCREV CUR MEDS BY ELIG CLIN: HCPCS | Performed by: INTERNAL MEDICINE

## 2020-03-11 PROCEDURE — 99214 OFFICE O/P EST MOD 30 MIN: CPT | Performed by: INTERNAL MEDICINE

## 2020-03-11 PROCEDURE — 3017F COLORECTAL CA SCREEN DOC REV: CPT | Performed by: INTERNAL MEDICINE

## 2020-03-11 RX ORDER — ERGOCALCIFEROL 1.25 MG/1
50000 CAPSULE ORAL WEEKLY
Qty: 12 CAPSULE | Refills: 1 | Status: SHIPPED | OUTPATIENT
Start: 2020-03-11 | End: 2020-10-13

## 2020-03-11 RX ORDER — ATORVASTATIN CALCIUM 80 MG/1
80 TABLET, FILM COATED ORAL DAILY
Qty: 90 TABLET | Refills: 3 | Status: SHIPPED | OUTPATIENT
Start: 2020-03-11 | End: 2021-04-05

## 2020-03-11 RX ORDER — ERGOCALCIFEROL 1.25 MG/1
50000 CAPSULE ORAL WEEKLY
Qty: 12 CAPSULE | Refills: 3 | Status: SHIPPED | OUTPATIENT
Start: 2020-03-11 | End: 2020-03-11
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
## 2020-03-11 NOTE — PATIENT INSTRUCTIONS
day.  ? Eat foods rich in calcium, like yogurt, cheese, milk, and dark green vegetables. This is a good way to get the calcium you need. You can get vitamin D from eggs, fatty fish, cereal, and milk. ? Talk to your doctor about taking a calcium plus vitamin D supplement. Be careful, though. Adults ages 23 to 48 should not get more than 2,500 mg of calcium and 4,000 IU of vitamin D each day, whether it is from supplements and/or food. Adults ages 46 and older should not get more than 2,000 mg of calcium and 4,000 IU of vitamin D each day from supplements and/or food. · Limit alcohol to 2 drinks a day for men and 1 drink a day for women. Too much alcohol can cause health problems. · Do not smoke. Smoking puts you at a much higher risk for osteoporosis. If you need help quitting, talk to your doctor about stop-smoking programs and medicines. These can increase your chances of quitting for good. · Get regular bone-building exercise. Weight-bearing and resistance exercises keep bones healthy by working the muscles and bones against gravity. Start out at an exercise level that feels right for you. Add a little at a time until you can do the following:  ? Do 30 minutes of weight-bearing exercise on most days of the week. Walking, jogging, stair climbing, and dancing are good choices. ? Do resistance exercises with weights or elastic bands 2 to 3 days a week. · Reduce your risk of falls:  ? Wear supportive shoes with low heels and nonslip soles. ? Use a cane or walker, if you need it. Use shower chairs and bath benches. Put in handrails on stairways, around your shower or tub area, and near the toilet. ? Keep stairs, porches, and walkways well lit. Use night-lights. ? Remove throw rugs and other objects that are in the way. ? Avoid icy, wet, or slippery surfaces. ? Keep a cordless phone and a flashlight with new batteries by your bed. When should you call for help?   Watch closely for changes in your health, and be sure to contact your doctor if you have any problems. Where can you learn more? Go to https://chpepiceweb.[employer]. org and sign in to your [employer] account. Enter K100 in the [facility] box to learn more about \"Osteoporosis: Care Instructions. \"     If you do not have an account, please click on the \"Sign Up Now\" link. Current as of: August 6, 2019  Content Version: 12.3  © 9561-5309 [employer]. Care instructions adapted under license by [facility] ([facility]). If you have questions about a medical condition or this instruction, always ask your healthcare professional. [address] any warranty or liability for your use of this information.

## 2020-05-01 ASSESSMENT — PATIENT HEALTH QUESTIONNAIRE - PHQ9
SUM OF ALL RESPONSES TO PHQ QUESTIONS 1-9: 0
SUM OF ALL RESPONSES TO PHQ QUESTIONS 1-9: 0

## 2020-05-01 ASSESSMENT — LIFESTYLE VARIABLES: HOW OFTEN DO YOU HAVE A DRINK CONTAINING ALCOHOL: 0

## 2020-05-04 NOTE — PROGRESS NOTES
from Last 3 Encounters:   03/11/20 115 lb (52.2 kg)   12/16/19 117 lb (53.1 kg)   11/13/19 117 lb (53.1 kg)     There were no vitals filed for this visit. There is no height or weight on file to calculate BMI. Based upon direct observation of the patient, evaluation of cognition reveals Doing your best to discontinue tobacco.  I am happy to give you medicines for this in the future. Please find enclosed the living will and power of  papers. I would recommend following up with your dentist and having a repeat hearing test at your next office visit. Patient's complete Health Risk Assessment and screening values have been reviewed and are found in Flowsheets. The following problems were reviewed today and where indicated follow up appointments were made and/or referrals ordered. Positive Risk Factor Screenings with Interventions:     Cognitive: Words recalled: 1 Word Recalled  Total Score Interpretation: Positive Mini-Cog  Cognitive Impairment Interventions:  · states she was distracted + FH dementia in mom in 76s. Not interested in further evaluation at this time. Was advised on exercise diet. Possibility of MRI cognitive testing    Substance Abuse:  Social History     Tobacco History     Smoking Status  Current Every Day Smoker Smoking Frequency  1.5 packs/day for 50 years (76 pk yrs) Smoking Tobacco Type  Cigarettes    Smokeless Tobacco Use  Never Used          Alcohol History     Alcohol Use Status  No          Drug Use     Drug Use Status  No          Sexual Activity     Sexually Active  Yes Partners  Male               Audit Questionnaire: Screen for Alcohol Misuse  How often do you have a drink containing alcohol?: Never  Substance Abuse Interventions:  · Tobacco abuse:  tobacco cessation tips and resources provided    General Health:  General  In general, how would you say your health is?: Good  In the past 7 days, have you experienced any of the following?  New or Increased Pain, New Topic Date Due    Hepatitis C screen  1960    HIV screen  1974    DTaP/Tdap/Td vaccine (1 - Tdap) 1978    Shingles Vaccine (1 of 2) 2009    Annual Wellness Visit (AWV)  2020    Breast cancer screen  2020    Lipid screen  2020    Low dose CT lung screening  2021    Cervical cancer screen  2022    Colon cancer screen colonoscopy  10/20/2027    Flu vaccine  Completed    Pneumococcal 0-64 years Vaccine  Completed    Hepatitis A vaccine  Aged Out    Hepatitis B vaccine  Aged Out    Hib vaccine  Aged Out    Meningococcal (ACWY) vaccine  Aged Out     Recommendations for [employer] Due: see orders and patient instructions/AVS.  . Recommended screening schedule for the next 5-10 years is provided to the patient in written form: see Patient Instructions/AVS.    Orders   1.     2. Routine general medical examination at a health care facility  Hepatitis C Antibody             Medicare Annual Wellness Visit  Name: Toy Donovan Date: 2020   MRN: [id number] Sex: Female   Age: 63 y.o. Ethnicity: Non-/Non    : 1960 Race: Parrish Pollack is here for Medicare AWV    Screenings for behavioral, psychosocial and functional/safety risks, and cognitive dysfunction are all negative except as indicated below. These results, as well as other patient data from the [address] form, are documented in Flowsheets linked to this Encounter. Allergies   Allergen Reactions    Pcn [Penicillins]        Prior to Visit Medications    Medication Sig Taking?  Authorizing Provider   atorvastatin (LIPITOR) 80 MG tablet Take 1 tablet by mouth daily Yes Ann Marie Segura MD   vitamin D (ERGOCALCIFEROL) 1.25 MG (08321 UT) CAPS capsule Take 1 capsule by mouth once a week Yes Ann Marie Segura MD   fluticasone (FLONASE) 50 MCG/ACT nasal spray SHAKE LIQUID AND USE 1 SPRAY IN EACH NOSTRIL DAILY FOR NASAL CONGESTION Yes Clementina GUERRERO past year?: Yes  Hearing/Vision Interventions:  ·     Safety:  Safety  Do you have working smoke detectors?: Yes  Have all throw rugs been removed or fastened?: Yes  Do you have non-slip mats or surfaces in all bathtubs/showers?: Yes  Do all of your stairways have a railing or banister?: Yes  Are your doorways, halls and stairs free of clutter?: Yes  Do you always fasten your seatbelt when you are in a car?: (!) No(MOST OF THE TIME)  Safety Interventions:  ·     Personalized Preventive Plan   Current Health Maintenance Status  Immunization History   Administered Date(s) Administered    Influenza, Quadv, IM, (6 mo and older Fluzone, Flulaval, Fluarix and 3 yrs and older Afluria) 10/02/2017, 09/11/2018    Influenza, Triv, inactivated, subunit, adjuvanted, IM (Fluad 65 yrs and older) 09/11/2019    Pneumococcal Polysaccharide (Kulctxela61) 01/15/2018        Health Maintenance   Topic Date Due    Hepatitis C screen  1960    HIV screen  02/26/1974    DTaP/Tdap/Td vaccine (1 - Tdap) 02/26/1978    Shingles Vaccine (1 of 2) 02/26/2009    Annual Wellness Visit (AWV)  01/06/2020    Breast cancer screen  09/23/2020    Lipid screen  11/13/2020    Low dose CT lung screening  02/20/2021    Cervical cancer screen  07/20/2022    Colon cancer screen colonoscopy  10/20/2027    Flu vaccine  Completed    Pneumococcal 0-64 years Vaccine  Completed    Hepatitis A vaccine  Aged Out    Hepatitis B vaccine  Aged Out    Hib vaccine  Aged Out    Meningococcal (ACWY) vaccine  Aged Out     Recommendations for [employer] Due: see orders and patient instructions/AVS.  . Recommended screening schedule for the next 5-10 years is provided to the patient in written form: see Patient Instructions/AVS.    Billy Jose was seen today for medicare awv.     Diagnoses and all orders for this visit:    Other problems related to lifestyle    Routine general medical examination at a health care facility  -     Hepatitis C Antibody;

## 2020-05-05 ENCOUNTER — VIRTUAL VISIT (OUTPATIENT)
Dept: FAMILY MEDICINE CLINIC | Age: 60
End: 2020-05-05
Payer: MEDICARE

## 2020-05-05 PROCEDURE — 3017F COLORECTAL CA SCREEN DOC REV: CPT | Performed by: INTERNAL MEDICINE

## 2020-05-05 PROCEDURE — G0402 INITIAL PREVENTIVE EXAM: HCPCS | Performed by: INTERNAL MEDICINE

## 2020-05-05 NOTE — PATIENT INSTRUCTIONS
p.m. Avoid getting sunburned. Sunburn can increase your risk of skin cancer. Talk to your doctor about taking a calcium plus vitamin D supplement. Ask about what type of calcium is right for you, and how much to take at a time. Adults ages 23 to 48 should not get more than 2,500 mg of calcium and 4,000 IU of vitamin D each day, whether it is from supplements and/or food. Adults ages 46 and older should not get more than 2,000 mg of calcium and 4,000 IU of vitamin D each day from supplements and/or food. Get regular bone-building exercise. Weight-bearing and resistance exercises keep bones healthy by working the muscles and bones against gravity. Start out at an exercise level that feels right for you. Add a little at a time until you can do the following:  Do 30 minutes of weight-bearing exercise on most days of the week. Walking, jogging, stair climbing, and dancing are good choices. Do resistance exercises with weights or elastic bands 2 to 3 days a week. Limit alcohol. Drink no more than 1 alcohol drink a day if you are a woman. Drink no more than 2 alcohol drinks a day if you are a man. Do not smoke. Smoking can make bones thin faster. If you need help quitting, talk to your doctor about stop-smoking programs and medicines. These can increase your chances of quitting for good. When should you call for help? Watch closely for changes in your health, and be sure to contact your doctor if:  You need help with a healthy eating plan. You need help with an exercise plan    © 4609-2627 [employer]. Care instructions adapted under license by [facility]. This care instruction is for use with your licensed healthcare professional. If you have questions about a medical condition or this instruction, always ask your healthcare professional. [place] [address] any warranty or liability for your use of this information. Content Version: 9.4.85414;  Last Revised: June 20, impair your mental function. For example, vitamin B12 deficiency can cause a range of symptoms, including confusion. But, what if your nutritional needs are being met? Can herbs and supplements still offer a benefit? Researchers have investigated a range of natural remedies, such as ginkgo , ginseng , and the supplement phosphatidylserine (PS). So far, though, the evidence is inconsistent as to whether these products can improve memory or thinking. If you are interested in taking herbs and supplements, talk to your doctor first because they may interact with other medicines that you are taking. Exercise Regularly    Among the many benefits of regular exercise are increased blood flow to the brain and decreased risk of certain diseases that can interfere with memory function. One study found that even moderate exercise has a beneficial effect. Examples of \"moderate\" exercise include:   Playing 18 holes of golf once a week, without a cart   Playing tennis twice a week   Walking one mile per day   Manage Stress    It can be tough to remember what is important when your mind is cluttered. Make time for relaxation. Choose activities that calm you down, and make it routine. Manage Chronic Conditions    Side effects of high blood pressure , diabetes, and heart disease can interfere with mental function. Many of the lifestyle steps discussed here can help manage these conditions. Strive to eat a healthy diet, exercise regularly, get stress under control, and follow your doctor's advice for your condition. Minimize Medications    Talk to your doctor about the medicines that you take. Some may be unnecessary. Also, healthy lifestyle habits may lower the need for certain drugs. Last Reviewed: April 2010 Juju Bautista MD   Updated: 4/13/2010   ·        Learning About Medical Power of   What is a medical power of ?     A medical power of , also called a durable power of  for health care, is one type of the legal forms called advance directives. It lets you name the person you want to make treatment decisions for you if you can't speak or decide for yourself. The person you choose is called your health care agent. This person is also called a health care proxy or health care surrogate. A medical power of  may be called something else in your state. How do you choose a health care agent? Choose your health care agent carefully. This person may or may not be a family member. Talk to the person before you make your final decision. Make sure he or she is comfortable with this responsibility. It's a good idea to choose someone who:  Is at least 25years old. Knows you well and understands what makes life meaningful for you. Understands your [employer]ology and moral values. Will do what you want, not what he or she wants. Will be able to make difficult choices at a stressful time. Will be able to refuse or stop treatment, if that is what you would want, even if you could die. Will be firm and confident with health professionals if needed. Will ask questions to get needed information. Lives near you or agrees to travel to you if needed. Your family may help you make medical decisions while you can still be part of that process. But it's important to choose one person to be your health care agent in case you aren't able to make decisions for yourself. If you don't fill out the legal form and name a health care agent, the decisions your family can make may be limited. A health care agent may be called something else in your state. Who will make decisions for you if you don't have a health care agent? If you don't have a health care agent or a living will, you may not get the care you want. Decisions may be made by family members who disagree about your medical care. Or decisions may be made by a medical professional who doesn't know you well.  In some cases, a  makes the decisions. When you name a health care agent, it is very clear who has the power to make health decisions for you. How do you name a health care agent? You name your health care agent on a legal form. This form is usually called a medical power of . Ask your hospital, state bar association, or office on aging where to find these forms. You must sign the form to make it legal. Some states require you to get the form notarized. This means that a person called a  watches you sign the form and then he or she signs the form. Some states also require that two or more witnesses sign the form. Be sure to tell your family members and doctors who your health care agent is. Where can you learn more? Go to https://G.I. [employer]. org and sign in to your [employer] account. Enter [id number] in the Anita Margarita box to learn more about \"Learning About [address]. \"     If you do not have an account, please click on the \"Sign Up Now\" link. Current as of: December 8, 2019Content Version: 12.4  © 7222-6306 Healthwise, Incorporated. Care instructions adapted under license by [facility] ([facility]). If you have questions about a medical condition or this instruction, always ask your healthcare professional. [address] any warranty or liability for your use of this information. ·   Personalized Preventive Plan for Ivory Hirsch - 5/5/2020  Medicare offers a range of preventive health benefits. Some of the tests and screenings are paid in full while other may be subject to a deductible, co-insurance, and/or copay. Some of these benefits include a comprehensive review of your medical history including lifestyle, illnesses that may run in your family, and various assessments and screenings as appropriate.     After reviewing your medical record and screening and assessments performed today your provider may have ordered immunizations, labs, imaging, and/or referrals for you. A list of these orders (if applicable) as well as your Preventive Care list are included within your After Visit Summary for your review. Other Preventive Recommendations:    A preventive eye exam performed by an eye specialist is recommended every 1-2 years to screen for glaucoma; cataracts, macular degeneration, and other eye disorders. A preventive dental visit is recommended every 6 months. Try to get at least 150 minutes of exercise per week or 10,000 steps per day on a pedometer . Order or download the FREE \"Exercise & Physical Activity: Your Everyday Guide\" from [employer]. Call 5-922.684.3412 or search [employer] online. You need 3257-7629 mg of calcium and 3280-0265 IU of vitamin D per day. It is possible to meet your calcium requirement with diet alone, but a vitamin D supplement is usually necessary to meet this goal.  When exposed to the sun, use a sunscreen that protects against both UVA and UVB radiation with an SPF of 30 or greater. Reapply every 2 to 3 hours or after sweating, drying off with a towel, or swimming. Always wear a seat belt when traveling in a car. Always wear a helmet when riding a bicycle or motorcycle.

## 2020-05-10 NOTE — PROGRESS NOTES
heart burn, dysphagia or esophageal dysmotility. No change in bowel habits or any inflammatory bowel disease. Genitourinary: No history renal disease, miscarriages. Hematologic/Lymphatic: No abnormal bruising or bleeding, blood clots or swollen lymph nodes. Neurological: No history of seizure or focal weakness. No history of neuropathies, paresthesias or hyperesthesias, facial droop, diplopia  Psychiatric: No history of bipolar disease, depression  Endocrine: Denies any polyuria, polydipsia  Allergic/Immunologic: No nasal congestion or hives. I have reviewed patients Past medical History, Social History and Family History as mentioned in her chart and this remains unchanged fromprevious. Past Medical History:   Diagnosis Date    Abnormal screening mammogram 7/31/2017    Dx right pending    Anxiety     Arthritis     Asthma     Colon polyps 10/30/2017    Multiple path pending 10.2017  Recheck 10.2020    Depression     Headache 8/21/2019    Multinodular thyroid 7/25/2017 7.2017 euthyroid recheck 1 year    Other osteoporosis without current pathological fracture 2/21/2020 2.2020  T-score of  -3.8 and a Z-score of -2.3.  spine; hip -3.0 and a Z-score of -1.7     Past Surgical History:   Procedure Laterality Date    TONSILLECTOMY      TUBAL LIGATION       Social History     Socioeconomic History    Marital status:       Spouse name: Not on file    Number of children: Not on file    Years of education: Not on file    Highest education level: Not on file   Occupational History    Not on file   Social Needs    Financial resource strain: Not on file    Food insecurity     Worry: Not on file     Inability: Not on file    Transportation needs     Medical: Not on file     Non-medical: Not on file   Tobacco Use    Smoking status: Current Every Day Smoker     Packs/day: 1.50     Years: 50.00     Pack years: 75.00     Types: Cigarettes    Smokeless tobacco: Never Used   Substance and Sexual Activity    Alcohol use: No    Drug use: No    Sexual activity: Yes     Partners: Male   Lifestyle    Physical activity     Days per week: Not on file     Minutes per session: Not on file    Stress: Not on file   Relationships    Social connections     Talks on phone: Not on file     Gets together: Not on file     Attends Nondenominational service: Not on file     Active member of club or organization: Not on file     Attends meetings of clubs or organizations: Not on file     Relationship status: Not on file    Intimate partner violence     Fear of current or ex partner: Not on file     Emotionally abused: Not on file     Physically abused: Not on file     Forced sexual activity: Not on file   Other Topics Concern    Not on file   Social History Narrative    Not on file     Family History   Problem Relation Age of Onset    COPD Father     Breast Cancer Sister          PHYSICAL EXAM   Vitals:    05/11/20 0926   BP: 138/74   Site: Left Upper Arm   Position: Sitting   Cuff Size: Medium Adult   Pulse: 93   Temp: 97.5 °F (36.4 °C)   TempSrc: Oral   Weight: 114 lb 12.8 oz (52.1 kg)   Height: 5' 5.5\" (1.664 m)     Physical Exam  Constitutional:  Well developed, well nourished, no acute distress, non-toxic appearance   Musculoskeletal:    RIGHT  Swell  Tender  ROM  LEFT  Swell  Tender  ROM    DIP2  0  0   [place]  0  0   [place]   DIP3  0  0   [place]  0  0   [place]   DIP4  0  0   [place]  0  0   [place]   DIP5  0  0   [place]  0  0   [place]   PIP1  0  0   bony change  0  0   bony change   PIP2  0  0   bony change  0  0   bony change   PIP3  0  0   bony change  0  0   bony change   PIP4  0  0   bony change  0  0   bony change   PIP5  0  0   bony change  0  0   bony change   MCP1  0  0  FULL   0  0  FULL    MCP2  0  0  FULL   0  0  FULL    MCP3  0  0  FULL   0  0  FULL    MCP4  0  0  FULL   0  0  FULL    MCP5  0  0  FULL   0  0  FULL    Wrist  0  0  FULL   0  0  FULL    Elbow  0  0  FULL   0  0  FULL BUN 6 (L) 03/11/2020 1109    CREATININE 0.6 03/11/2020 1109        Component Value Date/Time    CALCIUM 8.9 03/11/2020 1109    ALKPHOS 101 03/11/2020 1109    AST 14 (L) 03/11/2020 1109    ALT 7 (L) 03/11/2020 1109    BILITOT <0.2 03/11/2020 1109          Lab Results   Component Value Date    SEDRATE 22 08/21/2019     No results found for: CRP  No results found for: SANDRA, JHONNY, SSA, SSB, C3, C4  No results found for: RF, CCPABIGG  No results found for: SANDRA, ANATITER, ANAINT, PATH  No results found for: DSDNAG, DSDNAIGGIFA  No results found for: SSAROAB, SSALAAB  No results found for: SMAB, RNPAB  No results found for: CENTABIGG  No results found for: C3, C4, ACE  Lab Results   Component Value Date    VITD25 44.8 11/13/2019     No results found for: Renata Milesjenaro  No results found for: Walton Otter  Lab Results   Component Value Date    TSHFT4 1.11 03/11/2020    TSH 1.45 03/13/2015     Lab Results   Component Value Date    VITD25 44.8 11/13/2019       Radiology:    EXAMINATION:   BONE DENSITOMETRY       2/20/2020 7:50 am       TECHNIQUE:   A bone density DEXA scan was performed of the lumbar spine and bilateral hips   on a [employer] system.       COMPARISON:   None.       HISTORY:   ORDERING SYSTEM PROVIDED HISTORY: Bone disorder       FINDINGS:   LUMBAR SPINE:       The bone mineral density in the lumbar spine including the L1 thru L4 levels   is measured at 0.629 g/cm2, which corresponds to a T-score of  -3.8 and a   Z-score of -2.3.  This is within the osteoporosis range by WHO criteria.       LEFT HIP:       The bone mineral density in the total hip is measured at 0.613 g/cm2   corresponding to a T-score of -2.7 and a Z-score of -1.7.  This is within the   osteoporosis range by WHO criteria.       The bone mineral density of the femoral neck is measured at 0.515 g/cm2   corresponding to a T-score of -3.0 and a Z-score of -1.7.  This is within the   osteoporosis range by WHO criteria.       RIGHT HIP:       The bone mineral density in the total hip is measured at 0.596 g/cm2   corresponding to a T-score of -2.8 and a Z-score of -1.8.  This is within the   osteoporosis range by [facility] criteria.       The bone mineral density of the femoral neck is measured at 0.519 g/cm2   corresponding to a T-score of -3.0 and a Z-score of -1.6.  This is within the   osteoporosis range by [facility] criteria.           Impression   Osteoporosis by WHO criteria.             ASSESSMENT AND PLAN      Assessment/Plan:      ASSESSMENT:    1. Age-related osteoporosis without current pathological fracture    2. Primary osteoarthritis involving multiple joints        PLAN:     Billy Jose was seen today for establish care. Diagnoses and all orders for this visit:    Age-related osteoporosis without current pathological fracture-DEXA scan with T score of -3.8 at the lumbar spine, -3 at the right and left femoral neck. No history of fragility fractures. No family history of fractures. TSH, vitamin D normal  We will check PTH  Discussed Forteo injections, she refused. Unable to do daily injections. Risk versus benefit was explained  We will start Reclast infusion once a year  Continue vitamin D  Start calcium 500 mg daily  Weightbearing exercises  Side effects of Reclast include osteonecrosis of jaw, atypical femur fractures, hypersensitivity reaction, hypocalcemia, musculoskeletal pain, ocular infection and were explained to the patient in detail    -     PTH, Intact; Future  -     zoledronic acid (RECLAST) 5 MG/100ML SOLN; Infuse 100 mLs intravenously once for 1 dose  -     calcium gluconate 500 MG tablet; Take 1 tablet by mouth daily    Primary osteoarthritis involving multiple joints-Osteoarthritis/ Joint pain : Discussed about diagnosis and management of osteoarthritis. There are no FDA approved disease modifying agents. Goal is to control pain and increase mobility. Discussed the importance of wt loss, exercises, formal PT, joint braces/splints.  First line

## 2020-05-11 ENCOUNTER — OFFICE VISIT (OUTPATIENT)
Dept: RHEUMATOLOGY | Age: 60
End: 2020-05-11
Payer: MEDICARE

## 2020-05-11 VITALS
WEIGHT: 114.8 LBS | SYSTOLIC BLOOD PRESSURE: 138 MMHG | TEMPERATURE: 97.5 F | HEIGHT: 66 IN | DIASTOLIC BLOOD PRESSURE: 74 MMHG | BODY MASS INDEX: 18.45 KG/M2 | HEART RATE: 93 BPM

## 2020-05-11 DIAGNOSIS — M15.9 PRIMARY OSTEOARTHRITIS INVOLVING MULTIPLE JOINTS: ICD-10-CM

## 2020-05-11 DIAGNOSIS — M81.0 AGE-RELATED OSTEOPOROSIS WITHOUT CURRENT PATHOLOGICAL FRACTURE: ICD-10-CM

## 2020-05-11 LAB
C-REACTIVE PROTEIN: <0.3 MG/L (ref 0–5.1)
HEPATITIS B CORE IGM ANTIBODY: NORMAL
HEPATITIS B SURFACE ANTIGEN INTERPRETATION: NORMAL
HEPATITIS C ANTIBODY INTERPRETATION: NORMAL
PARATHYROID HORMONE INTACT: 55.2 PG/ML (ref 14–72)
RHEUMATOID FACTOR: 26 IU/ML
SEDIMENTATION RATE, ERYTHROCYTE: 18 MM/HR (ref 0–30)

## 2020-05-11 PROCEDURE — G8427 DOCREV CUR MEDS BY ELIG CLIN: HCPCS | Performed by: INTERNAL MEDICINE

## 2020-05-11 PROCEDURE — G8420 CALC BMI NORM PARAMETERS: HCPCS | Performed by: INTERNAL MEDICINE

## 2020-05-11 PROCEDURE — 4004F PT TOBACCO SCREEN RCVD TLK: CPT | Performed by: INTERNAL MEDICINE

## 2020-05-11 PROCEDURE — 99205 OFFICE O/P NEW HI 60 MIN: CPT | Performed by: INTERNAL MEDICINE

## 2020-05-11 PROCEDURE — 3017F COLORECTAL CA SCREEN DOC REV: CPT | Performed by: INTERNAL MEDICINE

## 2020-05-11 RX ORDER — ZOLEDRONIC ACID 5 MG/100ML
5 INJECTION, SOLUTION INTRAVENOUS ONCE
Qty: 100 ML | Refills: 0 | Status: SHIPPED | OUTPATIENT
Start: 2020-05-11 | End: 2021-06-21 | Stop reason: ALTCHOICE

## 2020-05-11 RX ORDER — CALCIUM GLUCONATE 45(500) MG
500 TABLET ORAL DAILY
Qty: 30 TABLET | Refills: 5 | Status: SHIPPED | OUTPATIENT
Start: 2020-05-11

## 2020-05-11 RX ORDER — NAPROXEN 375 MG/1
375 TABLET ORAL 2 TIMES DAILY WITH MEALS
Qty: 60 TABLET | Refills: 5 | Status: SHIPPED | OUTPATIENT
Start: 2020-05-11 | End: 2020-12-28

## 2020-05-12 ENCOUNTER — TELEPHONE (OUTPATIENT)
Dept: RHEUMATOLOGY | Age: 60
End: 2020-05-12

## 2020-05-12 ENCOUNTER — HOSPITAL ENCOUNTER (OUTPATIENT)
Age: 60
Discharge: HOME | End: 2020-05-12
Payer: MEDICARE

## 2020-05-12 ENCOUNTER — HOSPITAL ENCOUNTER (OUTPATIENT)
Dept: GENERAL RADIOLOGY | Age: 60
Discharge: HOME | End: 2020-05-12
Payer: MEDICARE

## 2020-05-12 LAB — CYCLIC CITRULLINATED PEPTIDE ANTIBODY IGG: <0.5 U/ML (ref 0–2.9)

## 2020-05-12 PROCEDURE — 73562 X-RAY EXAM OF KNEE 3: CPT

## 2020-05-12 PROCEDURE — 73130 X-RAY EXAM OF HAND: CPT

## 2020-05-12 NOTE — TELEPHONE ENCOUNTER
Called pt and informed her of results and Dr. Bluford Canavan instructions, pt voiced understanding.

## 2020-05-14 ENCOUNTER — TELEPHONE (OUTPATIENT)
Dept: RHEUMATOLOGY | Age: 60
End: 2020-05-14

## 2020-05-14 NOTE — TELEPHONE ENCOUNTER
Reclast #1   Dx: M81.0   dexa 02/20/20  Tried to have the lab add on CMP but blood is too old. Will need to be redrawn.

## 2020-06-16 ENCOUNTER — OFFICE VISIT (OUTPATIENT)
Dept: PULMONOLOGY | Age: 60
End: 2020-06-16
Payer: MEDICARE

## 2020-06-16 VITALS
HEIGHT: 66 IN | RESPIRATION RATE: 16 BRPM | DIASTOLIC BLOOD PRESSURE: 77 MMHG | SYSTOLIC BLOOD PRESSURE: 123 MMHG | WEIGHT: 110 LBS | HEART RATE: 87 BPM | OXYGEN SATURATION: 99 % | TEMPERATURE: 97.6 F | BODY MASS INDEX: 17.68 KG/M2

## 2020-06-16 DIAGNOSIS — M81.0 AGE-RELATED OSTEOPOROSIS WITHOUT CURRENT PATHOLOGICAL FRACTURE: ICD-10-CM

## 2020-06-16 LAB
A/G RATIO: 1.8 (ref 1.1–2.2)
ALBUMIN SERPL-MCNC: 4.4 G/DL (ref 3.4–5)
ALP BLD-CCNC: 131 U/L (ref 40–129)
ALT SERPL-CCNC: 14 U/L (ref 10–40)
ANION GAP SERPL CALCULATED.3IONS-SCNC: 12 MMOL/L (ref 3–16)
AST SERPL-CCNC: 21 U/L (ref 15–37)
BILIRUB SERPL-MCNC: 0.6 MG/DL (ref 0–1)
BUN BLDV-MCNC: 6 MG/DL (ref 7–20)
CALCIUM SERPL-MCNC: 9.4 MG/DL (ref 8.3–10.6)
CHLORIDE BLD-SCNC: 99 MMOL/L (ref 99–110)
CO2: 26 MMOL/L (ref 21–32)
CREAT SERPL-MCNC: 0.6 MG/DL (ref 0.6–1.2)
GFR AFRICAN AMERICAN: >60
GFR NON-AFRICAN AMERICAN: >60
GLOBULIN: 2.5 G/DL
GLUCOSE BLD-MCNC: 81 MG/DL (ref 70–99)
POTASSIUM SERPL-SCNC: 4.3 MMOL/L (ref 3.5–5.1)
SODIUM BLD-SCNC: 137 MMOL/L (ref 136–145)
TOTAL PROTEIN: 6.9 G/DL (ref 6.4–8.2)

## 2020-06-16 PROCEDURE — 3023F SPIROM DOC REV: CPT | Performed by: INTERNAL MEDICINE

## 2020-06-16 PROCEDURE — G8926 SPIRO NO PERF OR DOC: HCPCS | Performed by: INTERNAL MEDICINE

## 2020-06-16 PROCEDURE — G8427 DOCREV CUR MEDS BY ELIG CLIN: HCPCS | Performed by: INTERNAL MEDICINE

## 2020-06-16 PROCEDURE — 4004F PT TOBACCO SCREEN RCVD TLK: CPT | Performed by: INTERNAL MEDICINE

## 2020-06-16 PROCEDURE — G8419 CALC BMI OUT NRM PARAM NOF/U: HCPCS | Performed by: INTERNAL MEDICINE

## 2020-06-16 PROCEDURE — 3017F COLORECTAL CA SCREEN DOC REV: CPT | Performed by: INTERNAL MEDICINE

## 2020-06-16 PROCEDURE — 99213 OFFICE O/P EST LOW 20 MIN: CPT | Performed by: INTERNAL MEDICINE

## 2020-06-16 NOTE — PROGRESS NOTES
Diminished but clear   ABDOMEN:  No distention, no organomegaly  EXTREMITIES:  No edema, no digital clubbing  NEURO:  No localizing deficits, CN II-XII intact    Pulmonary Function Testing  3/2018  My interpretation is severe obstruction with hyperinflation and reduced diffusing capacity. Bronchodilator response    Chest imaging from 2/2020 is reviewed. My interpretation is emphysema and nodules. Calcifications    ECHO  Nothing recent    Alpha-1 Anti-trypsin levels:  113, Phenotype:  M1S       Lab Results   Component Value Date    WBC 4.4 03/11/2020    HGB 12.5 03/11/2020    HCT 36.8 03/11/2020    MCV 96.2 03/11/2020     03/11/2020       No results found for: BNP    Lab Results   Component Value Date    CREATININE 0.6 03/11/2020    BUN 6 (L) 03/11/2020     03/11/2020    K 4.4 03/11/2020     03/11/2020    CO2 25 03/11/2020     I reviewed the above labs and images      Assessment/Plan:  1. COPD, severe ([address]  Controlled and as good as it can be considering 1 ppd smoking. Continue with triple therapy    2. Centrilobular emphysema ([address]  Noted on CT. Normal Alpha-1 levels    3. Tobacco abuse  1 ppd.   No plans to quit ever      Follow up in 6 months    Pulmonary Rehab:  Declined in the past    Lung Cancer Screening CT:  Up to date as of 2/2020

## 2020-06-23 ENCOUNTER — NURSE ONLY (OUTPATIENT)
Dept: RHEUMATOLOGY | Age: 60
End: 2020-06-23
Payer: MEDICARE

## 2020-06-23 VITALS
TEMPERATURE: 98 F | HEART RATE: 72 BPM | RESPIRATION RATE: 16 BRPM | SYSTOLIC BLOOD PRESSURE: 128 MMHG | DIASTOLIC BLOOD PRESSURE: 74 MMHG

## 2020-06-23 PROCEDURE — 96413 CHEMO IV INFUSION 1 HR: CPT | Performed by: INTERNAL MEDICINE

## 2020-06-23 RX ORDER — ZOLEDRONIC ACID 5 MG/100ML
5 INJECTION, SOLUTION INTRAVENOUS ONCE
Status: COMPLETED | OUTPATIENT
Start: 2020-06-23 | End: 2020-06-23

## 2020-06-23 RX ADMIN — ZOLEDRONIC ACID 5 MG: 5 INJECTION, SOLUTION INTRAVENOUS at 11:45

## 2020-06-24 ENCOUNTER — VIRTUAL VISIT (OUTPATIENT)
Dept: RHEUMATOLOGY | Age: 60
End: 2020-06-24
Payer: MEDICARE

## 2020-06-24 PROCEDURE — 99213 OFFICE O/P EST LOW 20 MIN: CPT | Performed by: INTERNAL MEDICINE

## 2020-06-24 PROCEDURE — G8428 CUR MEDS NOT DOCUMENT: HCPCS | Performed by: INTERNAL MEDICINE

## 2020-06-24 PROCEDURE — 3017F COLORECTAL CA SCREEN DOC REV: CPT | Performed by: INTERNAL MEDICINE

## 2020-06-24 NOTE — PROGRESS NOTES
yes  Dementia: no  Poor health/frailty: no     Potentially modifiable:  Tobacco use: yes -1-1/2 pack/day  Low body weight (<127 lbs): yes  Estrogen deficiency     early menopause (age <45) or bilateral ovariectomy: yes     prolonged premenopausal amenorrhea (>1 yr): no  Low calcium intake (lifelong): no  Alcoholism: no  Recurrent falls: no  Inadequate physical activity: no    Current calcium and Vit D intake:  Dietary sources: Unknown  Supplements: Unknown  Interim history: She presents for follow-up of osteoporosis and osteoarthritis. She received Reclast infusion yesterday. She has not noticed any side effects. She denies recent fragility fractures. She takes calcium and vitamin D. She also has concomitant osteoarthritis. She is on naproxen 375 mg twice a day with improvement in pain. She has osteoarthritis in hands and knees. Blood work came back negative for rheumatoid arthritis. She denies any side effects with naproxen. HPI  Review of Systems    REVIEW OF SYSTEMS: Positive for fatigue, osteoporosis, dry mouth, loss of taste, headaches, excessive worries, anxiety  Constitutional: No unanticipated weight loss or fevers. No fatigue and malaise. Integumentary: No rash, photosensitivity, malar rash, livedo reticularis, alopecia and Raynaud's symptoms, sclerodactyly, skin tightening  Eyes: negative for visual disturbance and persistent redness, discharge from eyes   ENT: - No tinnitus, loss of hearing, vertigo, or recurrent ear infections.  - No history of nasal/oral ulcers. - No history of dry eyes  Cardiovascular: No history of pericarditis, chest pain or murmur or palpitations  Respiratory: No history of interstitial lung disease. No history of pleurisy. No history of tuberculosis or atypical infections. Gastrointestinal: No history of heart burn, dysphagia or esophageal dysmotility. No change in bowel habits or any inflammatory bowel disease.   Genitourinary: No history renal disease, on file     Minutes per session: Not on file    Stress: Not on file   Relationships    Social connections     Talks on phone: Not on file     Gets together: Not on file     Attends Alevism service: Not on file     Active member of club or organization: Not on file     Attends meetings of clubs or organizations: Not on file     Relationship status: Not on file    Intimate partner violence     Fear of current or ex partner: Not on file     Emotionally abused: Not on file     Physically abused: Not on file     Forced sexual activity: Not on file   Other Topics Concern    Not on file   Social History Narrative    Not on file     Family History   Problem Relation Age of Onset    COPD Father     Breast Cancer Sister        PHYSICAL EXAMINATION:  [ INSTRUCTIONS:  \"[x]\" Indicates a positive item  \"[]\" Indicates a negative item  -- DELETE ALL ITEMS NOT EXAMINED]  Vital Signs: (As obtained by patient/caregiver or practitioner observation)    Blood pressure-  Heart rate-    Respiratory rate-    Temperature-  Pulse oximetry-     Constitutional: [x] Appears well-developed and well-nourished [x] No apparent distress      [] Abnormal-   Mental status  [x] Alert and awake  [x] Oriented to person/place/time [x]Able to follow commands      Eyes:  EOM    [x]  Normal  [] Abnormal-  Sclera  [x]  Normal  [] Abnormal -         Discharge [x]  None visible  [] Abnormal -    HENT:   [x] Normocephalic, atraumatic.   [] Abnormal   [x] Mouth/Throat: Mucous membranes are moist.     External Ears [x] Normal  [] Abnormal-     Neck: [x] No visualized mass     Pulmonary/Chest: [x] Respiratory effort normal.  [x] No visualized signs of difficulty breathing or respiratory distress        [] Abnormal-      Musculoskeletal:   [x] Normal gait with no signs of ataxia         [x] Normal range of motion of neck        [] Abnormal-multiple [place] nodes      Neurological:        [x] No Facial Asymmetry (Cranial nerve 7 motor function) (limited exam

## 2020-07-07 DIAGNOSIS — M81.0 AGE-RELATED OSTEOPOROSIS WITHOUT CURRENT PATHOLOGICAL FRACTURE: ICD-10-CM

## 2020-07-07 LAB
A/G RATIO: 1.5 (ref 1.1–2.2)
ALBUMIN SERPL-MCNC: 3.9 G/DL (ref 3.4–5)
ALP BLD-CCNC: 138 U/L (ref 40–129)
ALT SERPL-CCNC: 9 U/L (ref 10–40)
ANION GAP SERPL CALCULATED.3IONS-SCNC: 14 MMOL/L (ref 3–16)
AST SERPL-CCNC: 17 U/L (ref 15–37)
BILIRUB SERPL-MCNC: 0.3 MG/DL (ref 0–1)
BUN BLDV-MCNC: 5 MG/DL (ref 7–20)
CALCIUM SERPL-MCNC: 9.1 MG/DL (ref 8.3–10.6)
CHLORIDE BLD-SCNC: 103 MMOL/L (ref 99–110)
CO2: 25 MMOL/L (ref 21–32)
CREAT SERPL-MCNC: 0.6 MG/DL (ref 0.6–1.2)
GFR AFRICAN AMERICAN: >60
GFR NON-AFRICAN AMERICAN: >60
GLOBULIN: 2.6 G/DL
GLUCOSE BLD-MCNC: 109 MG/DL (ref 70–99)
POTASSIUM SERPL-SCNC: 3.8 MMOL/L (ref 3.5–5.1)
SODIUM BLD-SCNC: 142 MMOL/L (ref 136–145)
TOTAL PROTEIN: 6.5 G/DL (ref 6.4–8.2)

## 2020-10-02 ENCOUNTER — HOSPITAL ENCOUNTER (OUTPATIENT)
Dept: WOMENS IMAGING | Age: 60
Discharge: HOME | End: 2020-10-02
Payer: MEDICARE

## 2020-10-02 PROCEDURE — 77067 SCR MAMMO BI INCL CAD: CPT

## 2020-10-12 NOTE — PROGRESS NOTES
endoscopy. Recheck colonoscopy  no  longer on PPI.     Low vitamin D with appropriate supplementation. .  Positive tobacco , 1 ppd , caffeine 5 12 oz,, decrease weight. + osteoporosis.  No fractures.      Hyperlipidemia  down.  States using atorvastatin without difficulty.  No liver function abnormality.  No claudication or TIA symptoms.  No increased exercise intolerance.  No cardiac testing CTA with coronary calcifications.     Multinodular goiter.  Euthyroid. No dysphasia palpitations or stridor.      .        vaginal delivery without complication. No gestational diabetes or hypertension. Tubal ligation. Early menopause. Last Pap  was normal.  Sister with breast cancer.  Mammogram 2019 stable. Is going to call for mammogram.      PMH:    Childbirth     PE  's     pneumonia     Tubal      SH: moved into home and daughter and    + tobacco 4 packs daily down to 1.  No alcohol. No recreational drugs. Retired minimal exercise. .   2017 after prolonged illness.       FH 3 brother 3 sisters    + breast cancer in sister 2 [facility]     -colon, ovarian cancer     ROS: Decreased vision.  Eye exam upcoming 2019.  No concerns with memory.  No falls.  Edentulous.  Chronic shortness of breath and wheeze. Is with pulmonary postnasal drip.  Depression anxiety. Improved.  Insomnia proved. .  No palpitations chest pain claudication or TIA.  No breast masses.  Occasional dyspepsia.  Colonic polyps recheck due . Tiffany Guzmán urinary concerns.  Occasional arthralgias.  Low BMI stable.  Wears sunscreen now.  Safe at home       Constitutional, ent, CV, respiratory, GI, , joint, skin, allergic and psychiatric ROS reviewed and negative except for above    Allergies   Allergen Reactions    Pcn [Penicillins]        Outpatient Medications Marked as Taking for the 10/13/20 encounter (Office Visit) with Danis Finney MD   Medication Sig Dispense Refill    calcium gluconate 500 MG tablet Take 1 tablet by mouth daily 30 tablet 5    atorvastatin (LIPITOR) 80 MG tablet Take 1 tablet by mouth daily 90 tablet 3    vitamin D (ERGOCALCIFEROL) 1.25 MG (74915 UT) CAPS capsule Take 1 capsule by mouth once a week 12 capsule 1    tiotropium (SPIRIVA RESPIMAT) 2.5 MCG/ACT AERS inhaler INHALE 2 PUFFS INTO THE LUNGS EVERY DAY 4 g 2    Fluticasone Furoate-Vilanterol (BREO ELLIPTA) 200-25 MCG/INH AEPB Inhale 1 puff into the lungs daily One puff daily 1 each 11    albuterol sulfate HFA (VENTOLIN HFA) 108 (90 Base) MCG/ACT inhaler Inhale 2 puffs into the lungs every 6 hours as needed for Wheezing 1 Inhaler 3             Past Medical History:   Diagnosis Date    Abnormal screening mammogram 7/31/2017    Dx right pending    Anxiety     Arthritis     Asthma     Colon polyps 10/30/2017    Multiple path pending 10.2017  Recheck 10.2020    Depression     Headache 8/21/2019    Multinodular thyroid 7/25/2017 7.2017 euthyroid recheck 1 year    Other osteoporosis without current pathological fracture 2/21/2020 2.2020  T-score of  -3.8 and a Z-score of -2.3.  spine; hip -3.0 and a Z-score of -1.7       Past Surgical History:   Procedure Laterality Date    TONSILLECTOMY      TUBAL LIGATION               Family History   Problem Relation Age of Onset    COPD Father     Breast Cancer Sister            Review of Systems        Objective     /74   Pulse 93   Resp 16   Ht 5' 5.5\" (1.664 m)   Wt 114 lb (51.7 kg)   SpO2 97% Comment: RA  BMI 18.68 kg/m²     @LASTSAO2(3)@    Wt Readings from Last 3 Encounters:   06/16/20 110 lb (49.9 kg)   05/11/20 114 lb 12.8 oz (52.1 kg)   03/11/20 115 lb (52.2 kg)       Physical Exam     NAD alert and cooperative  HEENT: Edentulous. Dry mouth. No oral ulcers. Good upstroke of the carotid. Thyromegaly. No tenderness. 1 cm soft tissue nodule left anterior neck. No bruits. Lungs significantly diminished breath sounds.   I detect no wheezes rales or rhonchi. Cardiovascular exam regular rate and rhythm without any murmur click. Abdomen scaphoid. No hepatosplenomegaly or epigastric tenderness. Excellent pulses lower extremities. No peripheral edema. Decreased subcutaneous fat. Mood is appropriate well-groomed. Good eye contact. Chemistry        Component Value Date/Time     07/07/2020 1127    K 3.8 07/07/2020 1127     07/07/2020 1127    CO2 25 07/07/2020 1127    BUN 5 (L) 07/07/2020 1127    CREATININE 0.6 07/07/2020 1127        Component Value Date/Time    CALCIUM 9.1 07/07/2020 1127    ALKPHOS 138 (H) 07/07/2020 1127    AST 17 07/07/2020 1127    ALT 9 (L) 07/07/2020 1127    BILITOT 0.3 07/07/2020 1127            Lab Results   Component Value Date    WBC 4.4 03/11/2020    HGB 12.5 03/11/2020    HCT 36.8 03/11/2020    MCV 96.2 03/11/2020     03/11/2020     Lab Results   Component Value Date    LABA1C 5.4 07/20/2017     Lab Results   Component Value Date    .3 07/20/2017     Lab Results   Component Value Date    LABA1C 5.4 07/20/2017     No components found for: CHLPL  Lab Results   Component Value Date    TRIG 130 11/13/2019    TRIG 136 07/24/2018    TRIG 105 02/22/2018     Lab Results   Component Value Date    HDL 61 (H) 11/13/2019    HDL 47 08/21/2019    HDL 53 07/24/2018     Lab Results   Component Value Date    LDLCALC 172 (H) 11/13/2019    LDLCALC 149 (H) 08/21/2019    LDLCALC 160 (H) 07/24/2018     Lab Results   Component Value Date    LABVLDL 26 11/13/2019    LABVLDL 33 08/21/2019    LABVLDL 27 07/24/2018       Old labs and records reviewed or requested  Discussed past lab and studies with patient      Diagnosis Orders   1. Familial hypercholesterolemia     2. Need for influenza vaccination  INFLUENZA, QUADV, 3 YRS AND OLDER, IM PF, PREFILL SYR OR SDV, 0.5ML (AFLURIA QUADV, PF)   3. Multinodular thyroid     4. Hx of adenomatous polyp of colon     5. FH: breast cancer in first degree relative     6.  Panlobular emphysema ([facility]     7. Vitamin D deficiency     8. Tobacco dependence     9. Other osteoporosis without current pathological fracture       Hyperlipidemia. Lipid profile today. Osteoporosis following with rheumatology. Vitamin D supplementation Reclast.    History adenomatous polyp. Due colonoscopy. Will be calling. Family history of breast cancer. Due mammogram.  Referral given. Vitamin D deficiency supplemented. Tobacco dependence. Discussed cessation not motivated for this at this time. Osteoporosis mentioned above    Grieving doing better. Insomnia doing better. Multinodular goiter. Stable. Return in about 6 months (around 4/13/2021). Diagnosis and treatment discussed.   Possible side effects of medication reviewed  Patients questions answered  Follow up understood  Pt aware if they are not contacted about any test results , this does not mean they are normal.  They should call

## 2020-10-13 ENCOUNTER — OFFICE VISIT (OUTPATIENT)
Dept: FAMILY MEDICINE CLINIC | Age: 60
End: 2020-10-13
Payer: MEDICARE

## 2020-10-13 VITALS
HEART RATE: 93 BPM | WEIGHT: 114 LBS | HEIGHT: 66 IN | SYSTOLIC BLOOD PRESSURE: 126 MMHG | BODY MASS INDEX: 18.32 KG/M2 | OXYGEN SATURATION: 97 % | RESPIRATION RATE: 16 BRPM | DIASTOLIC BLOOD PRESSURE: 74 MMHG

## 2020-10-13 LAB
CHOLESTEROL, TOTAL: 234 MG/DL (ref 0–199)
HDLC SERPL-MCNC: 49 MG/DL (ref 40–60)
LDL CHOLESTEROL CALCULATED: 155 MG/DL
TRIGL SERPL-MCNC: 148 MG/DL (ref 0–150)
VITAMIN D 25-HYDROXY: 75.6 NG/ML
VLDLC SERPL CALC-MCNC: 30 MG/DL

## 2020-10-13 PROCEDURE — 3017F COLORECTAL CA SCREEN DOC REV: CPT | Performed by: INTERNAL MEDICINE

## 2020-10-13 PROCEDURE — 36415 COLL VENOUS BLD VENIPUNCTURE: CPT | Performed by: INTERNAL MEDICINE

## 2020-10-13 PROCEDURE — G8427 DOCREV CUR MEDS BY ELIG CLIN: HCPCS | Performed by: INTERNAL MEDICINE

## 2020-10-13 PROCEDURE — 4004F PT TOBACCO SCREEN RCVD TLK: CPT | Performed by: INTERNAL MEDICINE

## 2020-10-13 PROCEDURE — G8482 FLU IMMUNIZE ORDER/ADMIN: HCPCS | Performed by: INTERNAL MEDICINE

## 2020-10-13 PROCEDURE — G8420 CALC BMI NORM PARAMETERS: HCPCS | Performed by: INTERNAL MEDICINE

## 2020-10-13 PROCEDURE — 3023F SPIROM DOC REV: CPT | Performed by: INTERNAL MEDICINE

## 2020-10-13 PROCEDURE — G0008 ADMIN INFLUENZA VIRUS VAC: HCPCS | Performed by: INTERNAL MEDICINE

## 2020-10-13 PROCEDURE — G8926 SPIRO NO PERF OR DOC: HCPCS | Performed by: INTERNAL MEDICINE

## 2020-10-13 PROCEDURE — 90686 IIV4 VACC NO PRSV 0.5 ML IM: CPT | Performed by: INTERNAL MEDICINE

## 2020-10-13 PROCEDURE — 99214 OFFICE O/P EST MOD 30 MIN: CPT | Performed by: INTERNAL MEDICINE

## 2020-10-13 PROCEDURE — 82306 VITAMIN D 25 HYDROXY: CPT | Performed by: INTERNAL MEDICINE

## 2020-10-13 RX ORDER — ASPIRIN 81 MG/1
81 TABLET ORAL DAILY
Qty: 90 TABLET | Refills: 3 | Status: SHIPPED | OUTPATIENT
Start: 2020-10-13 | End: 2021-07-14

## 2020-10-13 RX ORDER — ERGOCALCIFEROL 1.25 MG/1
CAPSULE ORAL
Qty: 6 CAPSULE | Refills: 3 | Status: SHIPPED | OUTPATIENT
Start: 2020-10-13 | End: 2022-03-03

## 2020-10-13 NOTE — PATIENT INSTRUCTIONS
Tdap, shingles     Call for colonoscopy. Try and increase your supplement boost Sustacal or Ensure. Please that you are doing well and safe at home. Follow-up 6 to 12 months.

## 2020-10-16 RX ORDER — EZETIMIBE 10 MG/1
10 TABLET ORAL DAILY
Qty: 90 TABLET | Refills: 0 | Status: SHIPPED | OUTPATIENT
Start: 2020-10-16

## 2020-12-16 ENCOUNTER — OFFICE VISIT (OUTPATIENT)
Dept: PULMONOLOGY | Age: 60
End: 2020-12-16
Payer: MEDICARE

## 2020-12-16 VITALS
HEIGHT: 66 IN | RESPIRATION RATE: 16 BRPM | SYSTOLIC BLOOD PRESSURE: 120 MMHG | TEMPERATURE: 97.7 F | DIASTOLIC BLOOD PRESSURE: 76 MMHG | OXYGEN SATURATION: 97 % | BODY MASS INDEX: 18.71 KG/M2 | WEIGHT: 116.4 LBS | HEART RATE: 96 BPM

## 2020-12-16 PROCEDURE — 3017F COLORECTAL CA SCREEN DOC REV: CPT | Performed by: INTERNAL MEDICINE

## 2020-12-16 PROCEDURE — G8427 DOCREV CUR MEDS BY ELIG CLIN: HCPCS | Performed by: INTERNAL MEDICINE

## 2020-12-16 PROCEDURE — 99213 OFFICE O/P EST LOW 20 MIN: CPT | Performed by: INTERNAL MEDICINE

## 2020-12-16 PROCEDURE — 3023F SPIROM DOC REV: CPT | Performed by: INTERNAL MEDICINE

## 2020-12-16 PROCEDURE — 4004F PT TOBACCO SCREEN RCVD TLK: CPT | Performed by: INTERNAL MEDICINE

## 2020-12-16 PROCEDURE — G8482 FLU IMMUNIZE ORDER/ADMIN: HCPCS | Performed by: INTERNAL MEDICINE

## 2020-12-16 PROCEDURE — G8420 CALC BMI NORM PARAMETERS: HCPCS | Performed by: INTERNAL MEDICINE

## 2020-12-16 PROCEDURE — G8926 SPIRO NO PERF OR DOC: HCPCS | Performed by: INTERNAL MEDICINE

## 2020-12-16 NOTE — PROGRESS NOTES
Chief complaint  This is a 63y.o. year old female  who comes to see me with a chief complaint of   Chief Complaint   Patient presents with    COPD     f/u COPD       HPI  Here with a cc of COPD. Doing well. On Breo and Spiriva. Minimal use of albuterol. No changes with breathing for the most part. Not sick    Flu shot up to date     Cutting back to about 1/2 ppd of smoking which is a big change. At her worst she was smoking 5 ppd! Past Medical History:   Diagnosis Date    Abnormal screening mammogram 7/31/2017    Dx right pending    Anxiety     Arthritis     Asthma     Colon polyps 10/30/2017    Multiple path pending 10.2017  Recheck 10.2020    Depression     Headache 8/21/2019    Multinodular thyroid 7/25/2017 7.2017 euthyroid recheck 1 year    Other osteoporosis without current pathological fracture 2/21/2020 2.2020  T-score of  -3.8 and a Z-score of -2.3.  spine; hip -3.0 and a Z-score of -1.7       Past Surgical History:   Procedure Laterality Date    TONSILLECTOMY      TUBAL LIGATION         Social History     Socioeconomic History    Marital status:       Spouse name: Not on file    Number of children: Not on file    Years of education: Not on file    Highest education level: Not on file   Occupational History    Not on file   Social Needs    Financial resource strain: Not on file    Food insecurity     Worry: Not on file     Inability: Not on file    Transportation needs     Medical: Not on file     Non-medical: Not on file   Tobacco Use    Smoking status: Current Every Day Smoker     Packs/day: 0.50     Years: 50.00     Pack years: 25.00     Types: Cigarettes    Smokeless tobacco: Never Used    Tobacco comment: cutting back   Substance and Sexual Activity    Alcohol use: No    Drug use: No    Sexual activity: Yes     Partners: Male   Lifestyle    Physical activity     Days per week: Not on file     Minutes per session: Not on file    Stress: Not on file   Relationships    Social connections     Talks on phone: Not on file     Gets together: Not on file     Attends Sikh service: Not on file     Active member of club or organization: Not on file     Attends meetings of clubs or organizations: Not on file     Relationship status: Not on file    Intimate partner violence     Fear of current or ex partner: Not on file     Emotionally abused: Not on file     Physically abused: Not on file     Forced sexual activity: Not on file   Other Topics Concern    Not on file   Social History Narrative    Not on file       Family History   Problem Relation Age of Onset    COPD Father     Breast Cancer Sister          Current Outpatient Medications:     tiotropium (SPIRIVA RESPIMAT) 2.5 MCG/ACT AERS inhaler, INHALE 2 PUFFS INTO THE LUNGS EVERY DAY, Disp: 3 Inhaler, Rfl: 1    Fluticasone furoate-vilanterol (BREO ELLIPTA) 200-25 MCG/INH AEPB inhaler, Inhale 1 puff into the lungs daily One puff daily, Disp: 3 each, Rfl: 1    ezetimibe (ZETIA) 10 MG tablet, Take 1 tablet by mouth daily In addition to atorvastatin for cholesterol, Disp: 90 tablet, Rfl: 0    vitamin D (ERGOCALCIFEROL) 1.25 MG (98487 UT) CAPS capsule, 1 po q 2 weeks, Disp: 6 capsule, Rfl: 3    aspirin 81 MG EC tablet, Take 1 tablet by mouth daily, Disp: 90 tablet, Rfl: 3    naproxen (NAPROSYN) 375 MG tablet, Take 1 tablet by mouth 2 times daily (with meals), Disp: 60 tablet, Rfl: 5    calcium gluconate 500 MG tablet, Take 1 tablet by mouth daily, Disp: 30 tablet, Rfl: 5    atorvastatin (LIPITOR) 80 MG tablet, Take 1 tablet by mouth daily, Disp: 90 tablet, Rfl: 3    albuterol sulfate HFA (VENTOLIN HFA) 108 (90 Base) MCG/ACT inhaler, Inhale 2 puffs into the lungs every 6 hours as needed for Wheezing, Disp: 1 Inhaler, Rfl: 3    zoledronic acid (RECLAST) 5 MG/100ML SOLN, Infuse 100 mLs intravenously once for 1 dose, Disp: 100 mL, Rfl: 0    PHYSICAL EXAM:  Vitals:    12/16/20 0747   BP: 120/76   Pulse: 96   Resp: 16   Temp: 97.7 °F (36.5 °C)   SpO2: 97%       GENERAL:  Thin, NAD  HEENT:  No scleral icterus, no conjunctival irritation  NECK:  No thyromegaly, no bruits  LYMPH:  No cervical or supraclavicular adenopathy  HEART:  Regular, rate. no murmurs  LUNGS:  Clear and diminished   ABDOMEN:  No distention, no organomegaly  EXTREMITIES:  No edema, no digital clubbing  NEURO:  No localizing deficits, CN II-XII intact    Pulmonary Function Testing  3/2018  My interpretation is severe obstruction with hyperinflation and reduced diffusing capacity. Bronchodilator response    Chest imaging from 2/2020 is reviewed. My interpretation is emphysema and nodules. Calcifications    ECHO  Nothing recent    Alpha-1 Anti-trypsin levels:  113, Phenotype:  M1S       Lab Results   Component Value Date    WBC 4.4 03/11/2020    HGB 12.5 03/11/2020    HCT 36.8 03/11/2020    MCV 96.2 03/11/2020     03/11/2020       No results found for: BNP    Lab Results   Component Value Date    CREATININE 0.6 07/07/2020    BUN 5 (L) 07/07/2020     07/07/2020    K 3.8 07/07/2020     07/07/2020    CO2 25 07/07/2020     I reviewed the above labs and images    COPD Assessment Test    1. I never cough vs I cough all the time:  3  2. I have no phlegm vs I am completely full of phlegm. 3  3. My chest does not feel tight vs my chest feel vs tight. 2  4. Not breathless with inclines vs breathless with inclines. 3  5. Not limited with ADLs vs Very limited with ADLs. 2  6. Confidence leaving home vs no confidence. 2  7. I sleep soundly vs I don't sleep soundly. 3  8. I have lots of energy vs I have no energy. 3    TOTAL POINTS:  21    Scoring:    A) >30. Very high impact on quality of life. Optimize therapy including rehab  B) >20. High impact on quality of life. C) 10-20. Medium impact on qualify of life  D) <10. Low impact on life  E)  5.  Upper limit of normal in health non-smoker      Assessment/Plan:  1. COPD, severe ([address]  Stable consider her active smoking. Continue with triple inhalers  - tiotropium (SPIRIVA RESPIMAT) 2.5 MCG/ACT AERS inhaler; INHALE 2 PUFFS INTO THE LUNGS EVERY DAY  Dispense: 3 Inhaler; Refill: 1  - Fluticasone furoate-vilanterol (BREO ELLIPTA) 200-25 MCG/INH AEPB inhaler; Inhale 1 puff into the lungs daily One puff daily  Dispense: 3 each; Refill: 1    2. Centrilobular emphysema ([address]    3. Personal history of tobacco use  CT due 2/2021  - CT Lung Screen (Annual); Future      Follow up in 6 months    Pulmonary Rehab:  Declined in the past    Lung Cancer Screening CT:  Up to date as of 2/2020    Low Dose CT (LDCT) Lung Screening criteria met   Age 50-69   Pack year smoking >30   Still smoking or less than 15 year since quit   No sign or symptoms of lung cancer   > 11 months since last LDCT     Risks and benefits of lung cancer screening with LDCT scans discussed:    Significance of positive screen - False-positive LDCT results often occur. 95% of all positive results do not lead to a diagnosis of cancer. Usually further imaging can resolve most false-positive results; however, some patients may require invasive procedures. Over diagnosis risk - 10% to 12% of screen-detected lung cancer cases are over diagnosed--that is, the cancer would not have been detected in the patient's lifetime without the screening. Need for follow up screens annually to continue lung cancer screening effectiveness     Risks associated with radiation from annual LDCT- Radiation exposure is about the same as for a mammogram, which is about 1/3 of the annual background radiation exposure from everyday life. Starting screening at age 54 is not likely to increase cancer risk from radiation exposure. Patients with comorbidities resulting in life expectancy of < 10 years, or that would preclude treatment of an abnormality identified on CT, should not be screened due to lack of benefit.     To obtain maximal benefit from this screening, smoking cessation and long-term abstinence from smoking is critical

## 2020-12-28 ENCOUNTER — VIRTUAL VISIT (OUTPATIENT)
Dept: RHEUMATOLOGY | Age: 60
End: 2020-12-28
Payer: MEDICARE

## 2020-12-28 PROCEDURE — 99442 PR PHYS/QHP TELEPHONE EVALUATION 11-20 MIN: CPT | Performed by: INTERNAL MEDICINE

## 2020-12-28 NOTE — PROGRESS NOTES
2020   Karen Cao is a 63 y.o. female evaluated via telephone on 2020. Consent:  She and/or health care decision maker is aware that that she may receive a bill for this telephone service, depending on her insurance coverage, and has provided verbal consent to proceed: Yes        I affirm this is a Patient Initiated Episode with a Patient who has not had a related appointment within my department in the past 7 days or scheduled within the next 24 hours. Patient identification was verified at the start of the visit: Yes    Total Time: minutes: 11-20 minutes    Note: not billable if this call serves to triage the patient into an appointment for the relevant concern      Marcelino Jimenez   Patient Name: Karen Cao  : 1960  Medical Record: [id number]    MEDICATIONS  Current Outpatient Medications   Medication Sig Dispense Refill    tiotropium (SPIRIVA RESPIMAT) 2.5 MCG/ACT AERS inhaler INHALE 2 PUFFS INTO THE LUNGS EVERY DAY 3 Inhaler 1    Fluticasone furoate-vilanterol (BREO ELLIPTA) 200-25 MCG/INH AEPB inhaler Inhale 1 puff into the lungs daily One puff daily 3 each 1    ezetimibe (ZETIA) 10 MG tablet Take 1 tablet by mouth daily In addition to atorvastatin for cholesterol 90 tablet 0    vitamin D (ERGOCALCIFEROL) 1.25 MG (22661 UT) CAPS capsule 1 po q 2 weeks 6 capsule 3    aspirin 81 MG EC tablet Take 1 tablet by mouth daily 90 tablet 3    calcium gluconate 500 MG tablet Take 1 tablet by mouth daily 30 tablet 5    atorvastatin (LIPITOR) 80 MG tablet Take 1 tablet by mouth daily 90 tablet 3    albuterol sulfate HFA (VENTOLIN HFA) 108 (90 Base) MCG/ACT inhaler Inhale 2 puffs into the lungs every 6 hours as needed for Wheezing 1 Inhaler 3    zoledronic acid (RECLAST) 5 MG/100ML SOLN Infuse 100 mLs intravenously once for 1 dose 100 mL 0     No current facility-administered medications for this visit.         ALLERGIES  Allergies   Allergen Reactions    Pcn [Penicillins] Comments  No specialty comments available. Background history:  Jarocho Armijo is a 63 y.o. female who is being seen for follow up evaluation of  osteoporosis and osteoarthritis. He had a DEXA scan on 2/20/2020 which showed T score of -3.8 at the lumbar spine, -3 at right and left femoral neck. She denies any history of fragility fractures. She denies any family history of osteoporosis. She denies any family history of hip fractures. She smokes 1-1/2 packs of cigarettes a day. She has never been on treatment for osteoporosis. She does not drink alcohol. She denies any back pain. She takes vitamin D 50,000 once a week. He also has pain, stiffness in joints which is worse in her right knee and right hand. Right knee cracks and pops. She has pain in the right knee going up and down the steps, standing from the sitting position. She has difficulty opening doorknobs and jars. She denies any swelling in the joints. She has morning stiffness lasting for few minutes. She has difficulty holding things with the hand. She denies any family history of rheumatoid arthritis. She takes Tylenol or over-the-counter Aleve as needed with some benefit. Osteoporosis Risk Factors   Nonmodifiable  Personal Hx of fracture as an adult: no  Hx of fracture in first-degree relative: no   race: yes  Advanced age: no  Female sex: yes  Dementia: no  Poor health/frailty: no     Potentially modifiable:  Tobacco use: yes -1-1/2 pack/day  Low body weight (<127 lbs): yes  Estrogen deficiency     early menopause (age <45) or bilateral ovariectomy: yes     prolonged premenopausal amenorrhea (>1 yr): no  Low calcium intake (lifelong): no  Alcoholism: no  Recurrent falls: no  Inadequate physical activity: no    Current calcium and Vit D intake:  Dietary sources: Unknown  Supplements: Unknown  Interim history: She presents for follow-up of osteoporosis and osteoarthritis.   She received Reclast infusion on June 23, 2020. She has not noticed any side effects. She denies recent fragility fractures. She takes calcium and vitamin D. She also has concomitant osteoarthritis. She has osteoarthritis in hands and knees. She denies any daily joint pain or swelling or stiffness. She has not required to take naproxen recently. Blood work came back negative for rheumatoid arthritis. She denies any side effects with naproxen. HPI  Review of Systems    REVIEW OF SYSTEMS: Positive for fatigue, osteoporosis, dry mouth, loss of taste, headaches, excessive worries, anxiety  Constitutional: No unanticipated weight loss or fevers. No fatigue and malaise. Integumentary: No rash, photosensitivity, malar rash, livedo reticularis, alopecia and Raynaud's symptoms, sclerodactyly, skin tightening  Eyes: negative for visual disturbance and persistent redness, discharge from eyes   ENT: - No tinnitus, loss of hearing, vertigo, or recurrent ear infections.  - No history of nasal/oral ulcers. - No history of dry eyes  Cardiovascular: No history of pericarditis, chest pain or murmur or palpitations  Respiratory: No history of interstitial lung disease. No history of pleurisy. No history of tuberculosis or atypical infections. Gastrointestinal: No history of heart burn, dysphagia or esophageal dysmotility. No change in bowel habits or any inflammatory bowel disease. Genitourinary: No history renal disease, miscarriages. Hematologic/Lymphatic: No abnormal bruising or bleeding, blood clots or swollen lymph nodes. Neurological: No history of seizure or focal weakness. No history of neuropathies, paresthesias or hyperesthesias, facial droop, diplopia  Psychiatric: No history of bipolar disease, depression  Endocrine: Denies any polyuria, polydipsia  Allergic/Immunologic: No nasal congestion or hives.         I have reviewed patients Past medical History, Social History and Family History as mentioned in her chart and this remains unchanged fromprevious. Past Medical History:   Diagnosis Date    Abnormal screening mammogram 7/31/2017    Dx right pending    Anxiety     Arthritis     Asthma     Colon polyps 10/30/2017    Multiple path pending 10.2017  Recheck 10.2020    Depression     Headache 8/21/2019    Multinodular thyroid 7/25/2017 7.2017 euthyroid recheck 1 year    Other osteoporosis without current pathological fracture 2/21/2020 2.2020  T-score of  -3.8 and a Z-score of -2.3.  spine; hip -3.0 and a Z-score of -1.7     Past Surgical History:   Procedure Laterality Date    TONSILLECTOMY      TUBAL LIGATION       Social History     Socioeconomic History    Marital status:       Spouse name: Not on file    Number of children: Not on file    Years of education: Not on file    Highest education level: Not on file   Occupational History    Not on file   Social Needs    Financial resource strain: Not on file    Food insecurity     Worry: Not on file     Inability: Not on file    Transportation needs     Medical: Not on file     Non-medical: Not on file   Tobacco Use    Smoking status: Current Every Day Smoker     Packs/day: 0.50     Years: 50.00     Pack years: 25.00     Types: Cigarettes    Smokeless tobacco: Never Used    Tobacco comment: cutting back   Substance and Sexual Activity    Alcohol use: No    Drug use: No    Sexual activity: Yes     Partners: Male   Lifestyle    Physical activity     Days per week: Not on file     Minutes per session: Not on file    Stress: Not on file   Relationships    Social connections     Talks on phone: Not on file     Gets together: Not on file     Attends Restoration service: Not on file     Active member of club or organization: Not on file     Attends meetings of clubs or organizations: Not on file     Relationship status: Not on file    Intimate partner violence     Fear of current or ex partner: Not on file     Emotionally abused: Not on file     Physically abused: Not on file     Forced sexual activity: Not on file   Other Topics Concern    Not on file   Social History Narrative    Not on file     Family History   Problem Relation Age of Onset    COPD Father     Breast Cancer Sister        LABS AND IMAGING  Outside data reviewed and in HPI    Lab Results   Component Value Date    WBC 4.4 03/11/2020    RBC 3.83 03/11/2020    HGB 12.5 03/11/2020    HCT 36.8 03/11/2020     03/11/2020    MCV 96.2 03/11/2020    MCH 32.7 03/11/2020    MCHC 34.0 03/11/2020    RDW 14.2 03/11/2020    SEGSPCT 63.2 05/23/2013    LYMPHOPCT 30.6 02/22/2018    MONOPCT 8.7 02/22/2018    BASOPCT 0.9 02/22/2018    MONOSABS 0.5 02/22/2018    LYMPHSABS 1.8 02/22/2018    EOSABS 0.1 02/22/2018    BASOSABS 0.1 02/22/2018    DIFFTYPE Auto-K 05/23/2013       Chemistry        Component Value Date/Time     07/07/2020 1127    K 3.8 07/07/2020 1127     07/07/2020 1127    CO2 25 07/07/2020 1127    BUN 5 (L) 07/07/2020 1127    CREATININE 0.6 07/07/2020 1127        Component Value Date/Time    CALCIUM 9.1 07/07/2020 1127    ALKPHOS 138 (H) 07/07/2020 1127    AST 17 07/07/2020 1127    ALT 9 (L) 07/07/2020 1127    BILITOT 0.3 07/07/2020 1127          Lab Results   Component Value Date    SEDRATE 18 05/11/2020     Lab Results   Component Value Date    CRP <0.3 05/11/2020     No results found for: SANDRA, JHONNY, SSA, SSB, C3, C4  Lab Results   Component Value Date    RF 26.0 05/11/2020     No results found for: SANDRA, ANATITER, ANAINT, PATH  No results found for: DSDNAG, DSDNAIGGIFA  No results found for: SSAROAB, SSALAAB  No results found for: SMAB, RNPAB  No results found for: CENTABIGG  No results found for: C3, C4, ACE  Lab Results   Component Value Date    VITD25 75.6 10/13/2020     No results found for: Carloyn Endeavor  No results found for: Юлия Aguirre  Lab Results   Component Value Date    TSHFT4 1.11 03/11/2020    TSH 1.45 03/13/2015     Lab Results   Component Value Date    VITD25 75.6 10/13/2020 Radiology:    EXAMINATION:   BONE DENSITOMETRY       2/20/2020 7:50 am       TECHNIQUE:   A bone density DEXA scan was performed of the lumbar spine and bilateral hips   on a Hologic system.       COMPARISON:   None.       HISTORY:   ORDERING SYSTEM PROVIDED HISTORY: Bone disorder       FINDINGS:   LUMBAR SPINE:       The bone mineral density in the lumbar spine including the L1 thru L4 levels   is measured at 0.629 g/cm2, which corresponds to a T-score of  -3.8 and a   Z-score of -2.3.  This is within the osteoporosis range by WHO criteria.       LEFT HIP:       The bone mineral density in the total hip is measured at 0.613 g/cm2   corresponding to a T-score of -2.7 and a Z-score of -1.7.  This is within the   osteoporosis range by [facility] criteria.       The bone mineral density of the femoral neck is measured at 0.515 g/cm2   corresponding to a T-score of -3.0 and a Z-score of -1.7.  This is within the   osteoporosis range by WHO criteria.       RIGHT HIP:       The bone mineral density in the total hip is measured at 0.596 g/cm2   corresponding to a T-score of -2.8 and a Z-score of -1.8.  This is within the   osteoporosis range by [facility] criteria.       The bone mineral density of the femoral neck is measured at 0.519 g/cm2   corresponding to a T-score of -3.0 and a Z-score of -1.6.  This is within the   osteoporosis range by [facility] criteria.           Impression   Osteoporosis by WHO criteria.           5/12/2020     FINDINGS:   Left knee:       The bones are intact.  The joint spaces are preserved.  No joint effusion is   seen.       Left hand:       The bones are intact.  Mild osteoarthritis 1st carpometacarpal joint is   present.  No erosive changes are seen.  No chondrocalcinosis is noted.       Right knee:       The bones are intact.  The joint spaces are preserved.  No joint effusion is   seen.  Minimal atherosclerotic vascular calcifications are present.       Right hand:       The bones are intact.  Minimal degenerative change 1st carpometacarpal joint   is present.  No marginal erosions are seen.  No chondrocalcinosis is noted. Mild degenerative spurring at the DIP joint right index finger is noted.           Impression   No acute osseous injury of either knee or hand.       Osteoarthritis of both hands as noted above.             ASSESSMENT AND PLAN      Assessment/Plan:      ASSESSMENT:    1. Age-related osteoporosis without current pathological fracture    2. Primary osteoarthritis involving multiple joints        PLAN:   1. Age-related osteoporosis without current pathological fracture  DEXA scan with T score of -3.8 at the lumbar spine, -3 at the right and left femoral neck. No history of fragility fractures. No family history of fractures. TSH, PTH vitamin D normal  Received Reclast infusion June 23, 2020 no side effects, no recent fragility fractures. Repeat Reclast infusion on June 23, 2021  - Comprehensive Metabolic Panel; Future    2. Primary osteoarthritis involving multiple joints  RF, CCP negative, hand and knee x-rays with degenerative changes. Doubt rheumatoid arthritis  Stable  Naproxen as needed  Osteoarthritis/ Joint pain : Discussed about diagnosis and management of osteoarthritis. There are no FDA approved disease modifying agents. Goal is to control pain and increase mobility. Discussed the importance of wt loss, exercises, formal PT, joint braces/splints. First line of agent Tylenol arthritis, NSAIDs systemic and/ or topical,Cymbalta, pain meds, joint injections- steroids, and visco supplementaiton for knee OA. Also discussed about surgical options. The patient indicates understanding of these issues and agrees with the plan. Return in about 6 months (around 6/28/2021). The risks and benefits of my recommendations, as well as other treatment options, benefits and side effects werediscussed with the patient. All questions were answered.

## 2020-12-29 ENCOUNTER — HOSPITAL ENCOUNTER (OUTPATIENT)
Age: 60
Discharge: HOME | End: 2020-12-29
Payer: MEDICARE

## 2020-12-29 ENCOUNTER — HOSPITAL ENCOUNTER (OUTPATIENT)
Dept: GENERAL RADIOLOGY | Age: 60
Discharge: HOME | End: 2020-12-29
Payer: MEDICARE

## 2020-12-29 ENCOUNTER — TELEPHONE (OUTPATIENT)
Dept: FAMILY MEDICINE CLINIC | Age: 60
End: 2020-12-29

## 2020-12-29 ENCOUNTER — HOSPITAL ENCOUNTER (OUTPATIENT)
Dept: CT IMAGING | Age: 60
Discharge: HOME | End: 2020-12-29
Payer: MEDICARE

## 2020-12-29 ENCOUNTER — OFFICE VISIT (OUTPATIENT)
Dept: FAMILY MEDICINE CLINIC | Age: 60
End: 2020-12-29
Payer: MEDICARE

## 2020-12-29 VITALS
RESPIRATION RATE: 16 BRPM | HEIGHT: 66 IN | DIASTOLIC BLOOD PRESSURE: 70 MMHG | WEIGHT: 121 LBS | HEART RATE: 86 BPM | BODY MASS INDEX: 19.44 KG/M2 | OXYGEN SATURATION: 98 % | TEMPERATURE: 98.8 F | SYSTOLIC BLOOD PRESSURE: 116 MMHG

## 2020-12-29 LAB
A/G RATIO: 1.4 (ref 1.1–2.2)
ALBUMIN SERPL-MCNC: 4.2 G/DL (ref 3.4–5)
ALP BLD-CCNC: 114 U/L (ref 40–129)
ALT SERPL-CCNC: 22 U/L (ref 10–40)
ANION GAP SERPL CALCULATED.3IONS-SCNC: 11 MMOL/L (ref 3–16)
AST SERPL-CCNC: 30 U/L (ref 15–37)
BILIRUB SERPL-MCNC: <0.2 MG/DL (ref 0–1)
BUN BLDV-MCNC: 10 MG/DL (ref 7–20)
CALCIUM SERPL-MCNC: 8.9 MG/DL (ref 8.3–10.6)
CHLORIDE BLD-SCNC: 103 MMOL/L (ref 99–110)
CO2: 26 MMOL/L (ref 21–32)
CREAT SERPL-MCNC: 0.6 MG/DL (ref 0.6–1.2)
GFR AFRICAN AMERICAN: >60
GFR AFRICAN AMERICAN: >60
GFR NON-AFRICAN AMERICAN: >60
GFR NON-AFRICAN AMERICAN: >60
GLOBULIN: 2.9 G/DL
GLUCOSE BLD-MCNC: 90 MG/DL (ref 70–99)
HCT VFR BLD CALC: 39.3 % (ref 36–48)
HEMOGLOBIN: 13.4 G/DL (ref 12–16)
MCH RBC QN AUTO: 31.7 PG (ref 26–34)
MCHC RBC AUTO-ENTMCNC: 33.9 G/DL (ref 31–36)
MCV RBC AUTO: 93.5 FL (ref 80–100)
PDW BLD-RTO: 12.4 % (ref 12.4–15.4)
PERFORMED ON: NORMAL
PLATELET # BLD: 345 K/UL (ref 135–450)
PMV BLD AUTO: 7.5 FL (ref 5–10.5)
POC CREATININE: 0.6 MG/DL (ref 0.6–1.2)
POC SAMPLE TYPE: NORMAL
POTASSIUM SERPL-SCNC: 4 MMOL/L (ref 3.5–5.1)
RBC # BLD: 4.21 M/UL (ref 4–5.2)
SODIUM BLD-SCNC: 140 MMOL/L (ref 136–145)
TOTAL PROTEIN: 7.1 G/DL (ref 6.4–8.2)
WBC # BLD: 9.1 K/UL (ref 4–11)

## 2020-12-29 PROCEDURE — 85027 COMPLETE CBC AUTOMATED: CPT

## 2020-12-29 PROCEDURE — 82565 ASSAY OF CREATININE: CPT

## 2020-12-29 PROCEDURE — 3017F COLORECTAL CA SCREEN DOC REV: CPT | Performed by: INTERNAL MEDICINE

## 2020-12-29 PROCEDURE — 80053 COMPREHEN METABOLIC PANEL: CPT

## 2020-12-29 PROCEDURE — G8420 CALC BMI NORM PARAMETERS: HCPCS | Performed by: INTERNAL MEDICINE

## 2020-12-29 PROCEDURE — 4004F PT TOBACCO SCREEN RCVD TLK: CPT | Performed by: INTERNAL MEDICINE

## 2020-12-29 PROCEDURE — G8427 DOCREV CUR MEDS BY ELIG CLIN: HCPCS | Performed by: INTERNAL MEDICINE

## 2020-12-29 PROCEDURE — 74177 CT ABD & PELVIS W/CONTRAST: CPT

## 2020-12-29 PROCEDURE — G8482 FLU IMMUNIZE ORDER/ADMIN: HCPCS | Performed by: INTERNAL MEDICINE

## 2020-12-29 PROCEDURE — 36415 COLL VENOUS BLD VENIPUNCTURE: CPT

## 2020-12-29 PROCEDURE — 6360000004 HC RX CONTRAST MEDICATION: Performed by: PHYSICIAN ASSISTANT

## 2020-12-29 PROCEDURE — 99214 OFFICE O/P EST MOD 30 MIN: CPT | Performed by: INTERNAL MEDICINE

## 2020-12-29 PROCEDURE — 71101 X-RAY EXAM UNILAT RIBS/CHEST: CPT

## 2020-12-29 RX ADMIN — IOPAMIDOL 75 ML: 755 INJECTION, SOLUTION INTRAVENOUS at 13:28

## 2020-12-29 NOTE — PATIENT INSTRUCTIONS
CT today. Consider swallow study. Order is in.   Have laboratories at the hospital today so they will be available for CT

## 2020-12-29 NOTE — PROGRESS NOTES
HPI: Timmy Silver presents for left upper abdominal pain post Heimlich. Health issues include grief, emphysema, insomnia, adenomatous polyp with recheck due in 2020, hyperlipidemia, goiter, tobacco addiction,  osteoporosis. intermittent dysphagia. Pill was caught in her throat last evening. Daughter gave her Heimlich x3 without improvement. Patient placed her fingers down her throat and dislodge the pill. Scratch the back of her throat. Emesis with blood. No recurrent issues. No black stools. Has not had solids since. Drinking Ensure. Has history of choking episodes at home. Dry mouth. Multinodular goiter. History dyspepsia. Positive tobacco history has left upper quadrant pain and crepitus. Pain along the ribs and in the upper left abdomen. No change in stools. No recurrent vomiting. Osteoporosis. BMI of 19.       Osteoporosis. Positive vitamin D supplementation. Reclast injection June 2020 without adverse effects. DEXA scan February 2020 with T score of -3.8 and lumbar -3 in the right and left femoral neck. Normal PTH, thyroid and vitamin D. Positive caffeine, tobacco, low BMI. 1 PPD. Following with rheumatology. Positive osteoarthritis. No longer using nonsteroidal anti-inflammatories. Underweight. Decreased p.o. intake. Starting you supplement daily. Living with daughter and son-in-law. They have been supportive. Make sure she takes her Ensure and medications     Chronic headaches significantly improved.  MRI with microvascular changes only.  No longer using daily Motrin. .       centrolobular emphysema FEV1 of 1.4 in 2017.  Currently back up to 1  packs a day of tobacco.  History of 4 packs a day. Breo and Spiriva and as needed albuterol.  No nebulizer.  Immunized.  Remote history unprovoked pulmonary embolus and transient Coumadin will use.  No family history of coagulopathy or recurrent issues.   CTA May 2017 with multilobular emphysema.  No hemoptysis.  Follows with pulmonary Dr. Benton Toussaint. .  CT screening stable 2020.  Not interested in disability sticker. Rosemarie Car smoking cessation would be of benefit but not interested in discontinuing. Cefeirno Sour been on Wellbutrin in the past.  Not interested in restarting.  Not wanting to be on Chantix.  Smoked when she used the patch         41 years taken off ventilator 2017. Has it of grief doing better.       Adenomatous polyp  and negative upper endoscopy. Recheck colonoscopy  no  longer on PPI.     Low vitamin D with appropriate supplementation. .  Positive tobacco , 1 ppd , caffeine 5 12 oz,, decrease weight. + osteoporosis.  No fractures.      Hyperlipidemia  down.  States using atorvastatin without difficulty.  No liver function abnormality.  No claudication or TIA symptoms.  No increased exercise intolerance.  No cardiac testing CTA with coronary calcifications.     Multinodular goiter.  Euthyroid. No palpitations or stridor.      .        vaginal delivery without complication. No gestational diabetes or hypertension. Tubal ligation. Early menopause. Last Pap  was normal.  Sister with breast cancer.  Mammogram BI-RADS 2020        PMH:  Lily Huff     PE  20's     pneumonia     Tubal      SH: moved into home and daughter and    + tobacco 4 packs daily down to 1.  No alcohol. No recreational drugs. Retired minimal exercise. .   2017 after prolonged illness.       FH 3 brother 3 sisters    + breast cancer in sister 2 mehul GSW     -colon, ovarian cancer     ROS: Decreased vision.  Eye exam upcoming 2019.  No concerns with memory.  No falls.  Edentulous.  Chronic shortness of breath and wheeze. Is with pulmonary postnasal drip.  Depression anxiety. Improved.  Insomnia proved. .  No palpitations chest pain claudication or TIA.  No breast masses.  Occasional dyspepsia.  Colonic polyps recheck due . [place] urinary concerns.  Occasional arthralgias.  Low BMI stable.  Wears sunscreen now.  Safe at home     Constitutional, ent, CV, respiratory, GI, , joint, skin, allergic and psychiatric ROS reviewed and negative except for above    Allergies   Allergen Reactions    Pcn [Penicillins]        Outpatient Medications Marked as Taking for the 12/29/20 encounter (Office Visit) with Danis Finney MD   Medication Sig Dispense Refill    tiotropium (SPIRIVA RESPIMAT) 2.5 MCG/ACT AERS inhaler INHALE 2 PUFFS INTO THE LUNGS EVERY DAY 3 Inhaler 1    Fluticasone furoate-vilanterol (BREO ELLIPTA) 200-25 MCG/INH AEPB inhaler Inhale 1 puff into the lungs daily One puff daily 3 each 1    ezetimibe (ZETIA) 10 MG tablet Take 1 tablet by mouth daily In addition to atorvastatin for cholesterol 90 tablet 0    vitamin D (ERGOCALCIFEROL) 1.25 MG (71824 UT) CAPS capsule 1 po q 2 weeks 6 capsule 3    aspirin 81 MG EC tablet Take 1 tablet by mouth daily 90 tablet 3    zoledronic acid (RECLAST) 5 MG/100ML SOLN Infuse 100 mLs intravenously once for 1 dose 100 mL 0    calcium gluconate 500 MG tablet Take 1 tablet by mouth daily 30 tablet 5    atorvastatin (LIPITOR) 80 MG tablet Take 1 tablet by mouth daily 90 tablet 3    albuterol sulfate HFA (VENTOLIN HFA) 108 (90 Base) MCG/ACT inhaler Inhale 2 puffs into the lungs every 6 hours as needed for Wheezing 1 Inhaler 3             Past Medical History:   Diagnosis Date    Abnormal screening mammogram 7/31/2017    Dx right pending    Anxiety     Arthritis     Asthma     Colon polyps 10/30/2017    Multiple path pending 10.2017  Recheck 10.2020    Depression     Headache 8/21/2019    Multinodular thyroid 7/25/2017 7.2017 euthyroid recheck 1 year    Other osteoporosis without current pathological fracture 2/21/2020 2.2020  T-score of  -3.8 and a Z-score of -2.3.  spine; hip -3.0 and a Z-score of -1.7       Past Surgical History:   Procedure Laterality Date    TONSILLECTOMY      TUBAL LIGATION               Family History Problem Relation Age of Onset    COPD Father     Breast Cancer Sister            Objective     /70   Pulse 86   Temp 98.8 °F (37.1 °C)   Resp 16   Ht 5' 5.5\" (1.664 m)   Wt 121 lb (54.9 kg)   SpO2 98% Comment: RA  BMI 19.83 kg/m²     @LASTSAO2(3)@    Wt Readings from Last 3 Encounters:   12/29/20 121 lb (54.9 kg)   12/16/20 116 lb 6.4 oz (52.8 kg)   10/13/20 114 lb (51.7 kg)       Physical Exam     NAD alert and cooperative  HEENT: Full neck. Dry mouth. No stridor. Goiter. No bruit. Decreased breath sounds. Increased FABIAN ratio. I detect no wheeze or rhonchi. Cardiovascular Jayson regular rate and rhythm without any murmur click. Positive tenderness chest wall left inferior ribs. Significant tenderness left upper quadrant under her ribs. No rebound. No ecchymoses. No mass. Decreased subcutaneous fat. No suspicious skin lesions.   Pale conjunctive a        Chemistry        Component Value Date/Time     07/07/2020 1127    K 3.8 07/07/2020 1127     07/07/2020 1127    CO2 25 07/07/2020 1127    BUN 5 (L) 07/07/2020 1127    CREATININE 0.6 07/07/2020 1127        Component Value Date/Time    CALCIUM 9.1 07/07/2020 1127    ALKPHOS 138 (H) 07/07/2020 1127    AST 17 07/07/2020 1127    ALT 9 (L) 07/07/2020 1127    BILITOT 0.3 07/07/2020 1127            Lab Results   Component Value Date    WBC 4.4 03/11/2020    HGB 12.5 03/11/2020    HCT 36.8 03/11/2020    MCV 96.2 03/11/2020     03/11/2020     Lab Results   Component Value Date    LABA1C 5.4 07/20/2017     Lab Results   Component Value Date    .3 07/20/2017     Lab Results   Component Value Date    LABA1C 5.4 07/20/2017     No components found for: CHLPL  Lab Results   Component Value Date    TRIG 148 10/13/2020    TRIG 130 11/13/2019    TRIG 136 07/24/2018     Lab Results   Component Value Date    HDL 49 10/13/2020    HDL 61 (H) 11/13/2019    HDL 47 08/21/2019     Lab Results   Component Value Date    LDLCALC 155 (H) 10/13/2020    LDLCALC 172 (H) 11/13/2019    LDLCALC 149 (H) 08/21/2019     Lab Results   Component Value Date    LABVLDL 30 10/13/2020    LABVLDL 26 11/13/2019    LABVLDL 33 08/21/2019       Old labs and records reviewed or requested  Discussed past lab and studies with patient    Diagnosis Orders   1. Acute LUQ pain  CT ABDOMEN PELVIS W IV CONTRAST Additional Contrast? Radiologist Recommendation    Comprehensive Metabolic Panel    XR RIBS LEFT INCLUDE CHEST (MIN 3 VIEWS)   2. Abdominal trauma, initial encounter  Comprehensive Metabolic Panel    XR RIBS LEFT INCLUDE CHEST (MIN 3 VIEWS)    CBC   3. Choking, subsequent encounter  FL MODIFIED BARIUM SWALLOW W VIDEO     Acute left upper quadrant pain post Heimlich x3. Significant discomfort in the a.m. for rib area. Concern with splenic injury or rib fracture. Vitals are stable currently. Stat CT scan. Recurrent dysphagia. Barium swallow modified ordered. Tobacco addiction. Continue cutting back on tobacco.    Decreased BMI continue her supplements. Emphysema stable      No follow-ups on file. Diagnosis and treatment discussed.   Possible side effects of medication reviewed  Patients questions answered  Follow up understood  Pt aware if they are not contacted about any test results , this does not mean they are normal.  They should call

## 2020-12-29 NOTE — TELEPHONE ENCOUNTER
Requesting to speak with Dr Ifeanyi Clifotn. Chocked taking a pill last night. Daughter had to do the heimlich maneuver. Today throat and ribs are sore due to this. Has a few questions.   Please call Brenna Philip back at [phone]    Would not provide specific questions to me

## 2020-12-29 NOTE — TELEPHONE ENCOUNTER
I spoke with pt. Pt states her ribs hurt very badly and is concerned about a potential cracked rib. Would you like to see her?  Please advise

## 2021-02-15 ENCOUNTER — TELEPHONE (OUTPATIENT)
Dept: CASE MANAGEMENT | Age: 61
End: 2021-02-15

## 2021-02-19 ENCOUNTER — HOSPITAL ENCOUNTER (OUTPATIENT)
Dept: CT IMAGING | Age: 61
Discharge: HOME | End: 2021-02-19
Payer: MEDICARE

## 2021-02-19 DIAGNOSIS — Z87.891 PERSONAL HISTORY OF TOBACCO USE: ICD-10-CM

## 2021-02-19 PROCEDURE — 71271 CT THORAX LUNG CANCER SCR C-: CPT

## 2021-03-03 ENCOUNTER — IMMUNIZATION (OUTPATIENT)
Dept: PRIMARY CARE CLINIC | Age: 61
End: 2021-03-03
Payer: MEDICARE

## 2021-03-03 PROCEDURE — 0011A COVID-19, MODERNA VACCINE 100MCG/0.5ML DOSE: CPT | Performed by: FAMILY MEDICINE

## 2021-03-03 PROCEDURE — 91301 COVID-19, MODERNA VACCINE 100MCG/0.5ML DOSE: CPT | Performed by: FAMILY MEDICINE

## 2021-03-16 ENCOUNTER — HOSPITAL ENCOUNTER (OUTPATIENT)
Dept: GENERAL RADIOLOGY | Age: 61
Discharge: HOME | End: 2021-03-16
Payer: MEDICARE

## 2021-03-16 ENCOUNTER — HOSPITAL ENCOUNTER (OUTPATIENT)
Dept: SPEECH THERAPY | Age: 61
Setting detail: THERAPIES SERIES
Discharge: HOME | End: 2021-03-16
Payer: MEDICARE

## 2021-03-16 DIAGNOSIS — T17.308D CHOKING, SUBSEQUENT ENCOUNTER: ICD-10-CM

## 2021-03-16 PROCEDURE — 74230 X-RAY XM SWLNG FUNCJ C+: CPT

## 2021-03-16 PROCEDURE — 92526 ORAL FUNCTION THERAPY: CPT

## 2021-03-16 PROCEDURE — 92611 MOTION FLUOROSCOPY/SWALLOW: CPT

## 2021-03-16 NOTE — PROCEDURES
[address] THERAPY  MODIFIED BARIUM SWALLOW EVALUATION    Patient's Name: Jacob Hook. O.B: 1960  Medical Diagnosis: Choking, subsequent encounter [T17.308D]  Treatment Diagnosis: Dysphagia    Ordering MD: Dr. Forest Song  Radiologist: Dr. Masoud Allison  Date of Evaluation: 3/16/2021  Type of Study: Modified Barium Swallowing Study (MBS)  Diet Prior to Study:  Regular texture diet with thin liquids  Pain Level: Pt did not report pain    Pt was referred for a Modified Barium Swallow Study due to incident of choking on a pill in Dec. 2020 and requiring the Heimlich. Pt reported this has not occurred since, however she has not taken that medication. She reported she was taking capsules and putting powder with nutritional supplements, however has since started taking medication with water again. Pt denied difficulty with swallowing food/liquids. Impression:  Modified Barium Swallow evaluation completed on 3/16/2021. Results indicate minimal oropharyngeal dysphagia characterized by prolonged mastication, minimally reduced pharyngeal clearing, and occasional use of 1-2 swallows. Pt tolerated all PO trials with no aspiration or laryngeal penetration viewed with any texture during study. Swallow initiation was timely with all textures with occasional use of 1-2 swallows noted with liquid textures. Occasional mild vallecular stasis was noted post swallow, difficult to determine if texture specific. Prolonged mastication was noted with regular solids with good oral and pharyngeal clearing achieved. A barium tablet was provided, tablet became slightly lodged in vallecular space however Pt able to quickly clear with additional sips of water. Aspiration/Penetration Risk:  No risk identified     Recommendations:    Diet Level: Regular texture diet with thin liquids, meds with puree (whole or crushed) as needed   Strategies: Upright 90 degrees at meals;  Meds with puree as needed  Treatments: No Speech Therapy for dysphagia treatment indicated at this time    Consistencies given: Thin, Mildly Thick (Nectar) Liquids, Puree, Soft solid, Solid    Oral Phase  -Prolonged mastication    Pharyngeal Phase  -Mildly reduced pharyngeal clearing  -Intermittent use of 1-2 swallows  -No aspiration or laryngeal penetration was viewed with any texture during study     Esophageal Phase  Unremarkable    Following Evaluation:  Results/recommendations and education given to Patient who verbalized understanding    Timed Code Treatment: 0 minutes    Total Treatment Time: 25 minutes     Archana Song., [address] [place] [address]  Speech-Language Pathologist

## 2021-03-17 ENCOUNTER — VIRTUAL VISIT (OUTPATIENT)
Dept: FAMILY MEDICINE CLINIC | Age: 61
End: 2021-03-17
Payer: MEDICARE

## 2021-03-17 DIAGNOSIS — E78.01 FAMILIAL HYPERCHOLESTEROLEMIA: ICD-10-CM

## 2021-03-17 DIAGNOSIS — Z86.010 HX OF ADENOMATOUS COLONIC POLYPS: ICD-10-CM

## 2021-03-17 DIAGNOSIS — R10.13 DYSPEPSIA: ICD-10-CM

## 2021-03-17 DIAGNOSIS — Z87.898 HISTORY OF ABNORMAL MAMMOGRAM: ICD-10-CM

## 2021-03-17 DIAGNOSIS — E04.2 MULTINODULAR THYROID: ICD-10-CM

## 2021-03-17 DIAGNOSIS — M81.8 OTHER OSTEOPOROSIS WITHOUT CURRENT PATHOLOGICAL FRACTURE: ICD-10-CM

## 2021-03-17 DIAGNOSIS — J43.1 PANLOBULAR EMPHYSEMA (HCC): ICD-10-CM

## 2021-03-17 DIAGNOSIS — F17.200 TOBACCO DEPENDENCE: ICD-10-CM

## 2021-03-17 DIAGNOSIS — K80.20 CALCULUS OF GALLBLADDER WITHOUT CHOLECYSTITIS WITHOUT OBSTRUCTION: ICD-10-CM

## 2021-03-17 DIAGNOSIS — R13.11 ORAL PHASE DYSPHAGIA: Primary | ICD-10-CM

## 2021-03-17 PROCEDURE — 99213 OFFICE O/P EST LOW 20 MIN: CPT | Performed by: INTERNAL MEDICINE

## 2021-03-17 PROCEDURE — 3017F COLORECTAL CA SCREEN DOC REV: CPT | Performed by: INTERNAL MEDICINE

## 2021-03-17 PROCEDURE — G8427 DOCREV CUR MEDS BY ELIG CLIN: HCPCS | Performed by: INTERNAL MEDICINE

## 2021-03-17 NOTE — PROGRESS NOTES
The below patient isbeing evaluated by a Virtual Visit (video visit) encounter to address concerns as mentioned above. A caregiver was present when appropriate. Due to this being a TeleHealth encounter (During QFAFX-87 public health emergency), evaluation of the following organ systems was limited: Vitals/Constitutional/EENT/Resp/CV/GI//MS/Neuro/Skin/Heme-Lymph-Imm. Pursuant to the emergency declaration under the [address] [place] [address] authority and the [employer] Act, this Virtual Visit was conducted with patient's (and/or legal guardian's) consent, to reduce the patient's risk of exposure to COVID-19 and provide necessary medical care. The patient (and/or legal guardian) has also been advised to contact this office for worsening conditions or problems, and seek emergency medical treatment and/or call 911 if deemed necessary. Patient identification was verified at the start of the visit: Yes    Total time spent on this encounter: Not billed by time    Services were provided through a video synchronous discussion virtually to substitute for in-person clinic visit. Patient and provider were located at their individual homes. --Eddie Laurent MD on 3/17/2021 at 7:20 AM    An electronic signature was used to authenticate this note. HPI: Lisbeth Reynolds presents for follow-up choking episode    Health issues include grief, emphysema, insomnia, adenomatous polyp with recheck due in 2020, hyperlipidemia, goiter, tobacco addiction,  osteoporosis asymptomatic gallstones. Review of CT scan shows gallstones. No symptoms. Was unaware. difficulty with swallowing. History Heimlich maneuver with pill and intermittent choking. .  Multinodular goiter. Positive tobacco.  Modified barium swallow without aspiration. Recommended sitting upright with meals and taking meds with purée or liquid.   She is not had any more difficulty. Treatment recommendations. Feels well with this. .      Notes some increased cough intermittent lightheadedness. Working out with a dog. Playing in the backyard running around the house. Is on her inhalers has not been using her albuterol. No productive sputum or blood in the sputum. Has some cough. No longer on PPI nasal spray. Severe emphysema FEV1 of 1.4 in 2017. Has cut back to tobacco about half a pack a day from 4 packs a day. Positive Breo and Spiriva. No nebulizer. Immunized. Has had her Covid vaccine #1. Remote unprovoked PE. CTA stable 2020 with multi lobar emphysema. Hyperlipidemia without known cardiac disease although calcification of coronary arteries. Is on Zetia and Lipitor      Osteoporosis.  Positive vitamin D supplementation.  Reclast injection June 2020 without adverse effects.  DEXA scan February 2020 with T score of -3.8 and lumbar -3 in the right and left femoral neck.  Normal PTH, thyroid and vitamin D.neg sprue.  Positive caffeine, tobacco, low BMI. 1 PPD.   Following with rheumatology.  Positive osteoarthritis.  No longer using nonsteroidal anti-inflammatories.     Underweight. Decreased p.o. intake. Starting you supplement daily. Living with daughter and son-in-law. They have been supportive. Make sure she takes her Ensure and medications     Chronic headaches significantly improved.  MRI with microvascular changes only.  No longer using daily Motrin. .          41 years taken off ventilator December 2017.  + grief       Adenomatous polyp 2017 and negative upper endoscopy. Recheck colonoscopy 2020      Low vitamin D with appropriate supplementation. .  Positive tobacco , 1 ppd , caffeine 5 12 oz,, decrease weight. + osteoporosis.  No fractures.      Hyperlipidemia  down.  States using atorvastatin without difficulty.  No liver function abnormality.  No claudication or TIA symptoms.  No increased exercise intolerance.  No cardiac testing CTA with coronary calcifications.     Multinodular goiter.  Euthyroid.  No palpitations or stridor.      .        vaginal delivery without complication. No gestational diabetes or hypertension. Tubal ligation. Early menopause. Last Pap  was normal.  Sister with breast cancer.  Mammogram BI-RADS 2020        PMH:  Shaun Kevin     PE  20's     pneumonia     Tubal      SH: moved into home with daughter and    + tobacco 4 packs daily down to 1.  No alcohol.  No recreational drugs.  Retired minimal exercise. .   2017 after prolonged illness.       FH 3 brother 3 sisters    + breast cancer in sister 2 mehul GSW     -colon, ovarian cancer     ROS: Decreased vision.  Eye exam upcoming 2019.  No concerns with memory.  No falls.  Edentulous.  Chronic shortness of breath and wheeze.  Is with pulmonary postnasal drip.  Depression anxiety.  Improved.  Insomnia proved. .  No palpitations chest pain claudication or TIA.  No breast masses.  Occasional dyspepsia.  Colonic polyps recheck due . Mignon Salcedo urinary concerns.  Occasional arthralgias.  Low BMI stable.  Wears sunscreen now.  Safe at home     Constitutional, ent, CV, respiratory, GI, , joint, skin, allergic and psychiatric ROS reviewed and negative except for above    Allergies   Allergen Reactions    Pcn [Penicillins]        Outpatient Medications Marked as Taking for the 3/17/21 encounter (Appointment) with Jayjay Augustin MD   Medication Sig Dispense Refill    tiotropium (SPIRIVA RESPIMAT) 2.5 MCG/ACT AERS inhaler INHALE 2 PUFFS INTO THE LUNGS EVERY DAY 3 Inhaler 1    Fluticasone furoate-vilanterol (BREO ELLIPTA) 200-25 MCG/INH AEPB inhaler Inhale 1 puff into the lungs daily One puff daily 3 each 1    ezetimibe (ZETIA) 10 MG tablet Take 1 tablet by mouth daily In addition to atorvastatin for cholesterol 90 tablet 0    vitamin D (ERGOCALCIFEROL) 1.25 MG (22295 UT) CAPS capsule 1 po q 2 weeks 6 capsule 3    aspirin 81 MG EC tablet Take 1 tablet by mouth daily 90 tablet 3    calcium gluconate 500 MG tablet Take 1 tablet by mouth daily 30 tablet 5    atorvastatin (LIPITOR) 80 MG tablet Take 1 tablet by mouth daily 90 tablet 3    albuterol sulfate HFA (VENTOLIN HFA) 108 (90 Base) MCG/ACT inhaler Inhale 2 puffs into the lungs every 6 hours as needed for Wheezing 1 Inhaler 3             Past Medical History:   Diagnosis Date    Abnormal screening mammogram 7/31/2017    Dx right pending    Anxiety     Arthritis     Asthma     Colon polyps 10/30/2017    Multiple path pending 10.2017  Recheck 10.2020    Depression     Headache 8/21/2019    Multinodular thyroid 7/25/2017 7.2017 euthyroid recheck 1 year    Other osteoporosis without current pathological fracture 2/21/2020 2.2020  T-score of  -3.8 and a Z-score of -2.3.  spine; hip -3.0 and a Z-score of -1.7       Past Surgical History:   Procedure Laterality Date    TONSILLECTOMY      TUBAL LIGATION               Family History   Problem Relation Age of Onset    COPD Father     Breast Cancer Sister          Review of Systems      Chemistry        Component Value Date/Time     12/29/2020 1330    K 4.0 12/29/2020 1330     12/29/2020 1330    CO2 26 12/29/2020 1330    BUN 10 12/29/2020 1330    CREATININE 0.6 12/29/2020 1333    CREATININE 0.6 12/29/2020 1330        Component Value Date/Time    CALCIUM 8.9 12/29/2020 1330    ALKPHOS 114 12/29/2020 1330    AST 30 12/29/2020 1330    ALT 22 12/29/2020 1330    BILITOT <0.2 12/29/2020 1330            NO vitals  as this was a virtual visit during cvod19 pandemic  He appears well. Not dyspneic. Smiling. Good eye contact.       Lab Results   Component Value Date    WBC 9.1 12/29/2020    HGB 13.4 12/29/2020    HCT 39.3 12/29/2020    MCV 93.5 12/29/2020     12/29/2020     Lab Results   Component Value Date    LABA1C 5.4 07/20/2017     Lab Results   Component Value Date    .3 07/20/2017     Lab Results   Component Value Date    LABA1C 5.4 07/20/2017     No components found for: CHLPL  Lab Results   Component Value Date    TRIG 148 10/13/2020    TRIG 130 11/13/2019    TRIG 136 07/24/2018     Lab Results   Component Value Date    HDL 49 10/13/2020    HDL 61 (H) 11/13/2019    HDL 47 08/21/2019     Lab Results   Component Value Date    LDLCALC 155 (H) 10/13/2020    LDLCALC 172 (H) 11/13/2019    LDLCALC 149 (H) 08/21/2019     Lab Results   Component Value Date    LABVLDL 30 10/13/2020    LABVLDL 26 11/13/2019    LABVLDL 33 08/21/2019       Old labs and records reviewed or requested  Discussed past lab and studies with patient      Diagnosis Orders   1. Oral phase dysphagia     2. Calculus of gallbladder without cholecystitis without obstruction     3. Dyspepsia     4. Hx of adenomatous colonic polyps     5. History of abnormal mammogram     6. Familial hypercholesterolemia     7. Multinodular thyroid     8. Other osteoporosis without current pathological fracture     9. Panlobular emphysema ([address]     10. Tobacco dependence       Dysphagia. Doing well. Knows to cope    Asymptomatic gallstones. Discussed symptoms. History adenomatous polyps. Is overdue for repeat screening will contact GI. History abnormal mammogram repeat October 2020 BI-RADS 1. Hyperlipidemia. Taking medication. Table multinodular goiter. Emphysema increased cough will use PPI nasal spray. If not improved follow-up office. No follow-ups on file. Diagnosis and treatment discussed.   Possible side effects of medication reviewed  Patients questions answered  Follow up understood  Pt aware if they are not contacted about any test results , this does not mean they are normal.  They should call

## 2021-03-31 ENCOUNTER — IMMUNIZATION (OUTPATIENT)
Dept: PRIMARY CARE CLINIC | Age: 61
End: 2021-03-31
Payer: MEDICARE

## 2021-03-31 PROCEDURE — 91301 COVID-19, MODERNA VACCINE 100MCG/0.5ML DOSE: CPT | Performed by: FAMILY MEDICINE

## 2021-03-31 PROCEDURE — 0012A COVID-19, MODERNA VACCINE 100MCG/0.5ML DOSE: CPT | Performed by: FAMILY MEDICINE

## 2021-04-05 RX ORDER — ATORVASTATIN CALCIUM 80 MG/1
TABLET, FILM COATED ORAL
Qty: 90 TABLET | Refills: 0 | Status: SHIPPED | OUTPATIENT
Start: 2021-04-05 | End: 2021-06-28

## 2021-04-28 ENCOUNTER — VIRTUAL VISIT (OUTPATIENT)
Dept: FAMILY MEDICINE CLINIC | Age: 61
End: 2021-04-28
Payer: MEDICARE

## 2021-04-28 DIAGNOSIS — J43.1 PANLOBULAR EMPHYSEMA (HCC): ICD-10-CM

## 2021-04-28 DIAGNOSIS — J44.1 COPD EXACERBATION (HCC): Primary | ICD-10-CM

## 2021-04-28 DIAGNOSIS — R13.11 ORAL PHASE DYSPHAGIA: ICD-10-CM

## 2021-04-28 DIAGNOSIS — F17.200 TOBACCO DEPENDENCE: ICD-10-CM

## 2021-04-28 PROCEDURE — G8420 CALC BMI NORM PARAMETERS: HCPCS | Performed by: INTERNAL MEDICINE

## 2021-04-28 PROCEDURE — 3023F SPIROM DOC REV: CPT | Performed by: INTERNAL MEDICINE

## 2021-04-28 PROCEDURE — G8427 DOCREV CUR MEDS BY ELIG CLIN: HCPCS | Performed by: INTERNAL MEDICINE

## 2021-04-28 PROCEDURE — 3017F COLORECTAL CA SCREEN DOC REV: CPT | Performed by: INTERNAL MEDICINE

## 2021-04-28 PROCEDURE — G8926 SPIRO NO PERF OR DOC: HCPCS | Performed by: INTERNAL MEDICINE

## 2021-04-28 PROCEDURE — 4004F PT TOBACCO SCREEN RCVD TLK: CPT | Performed by: INTERNAL MEDICINE

## 2021-04-28 PROCEDURE — 99212 OFFICE O/P EST SF 10 MIN: CPT | Performed by: INTERNAL MEDICINE

## 2021-04-28 RX ORDER — AZITHROMYCIN 250 MG/1
TABLET, FILM COATED ORAL
Qty: 1 PACKET | Refills: 0 | Status: SHIPPED | OUTPATIENT
Start: 2021-04-28 | End: 2021-05-08

## 2021-04-28 RX ORDER — PREDNISONE 20 MG/1
TABLET ORAL
Qty: 15 TABLET | Refills: 0 | Status: SHIPPED | OUTPATIENT
Start: 2021-04-28 | End: 2021-06-21 | Stop reason: ALTCHOICE

## 2021-06-21 ENCOUNTER — OFFICE VISIT (OUTPATIENT)
Dept: PULMONOLOGY | Age: 61
End: 2021-06-21
Payer: MEDICARE

## 2021-06-21 VITALS
HEIGHT: 66 IN | DIASTOLIC BLOOD PRESSURE: 64 MMHG | TEMPERATURE: 96 F | OXYGEN SATURATION: 97 % | RESPIRATION RATE: 16 BRPM | SYSTOLIC BLOOD PRESSURE: 118 MMHG | HEART RATE: 96 BPM | WEIGHT: 107 LBS | BODY MASS INDEX: 17.19 KG/M2

## 2021-06-21 DIAGNOSIS — Z72.0 TOBACCO ABUSE: ICD-10-CM

## 2021-06-21 DIAGNOSIS — J44.1 COPD EXACERBATION (HCC): Primary | ICD-10-CM

## 2021-06-21 DIAGNOSIS — J44.9 COPD, SEVERE (HCC): ICD-10-CM

## 2021-06-21 PROCEDURE — 3017F COLORECTAL CA SCREEN DOC REV: CPT | Performed by: INTERNAL MEDICINE

## 2021-06-21 PROCEDURE — 99214 OFFICE O/P EST MOD 30 MIN: CPT | Performed by: INTERNAL MEDICINE

## 2021-06-21 PROCEDURE — G8926 SPIRO NO PERF OR DOC: HCPCS | Performed by: INTERNAL MEDICINE

## 2021-06-21 PROCEDURE — 3023F SPIROM DOC REV: CPT | Performed by: INTERNAL MEDICINE

## 2021-06-21 PROCEDURE — G8419 CALC BMI OUT NRM PARAM NOF/U: HCPCS | Performed by: INTERNAL MEDICINE

## 2021-06-21 PROCEDURE — G8427 DOCREV CUR MEDS BY ELIG CLIN: HCPCS | Performed by: INTERNAL MEDICINE

## 2021-06-21 PROCEDURE — 4004F PT TOBACCO SCREEN RCVD TLK: CPT | Performed by: INTERNAL MEDICINE

## 2021-06-21 RX ORDER — PREDNISONE 10 MG/1
TABLET ORAL
Qty: 30 TABLET | Refills: 0 | Status: SHIPPED | OUTPATIENT
Start: 2021-06-21 | End: 2021-07-07

## 2021-06-21 RX ORDER — ALBUTEROL SULFATE 2.5 MG/3ML
2.5 SOLUTION RESPIRATORY (INHALATION) EVERY 4 HOURS PRN
Qty: 120 ML | Refills: 5 | Status: SHIPPED | OUTPATIENT
Start: 2021-06-21

## 2021-06-21 RX ORDER — ALBUTEROL SULFATE 90 UG/1
2 AEROSOL, METERED RESPIRATORY (INHALATION) EVERY 6 HOURS PRN
Qty: 3 INHALER | Refills: 3 | Status: SHIPPED | OUTPATIENT
Start: 2021-06-21 | End: 2021-12-14 | Stop reason: SDUPTHER

## 2021-06-21 ASSESSMENT — COPD QUESTIONNAIRES
QUESTION3_CHESTTIGHTNESS: 4
QUESTION1_COUGHFREQUENCY: 4
QUESTION5_HOMEACTIVITIES: 3
QUESTION8_ENERGYLEVEL: 4
QUESTION6_LEAVINGHOUSE: 3
QUESTION4_WALKINCLINE: 4
QUESTION2_CHESTPHLEGM: 4
QUESTION7_SLEEPQUALITY: 4
CAT_TOTALSCORE: 30

## 2021-06-21 NOTE — PROGRESS NOTES
EXAM:  Vitals:    06/21/21 0749   BP: 118/64   Pulse: 96   Resp: 16   Temp: 96 °F (35.6 °C)   SpO2: 97%       GENERAL:  Thin, NAD  HEENT:  No scleral icterus, no conjunctival irritation  NECK:  No thyromegaly, no bruits  LYMPH:  No cervical or supraclavicular adenopathy  HEART:  Regular, rate. no murmurs  LUNGS: Poor air movement   ABDOMEN:  No distention, no organomegaly  EXTREMITIES:  No edema, no digital clubbing  NEURO:  No localizing deficits, CN II-XII intact    Pulmonary Function Testing  3/2018  My interpretation is severe obstruction with hyperinflation and reduced diffusing capacity. Bronchodilator response    Chest imaging from 2/2021 is reviewed. My interpretation is emphysema and stable nodule    ECHO  Nothing recent    Alpha-1 Anti-trypsin levels:  113, Phenotype:  M1S       Lab Results   Component Value Date    WBC 9.1 12/29/2020    HGB 13.4 12/29/2020    HCT 39.3 12/29/2020    MCV 93.5 12/29/2020     12/29/2020       No results found for: BNP    Lab Results   Component Value Date    CREATININE 0.6 12/29/2020    BUN 10 12/29/2020     12/29/2020    K 4.0 12/29/2020     12/29/2020    CO2 26 12/29/2020     I reviewed the above labs and images    COPD Assessment Test    1. I never cough vs I cough all the time:  4  2. I have no phlegm vs I am completely full of phlegm. 4  3. My chest does not feel tight vs my chest feel vs tight. 4  4. Not breathless with inclines vs breathless with inclines. 4  5. Not limited with ADLs vs Very limited with ADLs. 3  6. Confidence leaving home vs no confidence. 3  7. I sleep soundly vs I don't sleep soundly. 4  8. I have lots of energy vs I have no energy. 4    TOTAL POINTS:  30 (21)    Scoring:    A) >30. Very high impact on quality of life. Optimize therapy including rehab  B) >20. High impact on quality of life. C) 10-20. Medium impact on qualify of life  D) <10.   Low impact on life  E)  5.  Upper limit of normal in health non-smoker      Assessment/Plan:  1. COPD exacerbation (HCC)  Prednisone burst, add on albuterol nebs. Continue with breo and spiriva  - predniSONE (DELTASONE) 10 MG tablet; 40mg for 3days 30mg for 3days 20mg for 3days 10mg for 3days  Dispense: 30 tablet; Refill: 0  - albuterol (PROVENTIL) (2.5 MG/3ML) 0.083% nebulizer solution; Take 3 mLs by nebulization every 4 hours as needed for Wheezing  Dispense: 120 mL; Refill: 5    2. COPD, severe (HCC)  Breo, spiriva, and albuterol  - albuterol sulfate HFA (VENTOLIN HFA) 108 (90 Base) MCG/ACT inhaler; Inhale 2 puffs into the lungs every 6 hours as needed for Wheezing  Dispense: 3 Inhaler; Refill: 3  - tiotropium (SPIRIVA RESPIMAT) 2.5 MCG/ACT AERS inhaler; INHALE 2 PUFFS INTO THE LUNGS EVERY DAY  Dispense: 3 Inhaler; Refill: 3    3. Tobacco abuse  Active at 1/2 ppd. Used to smoke 5 ppd. Says it is hard to quit but I told her breathing will not get any better while continued smoking occurs.         Follow up in 6 months    Pulmonary Rehab:  Declined in the past    Lung Cancer Screening CT:  Up to date as of 2/2021

## 2021-06-21 NOTE — PATIENT INSTRUCTIONS
Continue with breo and spiriva    Use albuterol as needed    Will get nebulizer machine    Take prednisone burst    Need to really quit tobacco    Follow up in 6 months

## 2021-06-24 ENCOUNTER — TELEPHONE (OUTPATIENT)
Dept: FAMILY MEDICINE CLINIC | Age: 61
End: 2021-06-24

## 2021-06-24 ENCOUNTER — OFFICE VISIT (OUTPATIENT)
Dept: FAMILY MEDICINE CLINIC | Age: 61
End: 2021-06-24
Payer: MEDICARE

## 2021-06-24 VITALS
BODY MASS INDEX: 17.19 KG/M2 | SYSTOLIC BLOOD PRESSURE: 119 MMHG | HEIGHT: 66 IN | HEART RATE: 104 BPM | DIASTOLIC BLOOD PRESSURE: 70 MMHG | RESPIRATION RATE: 16 BRPM | OXYGEN SATURATION: 98 % | WEIGHT: 107 LBS

## 2021-06-24 DIAGNOSIS — E04.2 MULTINODULAR THYROID: ICD-10-CM

## 2021-06-24 DIAGNOSIS — R07.9 CHEST PAIN, UNSPECIFIED TYPE: ICD-10-CM

## 2021-06-24 DIAGNOSIS — I95.9 HYPOTENSION, UNSPECIFIED HYPOTENSION TYPE: ICD-10-CM

## 2021-06-24 DIAGNOSIS — J43.1 PANLOBULAR EMPHYSEMA (HCC): ICD-10-CM

## 2021-06-24 DIAGNOSIS — R06.09 DOE (DYSPNEA ON EXERTION): Primary | ICD-10-CM

## 2021-06-24 DIAGNOSIS — M81.8 OTHER OSTEOPOROSIS WITHOUT CURRENT PATHOLOGICAL FRACTURE: ICD-10-CM

## 2021-06-24 DIAGNOSIS — R60.0 UNILATERAL EDEMA OF LOWER EXTREMITY: ICD-10-CM

## 2021-06-24 DIAGNOSIS — R00.2 PALPITATION: ICD-10-CM

## 2021-06-24 DIAGNOSIS — E78.2 MIXED HYPERLIPIDEMIA: ICD-10-CM

## 2021-06-24 DIAGNOSIS — Z86.010 HX OF ADENOMATOUS COLONIC POLYPS: ICD-10-CM

## 2021-06-24 DIAGNOSIS — R06.00 DYSPNEA, UNSPECIFIED TYPE: Primary | ICD-10-CM

## 2021-06-24 DIAGNOSIS — F17.200 TOBACCO DEPENDENCE: ICD-10-CM

## 2021-06-24 LAB
A/G RATIO: 1.9 (ref 1.1–2.2)
ALBUMIN SERPL-MCNC: 4.4 G/DL (ref 3.4–5)
ALP BLD-CCNC: 103 U/L (ref 40–129)
ALT SERPL-CCNC: 13 U/L (ref 10–40)
ANION GAP SERPL CALCULATED.3IONS-SCNC: 12 MMOL/L (ref 3–16)
AST SERPL-CCNC: 24 U/L (ref 15–37)
BILIRUB SERPL-MCNC: 0.3 MG/DL (ref 0–1)
BUN BLDV-MCNC: 11 MG/DL (ref 7–20)
CALCIUM SERPL-MCNC: 9.4 MG/DL (ref 8.3–10.6)
CHLORIDE BLD-SCNC: 101 MMOL/L (ref 99–110)
CHOLESTEROL, TOTAL: 127 MG/DL (ref 0–199)
CO2: 27 MMOL/L (ref 21–32)
CORTISOL TOTAL: 10.2 UG/DL
CREAT SERPL-MCNC: 0.6 MG/DL (ref 0.6–1.2)
GFR AFRICAN AMERICAN: >60
GFR NON-AFRICAN AMERICAN: >60
GLOBULIN: 2.3 G/DL
GLUCOSE BLD-MCNC: 90 MG/DL (ref 70–99)
HCT VFR BLD CALC: 39.5 % (ref 36–48)
HDLC SERPL-MCNC: 43 MG/DL (ref 40–60)
HEMOGLOBIN: 13.4 G/DL (ref 12–16)
LDL CHOLESTEROL CALCULATED: 64 MG/DL
MCH RBC QN AUTO: 30.7 PG (ref 26–34)
MCHC RBC AUTO-ENTMCNC: 33.9 G/DL (ref 31–36)
MCV RBC AUTO: 90.6 FL (ref 80–100)
PDW BLD-RTO: 13 % (ref 12.4–15.4)
PLATELET # BLD: 268 K/UL (ref 135–450)
PMV BLD AUTO: 8.4 FL (ref 5–10.5)
POTASSIUM SERPL-SCNC: 5.3 MMOL/L (ref 3.5–5.1)
RBC # BLD: 4.36 M/UL (ref 4–5.2)
REASON FOR REJECTION: NORMAL
REJECTED TEST: NORMAL
SODIUM BLD-SCNC: 140 MMOL/L (ref 136–145)
TOTAL PROTEIN: 6.7 G/DL (ref 6.4–8.2)
TRIGL SERPL-MCNC: 99 MG/DL (ref 0–150)
VLDLC SERPL CALC-MCNC: 20 MG/DL
WBC # BLD: 7.4 K/UL (ref 4–11)

## 2021-06-24 PROCEDURE — G8419 CALC BMI OUT NRM PARAM NOF/U: HCPCS | Performed by: INTERNAL MEDICINE

## 2021-06-24 PROCEDURE — 3017F COLORECTAL CA SCREEN DOC REV: CPT | Performed by: INTERNAL MEDICINE

## 2021-06-24 PROCEDURE — 4004F PT TOBACCO SCREEN RCVD TLK: CPT | Performed by: INTERNAL MEDICINE

## 2021-06-24 PROCEDURE — 3023F SPIROM DOC REV: CPT | Performed by: INTERNAL MEDICINE

## 2021-06-24 PROCEDURE — 93000 ELECTROCARDIOGRAM COMPLETE: CPT | Performed by: INTERNAL MEDICINE

## 2021-06-24 PROCEDURE — G8926 SPIRO NO PERF OR DOC: HCPCS | Performed by: INTERNAL MEDICINE

## 2021-06-24 PROCEDURE — G8427 DOCREV CUR MEDS BY ELIG CLIN: HCPCS | Performed by: INTERNAL MEDICINE

## 2021-06-24 PROCEDURE — 99214 OFFICE O/P EST MOD 30 MIN: CPT | Performed by: INTERNAL MEDICINE

## 2021-06-24 RX ORDER — OMEPRAZOLE 20 MG/1
CAPSULE, DELAYED RELEASE ORAL
Qty: 30 CAPSULE | Refills: 0 | Status: SHIPPED | OUTPATIENT
Start: 2021-06-24 | End: 2021-07-07

## 2021-06-24 NOTE — PROGRESS NOTES
HPI: Laura Burnett presents for lightheadedness    Health issues include grief, emphysema, insomnia, adenomatous polyp with recheck due in 2020, hyperlipidemia, goiter, tobacco addiction,  osteoporosis asymptomatic gallstones. Feeling light headed when stand up. Sits down. Lasts 5 min. + palpitation at nighttime. Severe COPD. Hyperlipidemia. Weaning tobacco from 4 packs a day to 1/2 to 1/3 pack a day. .  Issue of unprovoked pulmonary embolism. No recent inactivity. No actual falls. Has been having increased chest pressure. Recently saw pulmonologist who put her on prednisone and ordered nebulizer solution however her insurance did not cover the nebulizer denies any GE reflux or vomiting. Weight is down. Life stressors. Getting Zetia and Lipitor. Calcification coronary arteries on CTA in 2020. No TIA or claudication. States she has some swelling in her right lower extremity. Intermittent prednisone use. Denies any vomiting or black stools. No pica. No alcohol or drugs    Severe emphysema FEV1 of 1.4 in 2017.  Has cut back to tobacco about half a pack a day from 4 packs a day.  Positive Breo and Spiriva. . Tyra Herr had her Covid vaccine #2.  Remote unprovoked PE.  CTA stable 2020 with multi lobar emphysema.  Hyperlipidemia without known cardiac disease although calcification of coronary arteries.  Is on Zetia and Lipitor. Pulmonary follow-up 6/21/2021 and given prednisone taper, nebulizer every 4 hours Breo and Spiriva declined pulmonary rehab. Since last CT screen February 21 stable. She was unable to fill her medication until today. Has had 3 rounds of prednisone this year.      Review of CT scan shows gallstones.  No symptoms.  Was unaware.     Dysphagia. .  History Heimlich maneuver with pill and intermittent choking. .  Multinodular goiter.  Positive tobacco.  Modified barium swallow without aspiration.  Recommended sitting upright with meals and taking meds with purée or liquid.  Has had no more issues. Adenomatous polyp  and negative upper endoscopy for sprue. Consider bacterial overgrowth with H2 breath test.  Follows with Dr. Rebekah Wellington. Overdue repeat colonoscopy and aware    Osteoporosis.  Positive vitamin D supplementation.  Reclast injection 2020 without adverse effects.  DEXA scan 2020 with T score of -3.8 and lumbar -3 in the right and left femoral neck.  Normal PTH, thyroid and vitamin D.neg sprue.  Positive caffeine, tobacco, low BMI. 1 PPD.   Following with rheumatology.  Positive osteoarthritis.  No longer using nonsteroidal anti-inflammatories. Upcoming Prolia injection rheumatology     Underweight.  Decreased p.o. intake.       Chronic headaches significantly improved.  MRI with microvascular changes only.  No longer using daily Motrin. .          41 years taken off ventilator 2017.  + grief persist.  Doing her new puppy     Hx Low vitamin D with appropriate supplementation. .  Positive tobacco , 1/2 ppd , caffeine 5 12 oz,, decrease weight. + osteoporosis.  No fractures. reclast twice monthly high-dose vitamin D     Hyperlipidemia  down with atorvastatin and zetia  No liver function abnormality.  No claudication or TIA symptoms.    No cardiac testing CTA with coronary calcifications. Stress echo  ejection fraction 50% increasing to 70% post exercise hyperdynamic response     Multinodular goiter.  Euthyroid.  No palpitations or stridor. OA hips and back      .        vaginal delivery without complication. No gestational diabetes or hypertension. Tubal ligation. Early menopause. Last Pap  was normal.  Sister with breast cancer.  Mammogram BI-RADS 2020        PMH:  Huma Hoffman     PE  20's     pneumonia     Tubal      SH: moved into home with daughter and    + tobacco 4 packs daily down to 1.  No alcohol.  No recreational drugs.  Retired minimal exercise. .   2017 after prolonged illness.     FH 3 brother 3 sisters    + breast cancer in sister 2 mehul GSW     -colon, ovarian cancer     ROS: Decreased vision.    No concerns with memory.  No falls.  Edentulous.  Chronic shortness of breath and wheeze.  Is with pulmonary postnasal drip.  Depression and anxiety with grief.    Insomnia proved. Ankur Vaz Positive palpitations near syncope and chest discomfort.  No breast masses.  Occasional dyspepsia.  Colonic polyps recheck due 2020. Dolores Large urinary concerns.  Occasional arthralgias.  Low BMI stable.  Wears sunscreen now.  Safe at home    Constitutional, ent, CV, respiratory, GI, , joint, skin, allergic and psychiatric ROS reviewed and negative except for above    Allergies   Allergen Reactions    Pcn [Penicillins]        Outpatient Medications Marked as Taking for the 6/24/21 encounter (Office Visit) with Dana Zapata MD   Medication Sig Dispense Refill    albuterol sulfate HFA (VENTOLIN HFA) 108 (90 Base) MCG/ACT inhaler Inhale 2 puffs into the lungs every 6 hours as needed for Wheezing 3 Inhaler 3    tiotropium (SPIRIVA RESPIMAT) 2.5 MCG/ACT AERS inhaler INHALE 2 PUFFS INTO THE LUNGS EVERY DAY 3 Inhaler 3    atorvastatin (LIPITOR) 80 MG tablet TAKE ONE TABLET BY MOUTH DAILY 90 tablet 0    Fluticasone furoate-vilanterol (BREO ELLIPTA) 200-25 MCG/INH AEPB inhaler Inhale 1 puff into the lungs daily One puff daily 3 each 1    ezetimibe (ZETIA) 10 MG tablet Take 1 tablet by mouth daily In addition to atorvastatin for cholesterol 90 tablet 0    vitamin D (ERGOCALCIFEROL) 1.25 MG (74173 UT) CAPS capsule 1 po q 2 weeks 6 capsule 3    aspirin 81 MG EC tablet Take 1 tablet by mouth daily 90 tablet 3    calcium gluconate 500 MG tablet Take 1 tablet by mouth daily 30 tablet 5             Past Medical History:   Diagnosis Date    Abnormal screening mammogram 7/31/2017    Dx right pending    Anxiety     Arthritis     Asthma     Cholelithiases 3/17/2021    Colon polyps 10/30/2017    Multiple path pending 10.2017  Recheck 10.2020    Depression     Headache 8/21/2019    History of abnormal mammogram 7/31/2017    birads 3 right recheck 8.2018 9.2019 recheck 1 year 10.2020 birad 1    Hx of adenomatous colonic polyps 10/30/2017    adenoma 10.2017  Recheck 10.2020    Multinodular thyroid 7/25/2017 7.2017 euthyroid recheck 1 year    Oral phase dysphagia 4/28/2021    Other osteoporosis without current pathological fracture 2/21/2020 2.2020  T-score of  -3.8 and a Z-score of -2.3.  spine; hip -3.0 and a Z-score of -1.7       Past Surgical History:   Procedure Laterality Date    TONSILLECTOMY      TUBAL LIGATION               Family History   Problem Relation Age of Onset    COPD Father     Breast Cancer Sister                Objective     /70   Pulse 104   Resp 16   Ht 5' 5.5\" (1.664 m)   Wt 107 lb (48.5 kg)   SpO2 98% Comment: RA  BMI 17.53 kg/m²     @LASTSAO2(3)@    Wt Readings from Last 3 Encounters:   06/21/21 107 lb (48.5 kg)   12/29/20 121 lb (54.9 kg)   12/16/20 116 lb 6.4 oz (52.8 kg)       Physical Exam     NAD alert and cooperative tachypneic  HEENT: Postnasal drip. TMs are unremarkable. Pink conjunctive a.  1 cm soft tissue mass left anterior neck. Nontender fluctuant. Well circumscribed. Lungs significantly diminished breath sounds. No wheeze rales or rhonchi. Increased FABIAN ratio with decreased inspiration. Cardiovascular exam hyperdynamic. No murmur. No ectopy. Breast without any masses or discharge. Abdomen is benign no epigastric tenderness or mass. Scaphoid. No suspicious skin lesions or nodules.       Chemistry        Component Value Date/Time     12/29/2020 1330    K 4.0 12/29/2020 1330     12/29/2020 1330    CO2 26 12/29/2020 1330    BUN 10 12/29/2020 1330    CREATININE 0.6 12/29/2020 1333    CREATININE 0.6 12/29/2020 1330        Component Value Date/Time    CALCIUM 8.9 12/29/2020 1330    ALKPHOS 114 12/29/2020 1330    AST 30 12/29/2020 1330    ALT 22

## 2021-06-24 NOTE — PATIENT INSTRUCTIONS
[address]  [address], [address]  [place] [address]  Ph [phone]  Fx [phone]    Call to set up holter monitor for heart rhythm  [phone]    Call for apt heart doctor  [address] [facility], [address] [facility], [address] [facility].    [place], [address]   [phone]    Start omeprazole in case stomach irritation from medication    Should have clot screen this afternoon if positive will get CT,  If negative I will ask you to get a plain chest xray    Salt your food increase ensure to 2 a day if able

## 2021-06-24 NOTE — TELEPHONE ENCOUNTER
[facility] called stating that the d-dimer test was rejected due to NSF specimen.     Best call back number: [phone]

## 2021-06-25 ENCOUNTER — HOSPITAL ENCOUNTER (OUTPATIENT)
Age: 61
Discharge: HOME | End: 2021-06-25
Payer: MEDICARE

## 2021-06-25 ENCOUNTER — TELEPHONE (OUTPATIENT)
Dept: FAMILY MEDICINE CLINIC | Age: 61
End: 2021-06-25

## 2021-06-25 DIAGNOSIS — R79.89 ELEVATED D-DIMER: Primary | ICD-10-CM

## 2021-06-25 DIAGNOSIS — R06.09 DOE (DYSPNEA ON EXERTION): ICD-10-CM

## 2021-06-25 DIAGNOSIS — R06.09 DOE (DYSPNEA ON EXERTION): Primary | ICD-10-CM

## 2021-06-25 DIAGNOSIS — Z86.711 HISTORY OF PULMONARY EMBOLISM: ICD-10-CM

## 2021-06-25 LAB
D DIMER: 1236 NG/ML DDU (ref 0–229)
VITAMIN D 25-HYDROXY: 66.9 NG/ML

## 2021-06-25 PROCEDURE — 36415 COLL VENOUS BLD VENIPUNCTURE: CPT

## 2021-06-25 PROCEDURE — 85379 FIBRIN DEGRADATION QUANT: CPT

## 2021-06-25 NOTE — TELEPHONE ENCOUNTER
Wrote to get CT scan or  X-ray after lab test for poss blood clot. pt is calling to find out if should have imaging done. Please advise. informed her lab results are not in yet.  Please call pt back at [phone]

## 2021-06-25 NOTE — TELEPHONE ENCOUNTER
CT scheduled for 845 tomorrow morning. Scheduling is awaiting a call back from RAD to see if they can get her in tonight.  Pt informed of all info and instructed NPO 4 hrs prior

## 2021-06-26 ENCOUNTER — HOSPITAL ENCOUNTER (OUTPATIENT)
Dept: CT IMAGING | Age: 61
Discharge: HOME | End: 2021-06-26
Payer: MEDICARE

## 2021-06-26 DIAGNOSIS — R79.89 ELEVATED D-DIMER: ICD-10-CM

## 2021-06-26 DIAGNOSIS — R06.09 DOE (DYSPNEA ON EXERTION): ICD-10-CM

## 2021-06-26 DIAGNOSIS — Z86.711 HISTORY OF PULMONARY EMBOLISM: ICD-10-CM

## 2021-06-26 PROCEDURE — 71275 CT ANGIOGRAPHY CHEST: CPT

## 2021-06-26 PROCEDURE — 6360000004 HC RX CONTRAST MEDICATION: Performed by: INTERNAL MEDICINE

## 2021-06-26 PROCEDURE — 71260 CT THORAX DX C+: CPT

## 2021-06-26 RX ADMIN — IOPAMIDOL 75 ML: 755 INJECTION, SOLUTION INTRAVENOUS at 08:46

## 2021-06-28 ENCOUNTER — TELEPHONE (OUTPATIENT)
Dept: FAMILY MEDICINE CLINIC | Age: 61
End: 2021-06-28

## 2021-06-28 DIAGNOSIS — R60.0 EDEMA OF LEFT LOWER EXTREMITY: Primary | ICD-10-CM

## 2021-06-28 NOTE — TELEPHONE ENCOUNTER
PT called in stating that she had a CT done on Saturday and PT would like to discuss that with Dr. Cindy Hardy.      Please call back: [phone]

## 2021-06-29 ENCOUNTER — HOSPITAL ENCOUNTER (OUTPATIENT)
Dept: VASCULAR LAB | Age: 61
Discharge: HOME | End: 2021-06-29
Payer: MEDICARE

## 2021-06-29 DIAGNOSIS — R60.0 EDEMA OF LEFT LOWER EXTREMITY: ICD-10-CM

## 2021-06-29 PROCEDURE — 93971 EXTREMITY STUDY: CPT

## 2021-07-07 ENCOUNTER — OFFICE VISIT (OUTPATIENT)
Dept: CARDIOLOGY CLINIC | Age: 61
End: 2021-07-07
Payer: MEDICARE

## 2021-07-07 VITALS
HEART RATE: 96 BPM | HEIGHT: 66 IN | SYSTOLIC BLOOD PRESSURE: 118 MMHG | BODY MASS INDEX: 17.74 KG/M2 | DIASTOLIC BLOOD PRESSURE: 68 MMHG | OXYGEN SATURATION: 97 % | WEIGHT: 110.4 LBS

## 2021-07-07 DIAGNOSIS — R07.9 CHEST PAIN IN ADULT: Primary | ICD-10-CM

## 2021-07-07 DIAGNOSIS — E78.5 HYPERLIPIDEMIA LDL GOAL <70: ICD-10-CM

## 2021-07-07 DIAGNOSIS — I25.10 CORONARY ARTERY CALCIFICATION SEEN ON CT SCAN: ICD-10-CM

## 2021-07-07 DIAGNOSIS — Z72.0 TOBACCO ABUSE: ICD-10-CM

## 2021-07-07 DIAGNOSIS — E78.01 FAMILIAL HYPERCHOLESTEROLEMIA: ICD-10-CM

## 2021-07-07 PROCEDURE — G8427 DOCREV CUR MEDS BY ELIG CLIN: HCPCS | Performed by: INTERNAL MEDICINE

## 2021-07-07 PROCEDURE — 99204 OFFICE O/P NEW MOD 45 MIN: CPT | Performed by: INTERNAL MEDICINE

## 2021-07-07 PROCEDURE — 93000 ELECTROCARDIOGRAM COMPLETE: CPT | Performed by: INTERNAL MEDICINE

## 2021-07-07 PROCEDURE — G8419 CALC BMI OUT NRM PARAM NOF/U: HCPCS | Performed by: INTERNAL MEDICINE

## 2021-07-07 NOTE — PROGRESS NOTES
[address] [facility]  1960 July 7, 2021    Reason for Consult: Chest pain     CC: \"I was having chest pain\"     HPI:  The patient is 57 y.o. female with a past medical history significant for coronary artery calcifications seen on CT, hyperlipidemia. She reports a remote history of a prior blood clot with no known cause. She presents as referral from Dr. Johanna Alfonso regarding palpitations and chest pain. She was previously experiencing chest pain a couple months ago. This has improved over time for her. She experienced chest pain after chasing her new puppy. This episode of chest pain lasted for 2 days and resolved with taking steroids. She has not been participating in any regular exercise. Last week, she experienced an episode of pain in her left leg that last most of the evening and night for her. This began when she got out of her car. She denies leg pain with exertion. She is a current smoker. She reports her brother suffering a heart attack in his mid 46s. She denies cardiac history in either of her parents. Review of Systems:  Constitutional: No fatigue, weakness, night sweats or fever. HEENT: No new vision difficulties or ringing in the ears. Respiratory: No new SOB, PND, orthopnea or cough. Cardiovascular: See HPI   GI: No n/v, diarrhea, constipation, abdominal pain or changes in bowel habits. No melena, no hematochezia  : No urinary frequency, urgency, incontinence, hematuria or dysuria. Skin: No cyanosis or skin lesions. Musculoskeletal: No new muscle or joint pain. Neurological: No syncope or TIA-like symptoms.   Psychiatric: No anxiety, insomnia or depression     Past Medical History:   Diagnosis Date    Abnormal screening mammogram 7/31/2017    Dx right pending    Anxiety     Arthritis     Asthma     Cholelithiases 3/17/2021    Colon polyps 10/30/2017    Multiple path pending 10.2017  Recheck 10.2020    Depression     Headache 8/21/2019  History of abnormal mammogram 7/31/2017    birads 3 right recheck 8.2018 9.2019 recheck 1 year 10.2020 birad 1    Hx of adenomatous colonic polyps 10/30/2017    adenoma 10.2017  Recheck 10.2020    Multinodular thyroid 7/25/2017 7.2017 euthyroid recheck 1 year    Oral phase dysphagia 4/28/2021    Other osteoporosis without current pathological fracture 2/21/2020 2.2020  T-score of  -3.8 and a Z-score of -2.3.  spine; hip -3.0 and a Z-score of -1.7     Past Surgical History:   Procedure Laterality Date    TONSILLECTOMY      TUBAL LIGATION       Family History   Problem Relation Age of Onset    COPD Father     Breast Cancer Sister     Heart Attack Brother      Social History     Tobacco Use    Smoking status: Current Every Day Smoker     Packs/day: 1.00     Years: 50.00     Pack years: 50.00     Types: Cigarettes    Smokeless tobacco: Never Used    Tobacco comment: cutting back now at 1/2 pack per day   Vaping Use    Vaping Use: Never used   Substance Use Topics    Alcohol use: No    Drug use: No       Allergies   Allergen Reactions    Pcn [Penicillins]      Current Outpatient Medications   Medication Sig Dispense Refill    atorvastatin (LIPITOR) 80 MG tablet TAKE ONE TABLET BY MOUTH DAILY 90 tablet 3    albuterol (PROVENTIL) (2.5 MG/3ML) 0.083% nebulizer solution Take 3 mLs by nebulization every 4 hours as needed for Wheezing 120 mL 5    albuterol sulfate HFA (VENTOLIN HFA) 108 (90 Base) MCG/ACT inhaler Inhale 2 puffs into the lungs every 6 hours as needed for Wheezing 3 Inhaler 3    tiotropium (SPIRIVA RESPIMAT) 2.5 MCG/ACT AERS inhaler INHALE 2 PUFFS INTO THE LUNGS EVERY DAY 3 Inhaler 3    Fluticasone furoate-vilanterol (BREO ELLIPTA) 200-25 MCG/INH AEPB inhaler Inhale 1 puff into the lungs daily One puff daily 3 each 1    ezetimibe (ZETIA) 10 MG tablet Take 1 tablet by mouth daily In addition to atorvastatin for cholesterol 90 tablet 0    vitamin D (ERGOCALCIFEROL) 1.25 MG (47786 Results   Component Value Date    TRIG 99 06/24/2021    HDL 43 06/24/2021    LDLCALC 64 06/24/2021    LABVLDL 20 06/24/2021     Lab Results   Component Value Date     06/24/2021    K 5.3 06/24/2021    BUN 11 06/24/2021    CREATININE 0.6 06/24/2021     No results for input(s): WBC, HGB, HCT, PLT in the last 72 hours. Assessment:  1. Chest pain   2. Coronary artery calcification seen on CT   3. Hyperlipidemia with LDL goal <70 mg/dL   4. Tobacco Abuse    Plan:  Her chest pain does have atypical features and has improved for her in the recent weeks. However, she does have risk factors for coronary disease with her family history and her smoking history. I will have her complete a GXT stress test to rule out obstructive disease. I have encouraged her in risk factor modification, especially smoking cessation, as well as increasing her aerobic activity and adhering to a heart healthy diet. I have personally reviewed all previous testing for this visit today including imaging, lab results and EKG as detailed above. I will see her in the office pending test results. This note was scribed in the presence of Jhon Chase MD by General Rosas, RN. Physician Attestation:  The scribes documentation has been prepared under my direction and personally reviewed by me in its entirety. I, Dr. Talha Beaver personally performed the services described in this documentation as scribed by my RN in my presence, and I confirm that the note above accurately reflects all work, treatment, procedures, and medical decision making performed by me.

## 2021-07-14 ENCOUNTER — TELEPHONE (OUTPATIENT)
Dept: CARDIOLOGY CLINIC | Age: 61
End: 2021-07-14

## 2021-07-14 ENCOUNTER — HOSPITAL ENCOUNTER (OUTPATIENT)
Dept: NON INVASIVE DIAGNOSTICS | Age: 61
Discharge: HOME | End: 2021-07-14
Payer: MEDICARE

## 2021-07-14 DIAGNOSIS — I25.10 CORONARY ARTERY CALCIFICATION SEEN ON CT SCAN: ICD-10-CM

## 2021-07-14 DIAGNOSIS — R07.9 CHEST PAIN IN ADULT: ICD-10-CM

## 2021-07-14 DIAGNOSIS — R94.39 ABNORMAL CARDIOVASCULAR STRESS TEST: ICD-10-CM

## 2021-07-14 DIAGNOSIS — I47.29 NSVT (NONSUSTAINED VENTRICULAR TACHYCARDIA): Primary | ICD-10-CM

## 2021-07-14 PROCEDURE — 93017 CV STRESS TEST TRACING ONLY: CPT | Performed by: INTERNAL MEDICINE

## 2021-07-14 RX ORDER — METOPROLOL SUCCINATE 25 MG/1
25 TABLET, EXTENDED RELEASE ORAL DAILY
Qty: 30 TABLET | Refills: 3 | Status: SHIPPED | OUTPATIENT
Start: 2021-07-14 | End: 2021-11-17

## 2021-07-14 RX ORDER — ASPIRIN 325 MG
325 TABLET, DELAYED RELEASE (ENTERIC COATED) ORAL DAILY
Qty: 30 TABLET | Refills: 3 | Status: SHIPPED
Start: 2021-07-14 | End: 2021-08-02 | Stop reason: HOSPADM

## 2021-07-14 NOTE — TELEPHONE ENCOUNTER
Lazarus Nagel called in this afternoon and had her Stress test performed today 7/14 and states that the DrCarolyn At the hospital told her she should f/u with Dr. Wendy Renteria. Please advise after results have been read.      She can be reached at [phone]

## 2021-07-14 NOTE — TELEPHONE ENCOUNTER
Spoke with Dr. Shonda Sidhu regarding stress results. Patient should be started on Toprol 25 mg daily and  mg and arrange for heart catheterization. Scheduled for [facility] on 8/2/21 at 9:30 am with 8:00 am arrival time. The morning of your procedure you will park in the hospital parking lot and report directly to the cath lab to check in.     Pre-Procedure Instructions   1. You will need to fast for at least 8 hours prior to procedure. No caffeine the morning of.   2. All medications can be taken in the morning with sips of water. 3. You will need to take 325 mg aspirin the morning of. If you are currently taking 81 mg please take 4 tablets that morning. 4. Do not use any lotions, creams or perfume the morning of procedure. 5. Pre-procedure lab work will need to be completed 5-7 days prior to procedure. 6. Please have a responsible adult to drive you home after procedure. We advise you have someone stay with you for the first 24 hours for precautionary measures. Depending on your procedure you may require an overnight stay. 7. Cath lab will provide you with all post procedure instructions. If you have any questions regarding the procedure itself or medications, please call [phone] and ask to speak with a nurse. Case published to Kaci and form emailed to Aly Newman in the cath lab.

## 2021-07-14 NOTE — TELEPHONE ENCOUNTER
Dr Kuldip Gilman reviewed stress test . Recommended a cardiac cath with Dr Jd Segura, patient taken to the  to schedule.  Dr Kuldip Gilman notified Dr Jd Segura

## 2021-07-22 ENCOUNTER — TELEPHONE (OUTPATIENT)
Dept: CARDIOLOGY CLINIC | Age: 61
End: 2021-07-22

## 2021-07-22 DIAGNOSIS — R94.39 ABNORMAL CARDIOVASCULAR STRESS TEST: ICD-10-CM

## 2021-07-22 DIAGNOSIS — I47.29 NSVT (NONSUSTAINED VENTRICULAR TACHYCARDIA) (HCC): ICD-10-CM

## 2021-07-22 LAB
ANION GAP SERPL CALCULATED.3IONS-SCNC: 10 MMOL/L (ref 3–16)
BUN BLDV-MCNC: 11 MG/DL (ref 7–20)
CALCIUM SERPL-MCNC: 9.2 MG/DL (ref 8.3–10.6)
CHLORIDE BLD-SCNC: 102 MMOL/L (ref 99–110)
CO2: 26 MMOL/L (ref 21–32)
CREAT SERPL-MCNC: 0.5 MG/DL (ref 0.6–1.2)
GFR AFRICAN AMERICAN: >60
GFR NON-AFRICAN AMERICAN: >60
GLUCOSE BLD-MCNC: 96 MG/DL (ref 70–99)
HCT VFR BLD CALC: 39.5 % (ref 36–48)
HEMOGLOBIN: 13.7 G/DL (ref 12–16)
MCH RBC QN AUTO: 31.9 PG (ref 26–34)
MCHC RBC AUTO-ENTMCNC: 34.8 G/DL (ref 31–36)
MCV RBC AUTO: 91.6 FL (ref 80–100)
PDW BLD-RTO: 13.4 % (ref 12.4–15.4)
PLATELET # BLD: 299 K/UL (ref 135–450)
PMV BLD AUTO: 8.2 FL (ref 5–10.5)
POTASSIUM SERPL-SCNC: 5.3 MMOL/L (ref 3.5–5.1)
RBC # BLD: 4.31 M/UL (ref 4–5.2)
SODIUM BLD-SCNC: 138 MMOL/L (ref 136–145)
WBC # BLD: 5.2 K/UL (ref 4–11)

## 2021-07-22 NOTE — TELEPHONE ENCOUNTER
Luan Marcus called in this afternoon stating that she is having an angiogram on 8/2/21, she leaving to travel out of town on Saturday, but wants to make sure it's okay that she can fly?        You can reach Luan Marcus at #[phone]

## 2021-08-02 ENCOUNTER — HOSPITAL ENCOUNTER (OUTPATIENT)
Dept: CARDIAC CATH/INVASIVE PROCEDURES | Age: 61
Discharge: HOME | End: 2021-08-02
Payer: MEDICARE

## 2021-08-02 VITALS
OXYGEN SATURATION: 100 % | HEIGHT: 65 IN | BODY MASS INDEX: 18.16 KG/M2 | DIASTOLIC BLOOD PRESSURE: 76 MMHG | HEART RATE: 70 BPM | WEIGHT: 109 LBS | TEMPERATURE: 97.2 F | RESPIRATION RATE: 16 BRPM | SYSTOLIC BLOOD PRESSURE: 124 MMHG

## 2021-08-02 LAB — POC ACT LR: 310 SEC

## 2021-08-02 PROCEDURE — 93458 L HRT ARTERY/VENTRICLE ANGIO: CPT | Performed by: INTERNAL MEDICINE

## 2021-08-02 PROCEDURE — 85347 COAGULATION TIME ACTIVATED: CPT

## 2021-08-02 PROCEDURE — 2709999900 HC NON-CHARGEABLE SUPPLY

## 2021-08-02 PROCEDURE — 93571 IV DOP VEL&/PRESS C FLO 1ST: CPT

## 2021-08-02 PROCEDURE — 2580000003 HC RX 258

## 2021-08-02 PROCEDURE — 2500000003 HC RX 250 WO HCPCS

## 2021-08-02 PROCEDURE — C1769 GUIDE WIRE: HCPCS

## 2021-08-02 PROCEDURE — C1894 INTRO/SHEATH, NON-LASER: HCPCS

## 2021-08-02 PROCEDURE — 6360000002 HC RX W HCPCS

## 2021-08-02 PROCEDURE — 99152 MOD SED SAME PHYS/QHP 5/>YRS: CPT | Performed by: INTERNAL MEDICINE

## 2021-08-02 PROCEDURE — C1887 CATHETER, GUIDING: HCPCS

## 2021-08-02 PROCEDURE — 93458 L HRT ARTERY/VENTRICLE ANGIO: CPT

## 2021-08-02 PROCEDURE — 99153 MOD SED SAME PHYS/QHP EA: CPT

## 2021-08-02 PROCEDURE — 99152 MOD SED SAME PHYS/QHP 5/>YRS: CPT

## 2021-08-02 PROCEDURE — 93571 IV DOP VEL&/PRESS C FLO 1ST: CPT | Performed by: INTERNAL MEDICINE

## 2021-08-02 PROCEDURE — 6360000004 HC RX CONTRAST MEDICATION: Performed by: INTERNAL MEDICINE

## 2021-08-02 RX ORDER — SODIUM CHLORIDE 9 MG/ML
25 INJECTION, SOLUTION INTRAVENOUS PRN
Status: DISCONTINUED | OUTPATIENT
Start: 2021-08-02 | End: 2021-08-02 | Stop reason: SDUPTHER

## 2021-08-02 RX ORDER — SODIUM CHLORIDE 0.9 % (FLUSH) 0.9 %
5-40 SYRINGE (ML) INJECTION EVERY 12 HOURS SCHEDULED
Status: DISCONTINUED | OUTPATIENT
Start: 2021-08-02 | End: 2021-08-03 | Stop reason: HOSPADM

## 2021-08-02 RX ORDER — SODIUM CHLORIDE 0.9 % (FLUSH) 0.9 %
5-40 SYRINGE (ML) INJECTION PRN
Status: DISCONTINUED | OUTPATIENT
Start: 2021-08-02 | End: 2021-08-03 | Stop reason: HOSPADM

## 2021-08-02 RX ORDER — SODIUM CHLORIDE 9 MG/ML
INJECTION, SOLUTION INTRAVENOUS CONTINUOUS
Status: DISCONTINUED | OUTPATIENT
Start: 2021-08-02 | End: 2021-08-03 | Stop reason: HOSPADM

## 2021-08-02 RX ORDER — ACETAMINOPHEN 325 MG/1
650 TABLET ORAL EVERY 4 HOURS PRN
Status: DISCONTINUED | OUTPATIENT
Start: 2021-08-02 | End: 2021-08-03 | Stop reason: HOSPADM

## 2021-08-02 RX ORDER — ASPIRIN 81 MG/1
81 TABLET, CHEWABLE ORAL DAILY
Qty: 30 TABLET | Refills: 3 | Status: SHIPPED | OUTPATIENT
Start: 2021-08-02

## 2021-08-02 RX ORDER — SODIUM CHLORIDE 0.9 % (FLUSH) 0.9 %
5-40 SYRINGE (ML) INJECTION PRN
Status: DISCONTINUED | OUTPATIENT
Start: 2021-08-02 | End: 2021-08-02 | Stop reason: SDUPTHER

## 2021-08-02 RX ORDER — SODIUM CHLORIDE 0.9 % (FLUSH) 0.9 %
5-40 SYRINGE (ML) INJECTION EVERY 12 HOURS SCHEDULED
Status: DISCONTINUED | OUTPATIENT
Start: 2021-08-02 | End: 2021-08-02 | Stop reason: SDUPTHER

## 2021-08-02 RX ORDER — SODIUM CHLORIDE 9 MG/ML
25 INJECTION, SOLUTION INTRAVENOUS PRN
Status: DISCONTINUED | OUTPATIENT
Start: 2021-08-02 | End: 2021-08-03 | Stop reason: HOSPADM

## 2021-08-02 RX ADMIN — IOPAMIDOL 65 ML: 755 INJECTION, SOLUTION INTRAVENOUS at 10:10

## 2021-08-02 NOTE — ANESTHESIA PRE-OP
Brief Pre-Op Note/Sedation Assessment      Josue Treviño  1960  [id number]  9:04 AM    Planned Procedure: Cardiac Catheterization Procedure  Post Procedure Plan: Return to same level of care  Consent: I have discussed with the patient and/or the patient representative the indication, alternatives, and the possible risks and/or complications of the planned procedure and the anesthesia methods. The patient and/or patient representative appear to understand and agree to proceed. Chief Complaint:   Chest Pain/Pressure      Indications for Cath Procedure:  1. Presentation:  Worsening Angina  2. Anginal Classification within 2 weeks:  CCS III - Symptoms with everyday living activities, i.e., moderate limitation  3. Angina Symptoms Assessment:  Atypical Chest Pain  4. Heart Failure Class within last 2 weeks:  No symptoms  5. Cardiovascular Instability:  No    Prior Ischemic Workup/Eval:  1. Pre-Procedural Medications: Yes: Aspirin and Beta Blockers  2. Stress Test Completed? Yes:  Stress or Imaging Studies Performed (within ANY time period):   Type:  Exercise Stress test (No imaging)  Results:  Positive:  Ischemia on ECG Extent of Ischemia:  High Risk (>3% annual death or MI)    Does Patient need surgery? Cath Valve Surgery:  No    Pre-Procedure Medical History:  Vital Signs:  /76   Pulse 70   Temp 97.2 °F (36.2 °C) (Infrared)   Resp 16   Ht 5' 5\" (1.651 m)   Wt 109 lb (49.4 kg)   SpO2 100%   BMI 18.14 kg/m²     Allergies:     Allergies   Allergen Reactions    Pcn [Penicillins]      Medications:    Current Outpatient Medications   Medication Sig Dispense Refill    metoprolol succinate (TOPROL XL) 25 MG extended release tablet Take 1 tablet by mouth daily 30 tablet 3    aspirin 325 MG EC tablet Take 1 tablet by mouth daily 30 tablet 3    atorvastatin (LIPITOR) 80 MG tablet TAKE ONE TABLET BY MOUTH DAILY 90 tablet 3    albuterol (PROVENTIL) (2.5 MG/3ML) 0.083% nebulizer solution Take 3 2/21/2020    2.2020  T-score of  -3.8 and a Z-score of -2.3.  spine; hip -3.0 and a Z-score of -1.7       Surgical History:    Past Surgical History:   Procedure Laterality Date    TONSILLECTOMY      TUBAL LIGATION               Pre-Sedation:  Pre-Sedation Documentation and Exam:  I have personally completed a history, physical exam & review of systems for this patient (see notes). Prior History of Anesthesia Complications:   none    Modified Mallampati:  II (soft palate, uvula, fauces visible)    ASA Classification:  Class 2 - A normal healthy patient with mild systemic disease    Donny Scale: Activity:  2 - Able to move 4 extremities voluntarily on command  Respiration:  2 - Able to breathe deeply and cough freely  Circulation:  2 - BP+/- 20mmHg of normal  Consciousness:  2 - Fully awake  Oxygen Saturation (color):  2 - Able to maintain oxygen saturation >92% on room air    Sedation/Anesthesia Plan:  Guard the patient's safety and welfare. Minimize physical discomfort and pain. Minimize negative psychological responses to treatment by providing sedation and analgesia and maximize the potential amnesia. Patient to meet pre-procedure discharge plan.     Medication Planned:  midazolam intravenously and fentanyl intravenously    Patient is an appropriate candidate for plan of sedation:   yes      Electronically signed by Phoebe Garnica MD on 8/2/2021 at 9:04 AM

## 2021-08-02 NOTE — H&P
CC: \"I was having chest pain\"      HPI:  The patient is 57 y.o. female with a past medical history significant for coronary artery calcifications seen on CT, hyperlipidemia. She reports a remote history of a prior blood clot with no known cause. She presents as referral from Dr. Dacia Yanes regarding palpitations and chest pain.      She was previously experiencing chest pain a couple months ago. This has improved over time for her. She experienced chest pain after chasing her new puppy. This episode of chest pain lasted for 2 days and resolved with taking steroids. She has not been participating in any regular exercise. Last week, she experienced an episode of pain in her left leg that last most of the evening and night for her. This began when she got out of her car. She denies leg pain with exertion.      She is a current smoker. She reports her brother suffering a heart attack in his mid 46s. She denies cardiac history in either of her parents.      Review of Systems:  Constitutional: No fatigue, weakness, night sweats or fever. HEENT: No new vision difficulties or ringing in the ears. Respiratory: No new SOB, PND, orthopnea or cough. Cardiovascular: See HPI   GI: No n/v, diarrhea, constipation, abdominal pain or changes in bowel habits. No melena, no hematochezia  : No urinary frequency, urgency, incontinence, hematuria or dysuria. Skin: No cyanosis or skin lesions. Musculoskeletal: No new muscle or joint pain. Neurological: No syncope or TIA-like symptoms.   Psychiatric: No anxiety, insomnia or depression     Past Medical History        Past Medical History:   Diagnosis Date    Abnormal screening mammogram 7/31/2017     Dx right pending    Anxiety      Arthritis      Asthma      Cholelithiases 3/17/2021    Colon polyps 10/30/2017     Multiple path pending 10.2017  Recheck 10.2020    Depression      Headache 8/21/2019    History of abnormal mammogram 7/31/2017     birads 3 right recheck 8.2018 cholesterol 90 tablet 0    vitamin D (ERGOCALCIFEROL) 1.25 MG (14790 UT) CAPS capsule 1 po q 2 weeks 6 capsule 3    aspirin 81 MG EC tablet Take 1 tablet by mouth daily 90 tablet 3    calcium gluconate 500 MG tablet Take 1 tablet by mouth daily 30 tablet 5      No current facility-administered medications for this visit.            Physical Exam:   /68   Pulse 96   Ht 5' 5.5\" (1.664 m)   Wt 110 lb 6.4 oz (50.1 kg)   SpO2 97%   BMI 18.09 kg/m²   No intake or output data in the 24 hours ending 07/07/21 1619      Wt Readings from Last 2 Encounters:   07/07/21 110 lb 6.4 oz (50.1 kg)   06/24/21 107 lb (48.5 kg)      Constitutional: She is oriented to person, place, and time. She appears well-developed and well-nourished. In no acute distress. Head: Normocephalic and atraumatic. Neck: Neck supple. No JVD present. Carotid bruit is not present. No mass and no thyromegaly present. No lymphadenopathy present. Cardiovascular: Normal rate, regular rhythm, normal heart sounds and intact distal pulses. Exam reveals no gallop and no friction rub. No murmur heard. Pulmonary/Chest: Effort normal and breath sounds normal. No respiratory distress. She has no wheezes, rhonchi or rales. Abdominal: Soft, non-tender. Bowel sounds and aorta are normal. She exhibits no organomegaly, mass or bruit. Extremities: No edema, cyanosis, or clubbing. Pulses are 2+ radial/carotid/dorsalis pedis and posterior tibial bilaterally. Neurological: She is alert and oriented to person, place, and time. She has normal reflexes. No cranial nerve deficit. Coordination normal.   Skin: Skin is warm and dry. There is no rash or diaphoresis. Psychiatric: She has a normal mood and affect. Her speech is normal and behavior is normal.      EKG Interpretation 7/7/21: Sinus rhythm with normal intervals and axis        Imaging:      Stress echo 10/9/14   Summary   Normal stress echocardiogram study.     Chest CT 2/19/21  Atherosclerotic change seen in   aorta.  Coronary artery calcification is seen.         Lab Review:         Lab Results   Component Value Date     TRIG 99 06/24/2021     HDL 43 06/24/2021     LDLCALC 64 06/24/2021     LABVLDL 20 06/24/2021            Lab Results   Component Value Date      06/24/2021     K 5.3 06/24/2021     BUN 11 06/24/2021     CREATININE 0.6 06/24/2021      No results for input(s): WBC, HGB, HCT, PLT in the last 72 hours.     Assessment:  1. Chest pain   2. Coronary artery calcification seen on CT   3. Hyperlipidemia with LDL goal <70 mg/dL   4. Tobacco Abuse     Plan:  Her chest pain does have atypical features and has improved for her in the recent weeks. However, she does have risk factors for coronary disease with her family history and her smoking history. I will have her complete a GXT stress test to rule out obstructive disease. I have encouraged her in risk factor modification, especially smoking cessation, as well as increasing her aerobic activity and adhering to a heart healthy diet. I have personally reviewed all previous testing for this visit today including imaging, lab results and EKG as detailed above.       I will see her in the office pending test results. Vitals:    08/02/21 0857   BP: 124/76   Pulse: 70   Resp: 16   Temp: 97.2 °F (36.2 °C)   SpO2: 100%     I have reviewed the most recent H&P for this patient and independently examined the patient. There have been no changes. We will proceed with the planned procedure.     Rosalinda Remy MD

## 2021-08-02 NOTE — PROCEDURES
[address] [place] [address]                            CARDIAC CATHETERIZATION    PATIENT NAME: Jennifer Muñoz                       :        1960  MED REC NO:   [id number]                          ROOM:  ACCOUNT NO:   [de-identified]                           ADMIT DATE: 2021  PROVIDER:     Joaquina Jimenez MD    DATE OF PROCEDURE:  2021    INDICATION FOR PROCEDURE:  Abnormal cardiovascular stress test,  nonsustained ventricular tachycardia. PROCEDURE:  The risks and benefits of the procedure were explained to  the patient and informed consent was obtained. She was placed on the  cardiac catheterization table and prepped and draped in sterile fashion. An Florence test had been performed on the right wrist to ensure an intact  palmar arch. Anesthesia was provided in the volar surface with 1% lidocaine injected  subcutaneously. The right radial artery was accessed and cannulated and a 5-Malaysian  sheath inserted using Seldinger technique. The pre-cocktail of heparin,  verapamil and nitroglycerin was given through the sheath port. Over the 0.035 J-wire, the JL3.5, 5-Malaysian diagnostic catheter was  advanced to the ostium of the left main coronary artery. Coronary  angiography was performed to this system. The catheter was removed over  the wire. Next, the JR5, 5-Malaysian diagnostic catheter was advanced to  the ostium of the right coronary artery. Coronary angiography was  performed to this system. Catheter was removed over the wire. Lastly, the pigtail catheter was advanced over the wire into the left  ventricle. Left ventricular end-diastolic pressure measurements were  obtained and then a power injection left ventriculogram was performed. The catheter was flushed and placed back on pressure and then pulled  back across the aortic valve to assess for gradient.   The catheter was  removed over the wire.    The decision was made to perform fractional flow reserve on an  angiographic 60% left anterior descending artery lesion. Over the 0.035 J-wire, the EBU3.5, 6-Yoruba guide catheter was advanced  to the ostium of the left main coronary artery. The LAD was wired with  a Runthrough 0.014 coronary wire. The patient was given IV  unfractionated heparin and ACTs were checked throughout the case to  maintain an ACT greater than 250 seconds. The ACIST fractional flow reserve catheter was advanced into the left  main coronary artery and equalized. The catheter was then advanced into  the mid LAD distal to the lesion in question. IV adenosine was then started. At maximum hyperemia, this demonstrated  nonsignificance with a ratio of 0.85. IV adenosine was stopped and the  ACIST catheter was removed. The wire was removed and final angiography  performed. The catheter was removed over the wire. The post cocktail of verapamil and nitroglycerin was given through the  sheath port. Right radial sheath was removed and a Terumo nonocclusive  pressure band was applied for hemostasis. There were no complications  from the procedure and estimated blood loss was less than 20 mL. FINDINGS:  1. Right-dominant coronary arterial circulation. There is no  angiographic evidence of coronary atherosclerosis in the right coronary  artery. The left main and the circumflex arteries are free of disease. There is an angiographic 60% lesion in the mid left anterior descending  artery and a 50% lesion more distal to this. This was nonsignificant by  fractional flow reserve with a ratio of 0.85.  2.  Normal left ventricular end-diastolic pressure at 10 mmHg. 3.  Normal left ventricular systolic function. LV ejection fraction of  60%. 4.  No gradient across the aortic valve on pullback to suggest aortic  stenosis. Moderate sedation was performed for this procedure. Total duration of  sedation was 27 minutes. The patient received 2 mg of IV Versed and 100  mcg of fentanyl. Vital signs were monitored throughout the sedation  period and remained stable. The patient had an ASA grade of II and a  Mallampati score of II. Sedation was administered by an independent  agent under my supervision and direction. I was present the entire  time. There were no complications from sedation.         Oliva Izquierdo MD    D: 08/02/2021 10:18:48       T: 08/02/2021 10:22:24     [email or web address]  Job#: [id number]     Doc#: [id number]    CC:  Brandy Haney MD

## 2021-08-12 NOTE — PROGRESS NOTES
2021   Patient Name: John Pelletier  : 1960  Medical Record: [id number]    MEDICATIONS  Current Outpatient Medications   Medication Sig Dispense Refill    zoledronic acid (RECLAST) 5 MG/100ML SOLN Infuse 100 mLs intravenously once for 1 dose 100 mL 0    aspirin (ASPIRIN CHILDRENS) 81 MG chewable tablet Take 1 tablet by mouth daily 30 tablet 3    metoprolol succinate (TOPROL XL) 25 MG extended release tablet Take 1 tablet by mouth daily 30 tablet 3    atorvastatin (LIPITOR) 80 MG tablet TAKE ONE TABLET BY MOUTH DAILY 90 tablet 3    albuterol (PROVENTIL) (2.5 MG/3ML) 0.083% nebulizer solution Take 3 mLs by nebulization every 4 hours as needed for Wheezing 120 mL 5    albuterol sulfate HFA (VENTOLIN HFA) 108 (90 Base) MCG/ACT inhaler Inhale 2 puffs into the lungs every 6 hours as needed for Wheezing 3 Inhaler 3    tiotropium (SPIRIVA RESPIMAT) 2.5 MCG/ACT AERS inhaler INHALE 2 PUFFS INTO THE LUNGS EVERY DAY 3 Inhaler 3    Fluticasone furoate-vilanterol (BREO ELLIPTA) 200-25 MCG/INH AEPB inhaler Inhale 1 puff into the lungs daily One puff daily 3 each 1    ezetimibe (ZETIA) 10 MG tablet Take 1 tablet by mouth daily In addition to atorvastatin for cholesterol 90 tablet 0    vitamin D (ERGOCALCIFEROL) 1.25 MG (92807 UT) CAPS capsule 1 po q 2 weeks 6 capsule 3    calcium gluconate 500 MG tablet Take 1 tablet by mouth daily 30 tablet 5     No current facility-administered medications for this visit. ALLERGIES  Allergies   Allergen Reactions    Pcn [Penicillins]          Comments  No specialty comments available. Background history:  John Pelletier is a 57 y.o. female who is being seen for follow up evaluation of  osteoporosis and osteoarthritis. He had a DEXA scan on 2020 which showed T score of -3.8 at the lumbar spine, -3 at right and left femoral neck. She denies any history of fragility fractures. She denies any family history of osteoporosis.   She denies any family history of hip fractures. She smokes 1-1/2 packs of cigarettes a day. She has never been on treatment for osteoporosis. She does not drink alcohol. She denies any back pain. She takes vitamin D 50,000 once a week. He also has pain, stiffness in joints which is worse in her right knee and right hand. Right knee cracks and pops. She has pain in the right knee going up and down the steps, standing from the sitting position. She has difficulty opening doorknobs and jars. She denies any swelling in the joints. She has morning stiffness lasting for few minutes. She has difficulty holding things with the hand. She denies any family history of rheumatoid arthritis. She takes Tylenol or over-the-counter Aleve as needed with some benefit. Osteoporosis Risk Factors   Nonmodifiable  Personal Hx of fracture as an adult: no  Hx of fracture in first-degree relative: no   race: yes  Advanced age: no  Female sex: yes  Dementia: no  Poor health/frailty: no     Potentially modifiable:  Tobacco use: yes -1-1/2 pack/day  Low body weight (<127 lbs): yes  Estrogen deficiency     early menopause (age <45) or bilateral ovariectomy: yes     prolonged premenopausal amenorrhea (>1 yr): no  Low calcium intake (lifelong): no  Alcoholism: no  Recurrent falls: no  Inadequate physical activity: no    Current calcium and Vit D intake:  Dietary sources: Unknown  Supplements: Unknown  Interim history: She presents for follow-up of osteoporosis and osteoarthritis. She received Reclast infusion on June 23, 2020. She has not noticed any side effects. She denies recent fragility fractures. She takes calcium and vitamin D. She also has concomitant osteoarthritis. She has osteoarthritis in hands and knees. She denies any daily joint pain or swelling or stiffness. She has not required to take naproxen recently. Blood work came back negative for rheumatoid arthritis. She denies any side effects with naproxen.     HPI  Review of Systems    REVIEW OF SYSTEMS: Positive for fatigue, osteoporosis, dry mouth, loss of taste, headaches, excessive worries, anxiety  Constitutional: No unanticipated weight loss or fevers. No fatigue and malaise. Integumentary: No rash, photosensitivity, malar rash, livedo reticularis, alopecia and Raynaud's symptoms, sclerodactyly, skin tightening  Eyes: negative for visual disturbance and persistent redness, discharge from eyes   ENT: - No tinnitus, loss of hearing, vertigo, or recurrent ear infections.  - No history of nasal/oral ulcers. - No history of dry eyes  Cardiovascular: No history of pericarditis, chest pain or murmur or palpitations  Respiratory: No history of interstitial lung disease. No history of pleurisy. No history of tuberculosis or atypical infections. Gastrointestinal: No history of heart burn, dysphagia or esophageal dysmotility. No change in bowel habits or any inflammatory bowel disease. Genitourinary: No history renal disease, miscarriages. Hematologic/Lymphatic: No abnormal bruising or bleeding, blood clots or swollen lymph nodes. Neurological: No history of seizure or focal weakness. No history of neuropathies, paresthesias or hyperesthesias, facial droop, diplopia  Psychiatric: No history of bipolar disease, depression  Endocrine: Denies any polyuria, polydipsia  Allergic/Immunologic: No nasal congestion or hives. I have reviewed patients Past medical History, Social History and Family History as mentioned in her chart and this remains unchanged fromprevious.     Past Medical History:   Diagnosis Date    Abnormal screening mammogram 7/31/2017    Dx right pending    Anxiety     Arthritis     Asthma     Cholelithiases 3/17/2021    Colon polyps 10/30/2017    Multiple path pending 10.2017  Recheck 10.2020    Depression     Headache 8/21/2019    History of abnormal mammogram 7/31/2017    birads 3 right recheck 8.2018 9.2019 recheck 1 year 10.2020 birad 1    Hx of adenomatous colonic polyps 10/30/2017    adenoma 10.2017  Recheck 10.2020    Multinodular thyroid 7/25/2017 7.2017 euthyroid recheck 1 year    Oral phase dysphagia 4/28/2021    Other osteoporosis without current pathological fracture 2/21/2020 2.2020  T-score of  -3.8 and a Z-score of -2.3.  spine; hip -3.0 and a Z-score of -1.7     Past Surgical History:   Procedure Laterality Date    TONSILLECTOMY      TUBAL LIGATION       Social History     Socioeconomic History    Marital status:      Spouse name: Not on file    Number of children: Not on file    Years of education: Not on file    Highest education level: Not on file   Occupational History    Not on file   Tobacco Use    Smoking status: Current Every Day Smoker     Packs/day: 1.00     Years: 50.00     Pack years: 50.00     Types: Cigarettes    Smokeless tobacco: Never Used    Tobacco comment: cutting back now at 1/2 pack per day   Vaping Use    Vaping Use: Never used   Substance and Sexual Activity    Alcohol use: No    Drug use: No    Sexual activity: Yes     Partners: Male   Other Topics Concern    Not on file   Social History Narrative    Not on file     Social Determinants of Health     Financial Resource Strain:     Difficulty of Paying Living Expenses:    Food Insecurity:     Worried About Running Out of Food in the Last Year:     Ran Out of Food in the Last Year:    Transportation Needs:     Lack of Transportation (Medical):      Lack of Transportation (Non-Medical):    Physical Activity:     Days of Exercise per Week:     Minutes of Exercise per Session:    Stress:     Feeling of Stress :    Social Connections:     Frequency of Communication with Friends and Family:     Frequency of Social Gatherings with Friends and Family:     Attends [employer] Services:     Active Member of Clubs or Organizations:     Attends Club or Organization Meetings:     Marital Status:    Intimate Partner Violence:     Fear of Current or Ex-Partner:     Emotionally Abused:     Physically Abused:     Sexually Abused:      Family History   Problem Relation Age of Onset    COPD Father     Breast Cancer Sister     Heart Attack Brother      Vitals:    08/13/21 0720   BP: 116/68   Pulse: 84   Weight: 108 lb 6.4 oz (49.2 kg)     Physical Exam  Constitutional:  Well developed, well nourished, no acute distress, non-toxic appearance   Musculoskeletal:    RIGHT  Swell  Tender  ROM  LEFT  Swell  Tender  ROM    DIP2  0  0   [place]  0  0   [place]   DIP3  0  0   [place]  0  0   [place]   DIP4  0  0   [place]  0  0   [place]   DIP5  0  0   [place]  0  0   [place]   PIP1  0  0   bony change  0  0   bony change   PIP2  0  0   bony change  0  0   bony change   PIP3  0  0   bony change  0  0   bony change   PIP4  0  0   bony change  0  0   bony change   PIP5  0  0   bony change  0  0   bony change   MCP1  0  0  FULL   0  0  FULL    MCP2  0  0  FULL   0  0  FULL    MCP3  0  0  FULL   0  0  FULL    MCP4  0  0  FULL   0  0  FULL    MCP5  0  0  FULL   0  0  FULL    Wrist  0  0  FULL   0  0  FULL    Elbow  0  0  FULL   0  0  FULL    Shouldr  0  0  FULL   0  0  FULL    Hip  0  0  FULL   0  0  FULL    Knee  0  0   crepitus  0  0   crepitus   Ankle  0  0  FULL   0  0  FULL    MTP1  0  0  FULL   0  0  FULL    MTP2  0  0  FULL   0  0  FULL    MTP3  0  0  FULL   0  0  FULL    MTP4  0  0  FULL   0  0  FULL    MTP5  0  0  FULL   0  0  FULL    IP1  0  0  FULL   0  0  FULL    IP2  0  0  FULL   0  0  FULL    IP3  0  0  FULL   0  0  FULL    IP4  0  0  FULL   0  0  FULL    IP5  0  0  FULL   0 0 FULL       Ambulates without assistance, normal gait  Neck: Full ROM, no tenderness,supple   Back- no tenderness. Eyes:  PERRL, extra ocular movements intact, conjunctiva normal   HEENT:  Atraumatic, normocephalic, external ears normal, oropharynx moist, no pharyngeal exudates.    Respiratory:  No respiratory distress  GI:  Soft, nondistended, normal bowel sounds, nontender, noorganomegaly, no mass, no rebound, no guarding   :  No costovertebral angle tenderness   Integument:  Well hydrated, no rash or telangiectasias  Lymphatic:  No lymphadenopathy noted   Neurologic:   Alert & oriented x 3, CN 2-12 normal, no focal deficits noted. Sensations Intact. Muscle strength 5/5 proximallyand distally in upper and lower extremities.    Psychiatric:  Speech and behavior appropriate           LABS AND IMAGING  Outside data reviewed and in HPI    Lab Results   Component Value Date    WBC 5.2 07/22/2021    RBC 4.31 07/22/2021    HGB 13.7 07/22/2021    HCT 39.5 07/22/2021     07/22/2021    MCV 91.6 07/22/2021    MCH 31.9 07/22/2021    MCHC 34.8 07/22/2021    RDW 13.4 07/22/2021    SEGSPCT 63.2 05/23/2013    LYMPHOPCT 30.6 02/22/2018    MONOPCT 8.7 02/22/2018    BASOPCT 0.9 02/22/2018    MONOSABS 0.5 02/22/2018    LYMPHSABS 1.8 02/22/2018    EOSABS 0.1 02/22/2018    BASOSABS 0.1 02/22/2018    DIFFTYPE Auto-K 05/23/2013       Chemistry        Component Value Date/Time     07/22/2021 0954    K 5.3 (H) 07/22/2021 0954     07/22/2021 0954    CO2 26 07/22/2021 0954    BUN 11 07/22/2021 0954    CREATININE 0.5 (L) 07/22/2021 0954        Component Value Date/Time    CALCIUM 9.2 07/22/2021 0954    ALKPHOS 103 06/24/2021 1205    AST 24 06/24/2021 1205    ALT 13 06/24/2021 1205    BILITOT 0.3 06/24/2021 1205          Lab Results   Component Value Date    SEDRATE 18 05/11/2020     Lab Results   Component Value Date    CRP <0.3 05/11/2020     No results found for: SANDRA, JHONNY, SSA, SSB, C3, C4  Lab Results   Component Value Date    RF 26.0 05/11/2020     No results found for: SANDRA, ANATITER, ANAINT, PATH  No results found for: DSDNAG, DSDNAIGGIFA  No results found for: SSAROAB, SSALAAB  No results found for: SMAB, RNPAB  No results found for: CENTABIGG  No results found for: C3, C4, ACE  Lab Results   Component Value Date    VITD25 66.9 06/24/2021     No results found for: LABURIC, URICACID  No results found for: Vargas Gutierrez  Lab Results   Component Value Date    TSHFT4 1.11 03/11/2020    TSH 1.45 03/13/2015     Lab Results   Component Value Date    VITD25 66.9 06/24/2021       Radiology:    EXAMINATION:   BONE DENSITOMETRY       2/20/2020 7:50 am       TECHNIQUE:   A bone density DEXA scan was performed of the lumbar spine and bilateral hips   on a Hologic system.       COMPARISON:   None.       HISTORY:   ORDERING SYSTEM PROVIDED HISTORY: Bone disorder       FINDINGS:   LUMBAR SPINE:       The bone mineral density in the lumbar spine including the L1 thru L4 levels   is measured at 0.629 g/cm2, which corresponds to a T-score of  -3.8 and a   Z-score of -2.3.  This is within the osteoporosis range by WHO criteria.       LEFT HIP:       The bone mineral density in the total hip is measured at 0.613 g/cm2   corresponding to a T-score of -2.7 and a Z-score of -1.7.  This is within the   osteoporosis range by WHO criteria.       The bone mineral density of the femoral neck is measured at 0.515 g/cm2   corresponding to a T-score of -3.0 and a Z-score of -1.7.  This is within the   osteoporosis range by WHO criteria.       RIGHT HIP:       The bone mineral density in the total hip is measured at 0.596 g/cm2   corresponding to a T-score of -2.8 and a Z-score of -1.8.  This is within the   osteoporosis range by WHO criteria.       The bone mineral density of the femoral neck is measured at 0.519 g/cm2   corresponding to a T-score of -3.0 and a Z-score of -1.6.  This is within the   osteoporosis range by [facility] criteria.           Impression   Osteoporosis by WHO criteria.           5/12/2020     FINDINGS:   Left knee:       The bones are intact.  The joint spaces are preserved.  No joint effusion is   seen.       Left hand:       The bones are intact.  Mild osteoarthritis 1st carpometacarpal joint is   present.  No erosive changes are seen.  No chondrocalcinosis is noted.       Right knee:       The bones are intact.  The joint spaces are preserved.  No joint effusion is   seen.  Minimal atherosclerotic vascular calcifications are present.       Right hand:       The bones are intact.  Minimal degenerative change 1st carpometacarpal joint   is present.  No marginal erosions are seen.  No chondrocalcinosis is noted. Mild degenerative spurring at the DIP joint right index finger is noted.           Impression   No acute osseous injury of either knee or hand.       Osteoarthritis of both hands as noted above.             ASSESSMENT AND PLAN      Assessment/Plan:      ASSESSMENT:    1. Age-related osteoporosis without current pathological fracture    2. Primary osteoarthritis involving multiple joints        PLAN:   1. Age-related osteoporosis without current pathological fracture  DEXA scan with T score of -3.8 at the lumbar spine, -3 at the right and left femoral neck. No history of fragility fractures. No family history of fractures. TSH, PTH vitamin D normal  Received Reclast infusion June 23, 2020 no side effects, no recent fragility fractures. Overdue for Reclast infusion. Will place an order. Continue calcium with vitamin D  Repeat DEXA scan in 2022  - Comprehensive Metabolic Panel; Future    2. Primary osteoarthritis involving multiple joints  RF, CCP negative, hand and knee x-rays with degenerative changes. Doubt rheumatoid arthritis  Stable  Naproxen as needed  Osteoarthritis/ Joint pain : Discussed about diagnosis and management of osteoarthritis. There are no FDA approved disease modifying agents. Goal is to control pain and increase mobility. Discussed the importance of wt loss, exercises, formal PT, joint braces/splints. First line of agent Tylenol arthritis, NSAIDs systemic and/ or topical,Cymbalta, pain meds, joint injections- steroids, and visco supplementaiton for knee OA. Also discussed about surgical options.         The patient indicates understanding of these issues and agrees with the plan.      Return in about 6 months (around 2/13/2022). The risks and benefits of my recommendations, as well as other treatment options, benefits and side effects werediscussed with the patient. All questions were answered. ######################################################################    I thank you for giving me theopportunity to participate in [facility]. If you have any questions or concerns please feel free to contact me. I look forward to following  Henrietta Tinoco along with you. Electronically signed by: Dalton Ariza MD, MD, 8/13/2021 7:46 AM    Documentation was done using voice recognition dragon software. Every effort was made to ensure accuracy;however, inadvertent unintentional computerized transcription errors may be present.

## 2021-08-13 ENCOUNTER — OFFICE VISIT (OUTPATIENT)
Dept: RHEUMATOLOGY | Age: 61
End: 2021-08-13
Payer: MEDICARE

## 2021-08-13 VITALS
SYSTOLIC BLOOD PRESSURE: 116 MMHG | HEART RATE: 84 BPM | DIASTOLIC BLOOD PRESSURE: 68 MMHG | WEIGHT: 108.4 LBS | BODY MASS INDEX: 18.04 KG/M2

## 2021-08-13 DIAGNOSIS — M15.9 PRIMARY OSTEOARTHRITIS INVOLVING MULTIPLE JOINTS: ICD-10-CM

## 2021-08-13 DIAGNOSIS — M81.0 AGE-RELATED OSTEOPOROSIS WITHOUT CURRENT PATHOLOGICAL FRACTURE: Primary | ICD-10-CM

## 2021-08-13 PROCEDURE — G8419 CALC BMI OUT NRM PARAM NOF/U: HCPCS | Performed by: INTERNAL MEDICINE

## 2021-08-13 PROCEDURE — 3017F COLORECTAL CA SCREEN DOC REV: CPT | Performed by: INTERNAL MEDICINE

## 2021-08-13 PROCEDURE — 99213 OFFICE O/P EST LOW 20 MIN: CPT | Performed by: INTERNAL MEDICINE

## 2021-08-13 PROCEDURE — G8427 DOCREV CUR MEDS BY ELIG CLIN: HCPCS | Performed by: INTERNAL MEDICINE

## 2021-08-13 PROCEDURE — 4004F PT TOBACCO SCREEN RCVD TLK: CPT | Performed by: INTERNAL MEDICINE

## 2021-08-13 RX ORDER — ZOLEDRONIC ACID 5 MG/100ML
5 INJECTION, SOLUTION INTRAVENOUS ONCE
Qty: 100 ML | Refills: 0 | Status: ON HOLD | OUTPATIENT
Start: 2021-08-13 | End: 2022-02-07

## 2021-08-16 ENCOUNTER — HOSPITAL ENCOUNTER (OUTPATIENT)
Age: 61
Discharge: HOME | End: 2021-08-16
Payer: MEDICARE

## 2021-08-16 ENCOUNTER — TELEPHONE (OUTPATIENT)
Dept: RHEUMATOLOGY | Age: 61
End: 2021-08-16

## 2021-08-16 DIAGNOSIS — M81.0 AGE-RELATED OSTEOPOROSIS WITHOUT CURRENT PATHOLOGICAL FRACTURE: ICD-10-CM

## 2021-08-16 LAB
A/G RATIO: 1.5 (ref 1.1–2.2)
ALBUMIN SERPL-MCNC: 4.1 G/DL (ref 3.4–5)
ALP BLD-CCNC: 107 U/L (ref 40–129)
ALT SERPL-CCNC: 18 U/L (ref 10–40)
ANION GAP SERPL CALCULATED.3IONS-SCNC: 12 MMOL/L (ref 3–16)
AST SERPL-CCNC: 27 U/L (ref 15–37)
BILIRUB SERPL-MCNC: 0.3 MG/DL (ref 0–1)
BUN BLDV-MCNC: 9 MG/DL (ref 7–20)
CALCIUM SERPL-MCNC: 8.7 MG/DL (ref 8.3–10.6)
CHLORIDE BLD-SCNC: 105 MMOL/L (ref 99–110)
CO2: 24 MMOL/L (ref 21–32)
CREAT SERPL-MCNC: 0.5 MG/DL (ref 0.6–1.2)
GFR AFRICAN AMERICAN: >60
GFR NON-AFRICAN AMERICAN: >60
GLOBULIN: 2.7 G/DL
GLUCOSE BLD-MCNC: 100 MG/DL (ref 70–99)
POTASSIUM SERPL-SCNC: 3.8 MMOL/L (ref 3.5–5.1)
SODIUM BLD-SCNC: 141 MMOL/L (ref 136–145)
TOTAL PROTEIN: 6.8 G/DL (ref 6.4–8.2)

## 2021-08-16 PROCEDURE — 80053 COMPREHEN METABOLIC PANEL: CPT

## 2021-08-16 PROCEDURE — 36415 COLL VENOUS BLD VENIPUNCTURE: CPT

## 2021-08-16 NOTE — TELEPHONE ENCOUNTER
Please obtain VOB and PA for IV reclast infusion.      Last infusion: Reclast #1 06/23/2020  Labs: ordered 08/16/2021  Last Dexa Scan:02/20/2020  DX: M81.0 Age-related osteoporosis without current pathological fracture

## 2021-09-01 ENCOUNTER — NURSE ONLY (OUTPATIENT)
Dept: RHEUMATOLOGY | Age: 61
End: 2021-09-01
Payer: MEDICARE

## 2021-09-01 VITALS
RESPIRATION RATE: 16 BRPM | DIASTOLIC BLOOD PRESSURE: 64 MMHG | SYSTOLIC BLOOD PRESSURE: 110 MMHG | HEART RATE: 72 BPM | TEMPERATURE: 98 F

## 2021-09-01 DIAGNOSIS — M81.0 AGE-RELATED OSTEOPOROSIS WITHOUT CURRENT PATHOLOGICAL FRACTURE: Primary | ICD-10-CM

## 2021-09-01 PROCEDURE — 96365 THER/PROPH/DIAG IV INF INIT: CPT | Performed by: INTERNAL MEDICINE

## 2021-09-01 RX ORDER — ZOLEDRONIC ACID 5 MG/100ML
5 INJECTION, SOLUTION INTRAVENOUS ONCE
Status: COMPLETED | OUTPATIENT
Start: 2021-09-01 | End: 2021-09-01

## 2021-09-01 RX ADMIN — ZOLEDRONIC ACID 5 MG: 5 INJECTION, SOLUTION INTRAVENOUS at 08:16

## 2021-09-01 NOTE — PATIENT INSTRUCTIONS
Patient Education        zoledronic acid  Pronunciation:  JENNIE Lord AS id  Brand:  Reclast, Zometa  What is the most important information I should know about zoledronic acid? Do not use if you are pregnant. Use effective birth control, and ask your doctor how long to prevent pregnancy after you stop using zoledronic acid. Zoledronic acid can cause serious kidney problems,  especially if you are dehydrated, if you take diuretic medicine, or if you already have kidney disease. Call your doctor if you urinate less than usual, if you have swelling in your feet or ankles, or if you feel tired or short of breath. Also call your doctor if you have muscle spasms, numbness or tingling (in hands and feet or around the mouth), new or unusual hip pain, or severe pain in your joints, bones, or muscles. What is zoledronic acid? Reclast and Zometa are two different brands of zoledronic acid. Reclast is used to treat or prevent osteoporosis caused by menopause, or steroid use. This medicine also increases bone mass in men with osteoporosis. Reclast is for use when you have a high risk of bone fracture. Reclast is also used to treat Paget's disease of bone. Zometa is used to treat high blood levels of calcium caused by cancer (also called hypercalcemia of malignancy). This medicine also treats multiple myeloma (a type of bone marrow cancer) or bone cancer that has spread from elsewhere in the body. You should not use Reclast and Zometa at the same time. Zoledronic acid may also be used for purposes not listed in this medication guide. What should I discuss with my healthcare provider before receiving zoledronic acid? You should not be treated with zoledronic acid if you are allergic to it. You also should not receive Reclast if you have:  · low levels of calcium in your blood (hypocalcemia); or  · severe kidney disease.   You should not be treated with zoledronic acid if are currently using any other bisphosphonate (such as alendronate, etidronate, ibandronate, pamidronate, risedronate, or tiludronate). Tell your doctor if you have ever had:  · kidney disease;  · hypocalcemia;  · thyroid or parathyroid surgery;  · surgery to remove part of your intestine;  · asthma caused by taking aspirin;  · any condition that makes it hard for your body to absorb nutrients from food (malabsorption);  · a dental problem (you may need a dental exam before you receive zoledronic acid);  · if you are dehydrated; or  · if you take a diuretic or \"water pill\". Zoledronic acid can cause serious kidney problems,  especially if you are dehydrated, if you take diuretic medicine, or if you already have kidney disease. This medicine may cause jaw bone problems (osteonecrosis). The risk is highest in people with cancer, blood cell disorders, pre-existing dental problems, or people treated with steroids, chemotherapy, or radiation. Ask your doctor about your own risk. You may need to have a negative pregnancy test before starting this treatment. Do not use if you are pregnant. This medicine may harm an unborn baby or cause birth defects. Zoledronic acid can remain in your body for weeks or years after your last dose. Use effective birth control to prevent pregnancy while using this medicine. Talk with your doctor about the need to prevent pregnancy after you stop using zoledronic acid. This medicine may affect fertility (ability to have children) in women. However, it is important to use birth control to prevent pregnancy because zoledronic acid can harm an unborn baby. You should not breastfeed while using zoledronic acid. How is zoledronic acid given? Zoledronic acid is given as an infusion into a vein. A healthcare provider will give you this injection. Zoledronic acid is sometimes given as a single dose only one time. It may also be given once every 1 or 2 years.  How often you receive zoledronic acid will depend on why you are using this medicine. Follow your doctor's instructions. Drink at least 2 glasses of water within a few hours before your injection to keep from getting dehydrated. You will need frequent medical tests. Pay special attention to your dental hygiene while using zoledronic acid. Brush and floss your teeth regularly. If you need to have any dental work (especially surgery), tell the dentist ahead of time that you are using zoledronic acid. Zoledronic acid is only part of a complete program of treatment that may also include diet changes and taking calcium and vitamin supplements. Follow your doctor's instructions very closely. Your doctor will determine how long to treat you with this medicine. Zoledronic acid is often given for only 3 to 5 years. What happens if I miss a dose? Call your doctor for instructions if you miss an appointment for your zoledronic acid injection. What happens if I overdose? Seek emergency medical attention or call the [facility] Help line at [phone]. What should I avoid while receiving zoledronic acid? Avoid smoking, or try to quit. Smoking can reduce your bone mineral density, making fractures more likely. Avoid drinking large amounts of alcohol. Heavy drinking can also cause bone loss. What are the possible side effects of zoledronic acid? Get emergency medical help if you have signs of an allergic reaction: hives; wheezing, chest tightness, trouble breathing; swelling of your face, lips, tongue, or throat.   Call your doctor at once if you have:  · new or unusual pain in your thigh or hip;  · jaw pain or numbness, red or swollen gums, loose teeth, or slow healing after dental work;  · severe joint, bone, or muscle pain;  · kidney problems --little or no urination, swelling in your feet or ankles, feeling tired;  · low red blood cells (anemia) --pale skin, unusual tiredness, feeling light-headed or short of breath, cold hands and feet; or  · low calcium levels --muscle spasms or contractions, numbness or tingly feeling (around your mouth, or in your fingers and toes). Serious side effects on the kidneys may be more likely in older adults. Common side effects may include:  · trouble breathing;  · nausea, vomiting, diarrhea, constipation;  · bone pain, muscle or joint pain;  · fever or other flu symptoms;  · tiredness;  · eye pain or swelling;  · pain in your arms or legs;  · headache; or  · anemia. This is not a complete list of side effects and others may occur. Call your doctor for medical advice about side effects. You may report side effects to FDA at 0-605-BPF-7398. What other drugs will affect zoledronic acid? Zoledronic acid can harm your kidneys, especially if you also use certain medicines for infections, cancer, osteoporosis, organ transplant rejection, bowel disorders, high blood pressure, or pain or arthritis (including Advil, Motrin, and Aleve). Other drugs may affect zoledronic acid, including prescription and over-the-counter medicines, vitamins, and herbal products. Tell your doctor about all your current medicines and any medicine you start or stop using. Where can I get more information? Your doctor or pharmacist can provide more information about zoledronic acid. Remember, keep this and all other medicines out of the reach of children, never share your medicines with others, and use this medication only for the indication prescribed. Every effort has been made to ensure that the information provided by [facility] [address] is accurate, up-to-date, and complete, but no guarantee is made to that effect. Drug information contained herein may be time sensitive. [facility] information has been compiled for use by healthcare practitioners and consumers in the [place] and therefore [facility] does not warrant that uses outside of the [place] are appropriate, unless specifically indicated otherwise.  [facility]'s drug information does not endorse drugs, diagnose patients or recommend therapy. [facility]'s drug information is an informational resource designed to assist licensed healthcare practitioners in caring for their patients and/or to serve consumers viewing this service as a supplement to, and not a substitute for, the expertise, skill, knowledge and judgment of healthcare practitioners. The absence of a warning for a given drug or drug combination in no way should be construed to indicate that the drug or drug combination is safe, effective or appropriate for any given patient. [facility] does not assume any responsibility for any aspect of healthcare administered with the aid of information [facility] provides. The information contained herein is not intended to cover all possible uses, directions, precautions, warnings, drug interactions, allergic reactions, or adverse effects. If you have questions about the drugs you are taking, check with your doctor, nurse or pharmacist.  Copyright 0029-3152 [address]: 18.01. Revision date: 2/26/2021. Care instructions adapted under license by [facility] ([facility]). If you have questions about a medical condition or this instruction, always ask your healthcare professional. [place] [address] any warranty or liability for your use of this information.

## 2021-09-01 NOTE — PROGRESS NOTES
Pt here for Reclast  infusion #2, no follow up visit with Dr. Linda Oconnor, today. No  Adverse effects from previous infusion, no blood work drawn. Infusion  tolerated well. Education provided and patient discharged ambulatory.      IV Gauge: #24  IV Site: right A/C  # of Attempts: 1  IV Start: 0809 am  IV Stop: 0826 am

## 2021-11-02 NOTE — PROGRESS NOTES
[address] [facility]  1960 November 5, 2021       CC: \"I feel well\"     HPI:  The patient is 57 y.o. female with a past medical history significant for nonobstructive CAD and hyperlipidemia. She reports a remote history of a prior blood clot with no known cause. She presented to the office with complaints of chest pain and underwent GXT stress test with abnormal results. She completed a left heart heart catheterization on 8/2/21 revealing nonobstructive coronary disease. She presents today for follow up. She denies any chest pain with rest or exertion. Her breathing has been comfortable without shortness of breath. Patient denies exertional chest pain/pressure, dyspnea at rest, GARCIA, PND, orthopnea, palpitations, lightheadedness, weight changes, changes in LE edema, and syncope. She reports medication compliance and is tolerating. She is a current smoker. Review of Systems:  Constitutional: No fatigue, weakness, night sweats or fever. HEENT: No new vision difficulties or ringing in the ears. Respiratory: No new SOB, PND, orthopnea or cough. Cardiovascular: See HPI   GI: No n/v, diarrhea, constipation, abdominal pain or changes in bowel habits. No melena, no hematochezia  : No urinary frequency, urgency, incontinence, hematuria or dysuria. Skin: No cyanosis or skin lesions. Musculoskeletal: No new muscle or joint pain. Neurological: No syncope or TIA-like symptoms.   Psychiatric: No anxiety, insomnia or depression     Past Medical History:   Diagnosis Date    Abnormal screening mammogram 7/31/2017    Dx right pending    Anxiety     Arthritis     Asthma     Cholelithiases 3/17/2021    Colon polyps 10/30/2017    Multiple path pending 10.2017  Recheck 10.2020    Depression     Headache 8/21/2019    History of abnormal mammogram 7/31/2017    birads 3 right recheck 8.2018 9.2019 recheck 1 year 10.2020 birad 1    Hx of adenomatous colonic polyps 10/30/2017    adenoma 10.2017  Recheck 10.2020    Multinodular thyroid 7/25/2017 7.2017 euthyroid recheck 1 year    Oral phase dysphagia 4/28/2021    Other osteoporosis without current pathological fracture 2/21/2020 2.2020  T-score of  -3.8 and a Z-score of -2.3.  spine; hip -3.0 and a Z-score of -1.7     Past Surgical History:   Procedure Laterality Date    TONSILLECTOMY      TUBAL LIGATION       Family History   Problem Relation Age of Onset    COPD Father     Breast Cancer Sister     Heart Attack Brother    She reports her brother suffering a heart attack in his mid 46s. She denies cardiac history in either of her parents.      Social History     Tobacco Use    Smoking status: Current Every Day Smoker     Packs/day: 1.00     Years: 50.00     Pack years: 50.00     Types: Cigarettes    Smokeless tobacco: Never Used    Tobacco comment: cutting back now at 1/2 pack per day   Vaping Use    Vaping Use: Never used   Substance Use Topics    Alcohol use: No    Drug use: No       Allergies   Allergen Reactions    Pcn [Penicillins]      Current Outpatient Medications   Medication Sig Dispense Refill    aspirin (ASPIRIN CHILDRENS) 81 MG chewable tablet Take 1 tablet by mouth daily 30 tablet 3    metoprolol succinate (TOPROL XL) 25 MG extended release tablet Take 1 tablet by mouth daily 30 tablet 3    atorvastatin (LIPITOR) 80 MG tablet TAKE ONE TABLET BY MOUTH DAILY 90 tablet 3    albuterol (PROVENTIL) (2.5 MG/3ML) 0.083% nebulizer solution Take 3 mLs by nebulization every 4 hours as needed for Wheezing 120 mL 5    albuterol sulfate HFA (VENTOLIN HFA) 108 (90 Base) MCG/ACT inhaler Inhale 2 puffs into the lungs every 6 hours as needed for Wheezing 3 Inhaler 3    tiotropium (SPIRIVA RESPIMAT) 2.5 MCG/ACT AERS inhaler INHALE 2 PUFFS INTO THE LUNGS EVERY DAY 3 Inhaler 3    Fluticasone furoate-vilanterol (BREO ELLIPTA) 200-25 MCG/INH AEPB inhaler Inhale 1 puff into the lungs daily One puff daily 3 each intervals and axis  EKG Interpretation 11/5/21:       Procedures:     [facility] 8/2/21  1. Right-dominant coronary arterial circulation. There is no  angiographic evidence of coronary atherosclerosis in the right coronary  artery. The left main and the circumflex arteries are free of disease. There is an angiographic 60% lesion in the mid left anterior descending  artery and a 50% lesion more distal to this. This was nonsignificant by  fractional flow reserve with a ratio of 0.85.  2.  Normal left ventricular end-diastolic pressure at 10 mmHg. 3.  Normal left ventricular systolic function. LV ejection fraction of  60%. 4.  No gradient across the aortic valve on pullback to suggest aortic  stenosis. Imaging:     Stress echo 10/9/14   Summary   Normal stress echocardiogram study. Chest CT 2/19/21  Atherosclerotic change seen in   aorta.  Coronary artery calcification is seen. GXT stress 7/14/21  Abnormal EKG with NSVT in recovery. Recommend coronary angiogram to evaluate  for ischemia. Discussed with Dr. Farooq Gama. Lab Review:   Lab Results   Component Value Date    TRIG 99 06/24/2021    HDL 43 06/24/2021    LDLCALC 64 06/24/2021    LABVLDL 20 06/24/2021     Lab Results   Component Value Date     08/16/2021    K 3.8 08/16/2021    BUN 9 08/16/2021    CREATININE 0.5 08/16/2021         Assessment:  1. CAD of native coronary arteries without angina, nonobstructive   2. Hyperlipidemia with LDL goal <70 mg/dL   3. Tobacco Abuse    Plan:  I think that Ms. Tash Garland  is entirely stable from a cardiovascular standpoint. I see no need to make any changes currently in her medical regimen or pursue further testing. She is not endorsing any symptoms representing angina and her blood pressure is well controlled today in the office. Her most recent lipid profile was favorable and she will remain on her current statin therapy. I have encouraged her in smoking cessation and increasing her aerobic activity.  I have

## 2021-11-05 ENCOUNTER — OFFICE VISIT (OUTPATIENT)
Dept: CARDIOLOGY CLINIC | Age: 61
End: 2021-11-05
Payer: MEDICARE

## 2021-11-05 VITALS
BODY MASS INDEX: 18.44 KG/M2 | HEART RATE: 82 BPM | HEIGHT: 64 IN | SYSTOLIC BLOOD PRESSURE: 102 MMHG | WEIGHT: 108 LBS | OXYGEN SATURATION: 98 % | DIASTOLIC BLOOD PRESSURE: 82 MMHG

## 2021-11-05 DIAGNOSIS — Z72.0 TOBACCO ABUSE: ICD-10-CM

## 2021-11-05 DIAGNOSIS — E78.5 HYPERLIPIDEMIA LDL GOAL <70: ICD-10-CM

## 2021-11-05 DIAGNOSIS — I25.10 CORONARY ARTERY DISEASE INVOLVING NATIVE CORONARY ARTERY OF NATIVE HEART WITHOUT ANGINA PECTORIS: Primary | ICD-10-CM

## 2021-11-05 PROCEDURE — 4004F PT TOBACCO SCREEN RCVD TLK: CPT | Performed by: INTERNAL MEDICINE

## 2021-11-05 PROCEDURE — 3017F COLORECTAL CA SCREEN DOC REV: CPT | Performed by: INTERNAL MEDICINE

## 2021-11-05 PROCEDURE — 93000 ELECTROCARDIOGRAM COMPLETE: CPT | Performed by: INTERNAL MEDICINE

## 2021-11-05 PROCEDURE — G8428 CUR MEDS NOT DOCUMENT: HCPCS | Performed by: INTERNAL MEDICINE

## 2021-11-05 PROCEDURE — 99214 OFFICE O/P EST MOD 30 MIN: CPT | Performed by: INTERNAL MEDICINE

## 2021-11-05 PROCEDURE — G8420 CALC BMI NORM PARAMETERS: HCPCS | Performed by: INTERNAL MEDICINE

## 2021-11-05 PROCEDURE — G8484 FLU IMMUNIZE NO ADMIN: HCPCS | Performed by: INTERNAL MEDICINE

## 2021-11-17 RX ORDER — METOPROLOL SUCCINATE 25 MG/1
TABLET, EXTENDED RELEASE ORAL
Qty: 90 TABLET | Refills: 3 | Status: ON HOLD
Start: 2021-11-17 | End: 2022-02-09 | Stop reason: HOSPADM

## 2021-12-14 ENCOUNTER — OFFICE VISIT (OUTPATIENT)
Dept: PULMONOLOGY | Age: 61
End: 2021-12-14
Payer: MEDICARE

## 2021-12-14 VITALS
OXYGEN SATURATION: 99 % | HEART RATE: 88 BPM | DIASTOLIC BLOOD PRESSURE: 70 MMHG | BODY MASS INDEX: 17.96 KG/M2 | HEIGHT: 64 IN | SYSTOLIC BLOOD PRESSURE: 100 MMHG | TEMPERATURE: 97.4 F | WEIGHT: 105.2 LBS | RESPIRATION RATE: 16 BRPM

## 2021-12-14 DIAGNOSIS — Z72.0 TOBACCO ABUSE: Primary | ICD-10-CM

## 2021-12-14 DIAGNOSIS — Z23 NEED FOR INFLUENZA VACCINATION: ICD-10-CM

## 2021-12-14 DIAGNOSIS — J44.9 COPD, SEVERE (HCC): ICD-10-CM

## 2021-12-14 PROCEDURE — 3017F COLORECTAL CA SCREEN DOC REV: CPT | Performed by: INTERNAL MEDICINE

## 2021-12-14 PROCEDURE — G8427 DOCREV CUR MEDS BY ELIG CLIN: HCPCS | Performed by: INTERNAL MEDICINE

## 2021-12-14 PROCEDURE — 3023F SPIROM DOC REV: CPT | Performed by: INTERNAL MEDICINE

## 2021-12-14 PROCEDURE — G8419 CALC BMI OUT NRM PARAM NOF/U: HCPCS | Performed by: INTERNAL MEDICINE

## 2021-12-14 PROCEDURE — 4004F PT TOBACCO SCREEN RCVD TLK: CPT | Performed by: INTERNAL MEDICINE

## 2021-12-14 PROCEDURE — G0008 ADMIN INFLUENZA VIRUS VAC: HCPCS | Performed by: INTERNAL MEDICINE

## 2021-12-14 PROCEDURE — 90674 CCIIV4 VAC NO PRSV 0.5 ML IM: CPT | Performed by: INTERNAL MEDICINE

## 2021-12-14 PROCEDURE — G8482 FLU IMMUNIZE ORDER/ADMIN: HCPCS | Performed by: INTERNAL MEDICINE

## 2021-12-14 PROCEDURE — 99214 OFFICE O/P EST MOD 30 MIN: CPT | Performed by: INTERNAL MEDICINE

## 2021-12-14 RX ORDER — ALBUTEROL SULFATE 90 UG/1
2 AEROSOL, METERED RESPIRATORY (INHALATION) 4 TIMES DAILY
Qty: 3 EACH | Refills: 3 | Status: SHIPPED | OUTPATIENT
Start: 2021-12-14 | End: 2022-06-21 | Stop reason: SDUPTHER

## 2021-12-14 NOTE — PROGRESS NOTES
Chief complaint  This is a 57y.o. year old female  who comes to see me with a chief complaint of   Chief Complaint   Patient presents with    COPD    Follow-up       HPI  Here with a cc of COPD. She is doing better. Did run out of her Breo and Spiriva last week. Has had to rely on albuterol alone. Has been using it more. Cutting back on smoking again. Used to smoke 5 ppd.   Down to less than 1/2 ppd, maybe less    COVID vaccine up to date but has not had flu shot      Current Outpatient Medications:     tiotropium (SPIRIVA RESPIMAT) 2.5 MCG/ACT AERS inhaler, INHALE 2 PUFFS INTO THE LUNGS EVERY DAY, Disp: 3 each, Rfl: 3    Fluticasone furoate-vilanterol (BREO ELLIPTA) 200-25 MCG/INH AEPB inhaler, Inhale 1 puff into the lungs daily One puff daily, Disp: 3 each, Rfl: 3    albuterol sulfate HFA (VENTOLIN HFA) 108 (90 Base) MCG/ACT inhaler, Inhale 2 puffs into the lungs 4 times daily, Disp: 3 each, Rfl: 3    metoprolol succinate (TOPROL XL) 25 MG extended release tablet, TAKE ONE TABLET BY MOUTH DAILY, Disp: 90 tablet, Rfl: 3    aspirin (ASPIRIN CHILDRENS) 81 MG chewable tablet, Take 1 tablet by mouth daily, Disp: 30 tablet, Rfl: 3    atorvastatin (LIPITOR) 80 MG tablet, TAKE ONE TABLET BY MOUTH DAILY, Disp: 90 tablet, Rfl: 3    albuterol (PROVENTIL) (2.5 MG/3ML) 0.083% nebulizer solution, Take 3 mLs by nebulization every 4 hours as needed for Wheezing, Disp: 120 mL, Rfl: 5    ezetimibe (ZETIA) 10 MG tablet, Take 1 tablet by mouth daily In addition to atorvastatin for cholesterol, Disp: 90 tablet, Rfl: 0    vitamin D (ERGOCALCIFEROL) 1.25 MG (88599 UT) CAPS capsule, 1 po q 2 weeks, Disp: 6 capsule, Rfl: 3    calcium gluconate 500 MG tablet, Take 1 tablet by mouth daily, Disp: 30 tablet, Rfl: 5    zoledronic acid (RECLAST) 5 MG/100ML SOLN, Infuse 100 mLs intravenously once for 1 dose, Disp: 100 mL, Rfl: 0    PHYSICAL EXAM:  Vitals:    12/14/21 0759   BP: 100/70   Pulse: 88   Resp: 16   Temp: Dispense: 3 each; Refill: 3  - albuterol sulfate HFA (VENTOLIN HFA) 108 (90 Base) MCG/ACT inhaler; Inhale 2 puffs into the lungs 4 times daily  Dispense: 3 each; Refill: 3    2. Tobacco abuse  Cutting down each time I see her. Max was 5 ppd! 3. Need for influenza vaccination  - INFLUENZA, MDCK QUADV, 2 YRS AND OLDER, IM, PF, PREFILL SYR OR SDV, 0.5ML (FLUCELVAX QUADV, PF)    4.  Lung Nodule  Stable on last several CT scans.   Likely granuloma     Follow up in 6 months    Pulmonary Rehab:  Declined in the past    Lung Cancer Screening CT:  6/21

## 2021-12-16 ENCOUNTER — HOSPITAL ENCOUNTER (OUTPATIENT)
Dept: WOMENS IMAGING | Age: 61
Discharge: HOME | End: 2021-12-16
Payer: MEDICARE

## 2021-12-16 VITALS — WEIGHT: 105 LBS | HEIGHT: 64 IN | BODY MASS INDEX: 17.93 KG/M2

## 2021-12-16 DIAGNOSIS — Z12.31 ENCOUNTER FOR SCREENING MAMMOGRAM FOR MALIGNANT NEOPLASM OF BREAST: ICD-10-CM

## 2021-12-16 PROCEDURE — 77063 BREAST TOMOSYNTHESIS BI: CPT

## 2022-01-31 ENCOUNTER — OFFICE VISIT (OUTPATIENT)
Dept: FAMILY MEDICINE CLINIC | Age: 62
End: 2022-01-31
Payer: MEDICARE

## 2022-01-31 VITALS
HEART RATE: 100 BPM | SYSTOLIC BLOOD PRESSURE: 102 MMHG | WEIGHT: 107 LBS | RESPIRATION RATE: 16 BRPM | BODY MASS INDEX: 18.27 KG/M2 | HEIGHT: 64 IN | DIASTOLIC BLOOD PRESSURE: 67 MMHG | OXYGEN SATURATION: 96 %

## 2022-01-31 DIAGNOSIS — J44.9 COPD, SEVERE (HCC): ICD-10-CM

## 2022-01-31 DIAGNOSIS — Z86.010 HX OF ADENOMATOUS COLONIC POLYPS: ICD-10-CM

## 2022-01-31 DIAGNOSIS — K80.20 CALCULUS OF GALLBLADDER WITHOUT CHOLECYSTITIS WITHOUT OBSTRUCTION: ICD-10-CM

## 2022-01-31 DIAGNOSIS — F33.40 MDD (RECURRENT MAJOR DEPRESSIVE DISORDER) IN REMISSION (HCC): ICD-10-CM

## 2022-01-31 DIAGNOSIS — J43.1 PANLOBULAR EMPHYSEMA (HCC): ICD-10-CM

## 2022-01-31 DIAGNOSIS — I47.29 NSVT (NONSUSTAINED VENTRICULAR TACHYCARDIA): ICD-10-CM

## 2022-01-31 DIAGNOSIS — E78.01 FAMILIAL HYPERCHOLESTEROLEMIA: ICD-10-CM

## 2022-01-31 DIAGNOSIS — F17.200 TOBACCO DEPENDENCE: ICD-10-CM

## 2022-01-31 DIAGNOSIS — M81.0 OSTEOPOROSIS, UNSPECIFIED OSTEOPOROSIS TYPE, UNSPECIFIED PATHOLOGICAL FRACTURE PRESENCE: Primary | ICD-10-CM

## 2022-01-31 DIAGNOSIS — E46 MALNUTRITION, UNSPECIFIED TYPE (HCC): ICD-10-CM

## 2022-01-31 DIAGNOSIS — L72.0 EPIDERMAL CYST OF NECK: ICD-10-CM

## 2022-01-31 DIAGNOSIS — E04.2 MULTINODULAR THYROID: ICD-10-CM

## 2022-01-31 DIAGNOSIS — E55.9 VITAMIN D DEFICIENCY: ICD-10-CM

## 2022-01-31 PROBLEM — R03.0 ELEVATED BLOOD PRESSURE READING: Status: RESOLVED | Noted: 2019-11-13 | Resolved: 2022-01-31

## 2022-01-31 LAB
A/G RATIO: 1.8 (ref 1.1–2.2)
ALBUMIN SERPL-MCNC: 4.4 G/DL (ref 3.4–5)
ALP BLD-CCNC: 93 U/L (ref 40–129)
ALT SERPL-CCNC: 15 U/L (ref 10–40)
ANION GAP SERPL CALCULATED.3IONS-SCNC: 13 MMOL/L (ref 3–16)
AST SERPL-CCNC: 24 U/L (ref 15–37)
BILIRUB SERPL-MCNC: <0.2 MG/DL (ref 0–1)
BUN BLDV-MCNC: 10 MG/DL (ref 7–20)
CALCIUM SERPL-MCNC: 9.1 MG/DL (ref 8.3–10.6)
CHLORIDE BLD-SCNC: 101 MMOL/L (ref 99–110)
CO2: 23 MMOL/L (ref 21–32)
CREAT SERPL-MCNC: 0.7 MG/DL (ref 0.6–1.2)
GFR AFRICAN AMERICAN: >60
GFR NON-AFRICAN AMERICAN: >60
GLUCOSE BLD-MCNC: 90 MG/DL (ref 70–99)
POTASSIUM SERPL-SCNC: 4 MMOL/L (ref 3.5–5.1)
SODIUM BLD-SCNC: 137 MMOL/L (ref 136–145)
TOTAL PROTEIN: 6.8 G/DL (ref 6.4–8.2)
TSH REFLEX FT4: 0.57 UIU/ML (ref 0.27–4.2)

## 2022-01-31 PROCEDURE — 36415 COLL VENOUS BLD VENIPUNCTURE: CPT | Performed by: INTERNAL MEDICINE

## 2022-01-31 PROCEDURE — 3023F SPIROM DOC REV: CPT | Performed by: INTERNAL MEDICINE

## 2022-01-31 PROCEDURE — 3017F COLORECTAL CA SCREEN DOC REV: CPT | Performed by: INTERNAL MEDICINE

## 2022-01-31 PROCEDURE — G8419 CALC BMI OUT NRM PARAM NOF/U: HCPCS | Performed by: INTERNAL MEDICINE

## 2022-01-31 PROCEDURE — 99214 OFFICE O/P EST MOD 30 MIN: CPT | Performed by: INTERNAL MEDICINE

## 2022-01-31 PROCEDURE — G8482 FLU IMMUNIZE ORDER/ADMIN: HCPCS | Performed by: INTERNAL MEDICINE

## 2022-01-31 PROCEDURE — G8427 DOCREV CUR MEDS BY ELIG CLIN: HCPCS | Performed by: INTERNAL MEDICINE

## 2022-01-31 PROCEDURE — 4004F PT TOBACCO SCREEN RCVD TLK: CPT | Performed by: INTERNAL MEDICINE

## 2022-01-31 SDOH — ECONOMIC STABILITY: FOOD INSECURITY: WITHIN THE PAST 12 MONTHS, THE FOOD YOU BOUGHT JUST DIDN'T LAST AND YOU DIDN'T HAVE MONEY TO GET MORE.: NEVER TRUE

## 2022-01-31 SDOH — ECONOMIC STABILITY: FOOD INSECURITY: WITHIN THE PAST 12 MONTHS, YOU WORRIED THAT YOUR FOOD WOULD RUN OUT BEFORE YOU GOT MONEY TO BUY MORE.: NEVER TRUE

## 2022-01-31 ASSESSMENT — PATIENT HEALTH QUESTIONNAIRE - PHQ9
SUM OF ALL RESPONSES TO PHQ QUESTIONS 1-9: 0
SUM OF ALL RESPONSES TO PHQ9 QUESTIONS 1 & 2: 0
SUM OF ALL RESPONSES TO PHQ QUESTIONS 1-9: 0
4. FEELING TIRED OR HAVING LITTLE ENERGY: 0
2. FEELING DOWN, DEPRESSED OR HOPELESS: 0
3. TROUBLE FALLING OR STAYING ASLEEP: 0
7. TROUBLE CONCENTRATING ON THINGS, SUCH AS READING THE NEWSPAPER OR WATCHING TELEVISION: 0
10. IF YOU CHECKED OFF ANY PROBLEMS, HOW DIFFICULT HAVE THESE PROBLEMS MADE IT FOR YOU TO DO YOUR WORK, TAKE CARE OF THINGS AT HOME, OR GET ALONG WITH OTHER PEOPLE: 0
6. FEELING BAD ABOUT YOURSELF - OR THAT YOU ARE A FAILURE OR HAVE LET YOURSELF OR YOUR FAMILY DOWN: 0
1. LITTLE INTEREST OR PLEASURE IN DOING THINGS: 0
SUM OF ALL RESPONSES TO PHQ QUESTIONS 1-9: 0
8. MOVING OR SPEAKING SO SLOWLY THAT OTHER PEOPLE COULD HAVE NOTICED. OR THE OPPOSITE, BEING SO FIGETY OR RESTLESS THAT YOU HAVE BEEN MOVING AROUND A LOT MORE THAN USUAL: 0
2. FEELING DOWN, DEPRESSED OR HOPELESS: 0
5. POOR APPETITE OR OVEREATING: 0
SUM OF ALL RESPONSES TO PHQ QUESTIONS 1-9: 0
SUM OF ALL RESPONSES TO PHQ9 QUESTIONS 1 & 2: 0
SUM OF ALL RESPONSES TO PHQ QUESTIONS 1-9: 0
1. LITTLE INTEREST OR PLEASURE IN DOING THINGS: 0
SUM OF ALL RESPONSES TO PHQ QUESTIONS 1-9: 0
SUM OF ALL RESPONSES TO PHQ QUESTIONS 1-9: 0
9. THOUGHTS THAT YOU WOULD BE BETTER OFF DEAD, OR OF HURTING YOURSELF: 0
SUM OF ALL RESPONSES TO PHQ QUESTIONS 1-9: 0
1. LITTLE INTEREST OR PLEASURE IN DOING THINGS: 0

## 2022-01-31 ASSESSMENT — SOCIAL DETERMINANTS OF HEALTH (SDOH): HOW HARD IS IT FOR YOU TO PAY FOR THE VERY BASICS LIKE FOOD, HOUSING, MEDICAL CARE, AND HEATING?: NOT HARD AT ALL

## 2022-01-31 ASSESSMENT — LIFESTYLE VARIABLES: HOW OFTEN DO YOU HAVE A DRINK CONTAINING ALCOHOL: 0

## 2022-01-31 NOTE — PATIENT INSTRUCTIONS
Recommend getting a COVID booster. Other vaccines include your tetanus vaccine and shingles. You cannot have your shingles vaccine within 2 weeks of your Covid booster. Continue your wonderful job at cutting back on tobacco.    Be sure and call for your colonoscopy. Do not forget you have gallstones and symptoms that might be associated with this. Schedule with rheumatology in September    Continue on with your Ensure boosters drinks. If you can increase to 2 a day that would be great. Patient Education         Osteoporosis: Care Instructions  Your Care Instructions     Osteoporosis means the bones are weak and thin enough that they can break easily. The older you are, the more likely you are to get osteoporosis. But with plenty of calcium, vitamin D, and exercise, you can help prevent osteoporosis. The preteen and teen years are a key time for bone building. With the help of calcium, vitamin D, and exercise in those early years and beyond, the bones reach their peak density and strength by age 27. After age 27, your bones naturally start to thin and weaken. The stronger your bones are at around age 27, the lower your risk for osteoporosis. But no matter what your age and risk are, your bones still need calcium, vitamin D, and exercise to stay strong. Also avoid smoking, and limit alcohol. Smoking and heavy alcohol use can make your bones thinner. Talk to your doctor about any special risks you might have, such as having a close relative with osteoporosis or taking a medicine that can weaken bones. Your doctor can tell you the best ways to protect your bones from thinning. Follow-up care is a key part of your treatment and safety. Be sure to make and go to all appointments, and call your doctor if you are having problems. It's also a good idea to know your test results and keep a list of the medicines you take. How can you care for yourself at home?   Here are some things you can do to build and

## 2022-01-31 NOTE — PROGRESS NOTES
HPI: Abdirizak Issa presents for aloe up. Health issues include grief, emphysema, insomnia, adenomatous polyp with recheck due in 2020, hyperlipidemia, goiter, tobacco addiction,  osteoporosis, asymptomatic gallstones.     Underweight. History of depression. Positive tobacco.  Colon polyps. No current GE reflux. Taking Ensure in the morning. Weight has been stable. No vomiting. No diarrhea. Atypical chest pain. Abnormal thallium with heart catheterization 8/2021 nonobstructive coronary disease. [place] Positive aspirin, metoprolol, atorvastatin 80, Zetia 10.     Severe emphysema FEV1 of 1.4 in 2017.  Has cut back to tobacco about 1 pack a week from 4 packs a day.  Positive Breo and Spiriva. ER nebulizer. Rinses her mouth. Bring it being well today. . Christopher Chairez had her Covid vaccine #2.  Remote unprovoked PE.  CTA stable 2021 with multi lobar emphysema.  Follows with pulmonary     Review of CT scan shows gallstones. Asymptomatic.     Dysphagia. .  History Heimlich maneuver with pill and intermittent choking. .  Multinodular goiter.  Positive tobacco.  Modified barium swallow without aspiration.  Recommended sitting upright with meals and taking meds with purée or liquid.  Has had no more issues. Euthyroid.     Adenomatous polyp 2017 and negative upper endoscopy for sprue. Consider bacterial overgrowth with H2 breath test.  Follows with Dr. Dmitry Son. Overdue repeat colonoscopy and aware going to call to reschedule. No diarrhea or change in stools     Osteoporosis.  Positive vitamin D supplementation.  Reclast injection June 2020 without adverse effects.  DEXA scan February 2020 with T score of -3.8 and lumbar -3 in the right and left femoral neck.  Normal PTH, thyroid and vitamin D.neg sprue.  Positive caffeine, tobacco, low BMI. Difficult tobacco history. Positive sodas 5 daily. .  Following with rheumatology.  Positive osteoarthritis.  No longer using nonsteroidal anti-inflammatories. Recheck bone density due this year and injection in September     Chronic headaches significantly improved.  MRI with microvascular changes only.  No longer using daily Motrin. .          41 years taken off ventilator 2017.  + grief persist.  Doing her new puppy    Osteoporosis. Vitamin D supplement Reclast.  Tobacco down from 5 packs a day to 1. Positive caffeine. Underweight. Osteoarthritis chronic pain. Tylenol      Hyperlipidemia  down with atorvastatin and zetia  No liver function abnormality.  No claudication or TIA symptoms.    No cardiac testing CTA with coronary calcifications. No obstructive coronary disease on catheterization . LDL     Multinodular goiter.  Euthyroid.  No palpitations or stridor. Dysphagia doing better.     OA hips and back      .        vaginal delivery without complication. No gestational diabetes or hypertension. Tubal ligation. Early menopause. Denies change in visual acuity cotesting 2017 was normal.  Sister with breast. cancer.  Mammogram BI-RADS 2021. Does exams. No current concern    PMH:    Childbirth     PE  20's     pneumonia     Tubal      Coronary catheterization nonobstructive disease      SH: moved into home with daughter and    + tobacco 4 packs daily down to 1 weekly. Therese Karen alcohol.  No recreational drugs.  Retired minimal exercise. .   2017 after prolonged illness. No partners.  Jesurene Valentinbrannonawa   3 brother 3 sisters    + breast cancer in sister 2 mehul GSW     -colon, ovarian cancer     ROS: Decreased vision.    No concerns with memory.  No falls.  Edentulous.  Chronic shortness of breath and wheeze.  Is with pulmonary postnasal drip.  Depression and anxiety with grief.    Insomnia proved. Luz Marina Maid Positive palpitations near syncope and chest discomfort.  No breast masses.  Occasional dyspepsia.  Colonic polyps recheck due . Tamara Muñoz urinary concerns.  Occasional arthralgias.  Low BMI stable.  Wears sunscreen now.  Safe at home     Constitutional, ent, CV, respiratory, GI, , joint, skin, allergic and psychiatric ROS reviewed and negative except for above    Allergies   Allergen Reactions    Pcn [Penicillins]        Outpatient Medications Marked as Taking for the 1/31/22 encounter (Office Visit) with Reinier Saavedra MD   Medication Sig Dispense Refill    tiotropium (SPIRIVA RESPIMAT) 2.5 MCG/ACT AERS inhaler INHALE 2 PUFFS INTO THE LUNGS EVERY DAY 3 each 3    Fluticasone furoate-vilanterol (BREO ELLIPTA) 200-25 MCG/INH AEPB inhaler Inhale 1 puff into the lungs daily One puff daily 3 each 3    albuterol sulfate HFA (VENTOLIN HFA) 108 (90 Base) MCG/ACT inhaler Inhale 2 puffs into the lungs 4 times daily 3 each 3    metoprolol succinate (TOPROL XL) 25 MG extended release tablet TAKE ONE TABLET BY MOUTH DAILY 90 tablet 3    aspirin (ASPIRIN CHILDRENS) 81 MG chewable tablet Take 1 tablet by mouth daily 30 tablet 3    atorvastatin (LIPITOR) 80 MG tablet TAKE ONE TABLET BY MOUTH DAILY 90 tablet 3    albuterol (PROVENTIL) (2.5 MG/3ML) 0.083% nebulizer solution Take 3 mLs by nebulization every 4 hours as needed for Wheezing 120 mL 5    ezetimibe (ZETIA) 10 MG tablet Take 1 tablet by mouth daily In addition to atorvastatin for cholesterol 90 tablet 0    vitamin D (ERGOCALCIFEROL) 1.25 MG (12169 UT) CAPS capsule 1 po q 2 weeks 6 capsule 3    calcium gluconate 500 MG tablet Take 1 tablet by mouth daily 30 tablet 5             Past Medical History:   Diagnosis Date    Abnormal screening mammogram 7/31/2017    Dx right pending    Anxiety     Arthritis     Asthma     Cholelithiases 3/17/2021    Colon polyps 10/30/2017    Multiple path pending 10.2017  Recheck 10.2020    Depression     Headache 8/21/2019    History of abnormal mammogram 7/31/2017    birads 3 right recheck 8.2018 9.2019 recheck 1 year 10.2020 birad 1    Hx of adenomatous colonic polyps 10/30/2017    adenoma 10.2017  Recheck 10.2020    Multinodular thyroid 7/25/2017 7.2017 euthyroid recheck 1 year    Oral phase dysphagia 4/28/2021    Other osteoporosis without current pathological fracture 2/21/2020 2.2020  T-score of  -3.8 and a Z-score of -2.3.  spine; hip -3.0 and a Z-score of -1.7       Past Surgical History:   Procedure Laterality Date    TONSILLECTOMY      TUBAL LIGATION               Family History   Problem Relation Age of Onset    COPD Father     Breast Cancer Sister     Heart Attack Brother              Objective     /67   Pulse 100   Resp 16   Ht 5' 4\" (1.626 m)   Wt 107 lb (48.5 kg)   SpO2 96% Comment: RA  BMI 18.37 kg/m²     @LASTSAO2(3)@    Wt Readings from Last 3 Encounters:   12/16/21 105 lb (47.6 kg)   12/14/21 105 lb 3.2 oz (47.7 kg)   11/05/21 108 lb (49 kg)       Physical Exam     NAD alert and cooperative  HEENT: Throat is clear. No oral ulcers. TMs unremarkable. Dentures above. Good upstroke of the carotids. Nodule well-defined and mobile left anterior neck. No adenopathy. Nontender fluctuant. Lungs decreased breath sounds. No wheezing rales or rhonchi. Cardiovascular exam regular rate and rhythm no murmur click. Abdomen scaphoid no hepatosplenomegaly epigastric tenderness or masses. Positive pulses lower extremities no peripheral edema. No suspicious skin lesions or nodules  She is appropriate well-groomed. Good eye contact.     Chemistry        Component Value Date/Time     08/16/2021 0956    K 3.8 08/16/2021 0956     08/16/2021 0956    CO2 24 08/16/2021 0956    BUN 9 08/16/2021 0956    CREATININE 0.5 (L) 08/16/2021 0956        Component Value Date/Time    CALCIUM 8.7 08/16/2021 0956    ALKPHOS 107 08/16/2021 0956    AST 27 08/16/2021 0956    ALT 18 08/16/2021 0956    BILITOT 0.3 08/16/2021 0956            Lab Results   Component Value Date    WBC 5.2 07/22/2021    HGB 13.7 07/22/2021    HCT 39.5 07/22/2021    MCV 91.6 07/22/2021     07/22/2021     Lab Results Component Value Date    LABA1C 5.4 07/20/2017     Lab Results   Component Value Date    .3 07/20/2017     Lab Results   Component Value Date    LABA1C 5.4 07/20/2017     No components found for: CHLPL  Lab Results   Component Value Date    TRIG 99 06/24/2021    TRIG 148 10/13/2020    TRIG 130 11/13/2019     Lab Results   Component Value Date    HDL 43 06/24/2021    HDL 49 10/13/2020    HDL 61 (H) 11/13/2019     Lab Results   Component Value Date    LDLCALC 64 06/24/2021    LDLCALC 155 (H) 10/13/2020    LDLCALC 172 (H) 11/13/2019     Lab Results   Component Value Date    LABVLDL 20 06/24/2021    LABVLDL 30 10/13/2020    LABVLDL 26 11/13/2019       Old labs and records reviewed or requested  Discussed past lab and studies with patient      Diagnosis Orders   1. Osteoporosis, unspecified osteoporosis type, unspecified pathological fracture presence  Comprehensive Metabolic Panel   2. Malnutrition, unspecified type ([address]  Comprehensive Metabolic Panel   3. COPD, severe ([address]     4. MDD (recurrent major depressive disorder) in remission ([address]     5. NSVT (nonsustained ventricular tachycardia) (H[facility])     6. Vitamin D deficiency     7. Tobacco dependence     8. Panlobular emphysema ([address]     9. Multinodular thyroid  TSH WITH REFLEX TO FT4   10. Hx of adenomatous colonic polyps     11. Familial hypercholesterolemia  Comprehensive Metabolic Panel   12. Calculus of gallbladder without cholecystitis without obstruction  Comprehensive Metabolic Panel     Process. Vitamin D supplementation repeat bone density and infusion September 2022. Malnutrition. Continue on with her current supplements. Basic metabolic profile. Major depression in remission. History of nonsustained ventricular tachycardia no symptoms on her beta-blocker. Vitamin D deficiency supplementation. Tobacco dependence successful weaning. Multinodular goiter. Thyroid function. No recurrent dysphagia. Colonic polyps.   Follow-up colonoscopy this year. No change in schools. Asymptomatic gallstones    On this date I have spent 30 minutes reviewing previous notes, test results, and face to face with the patient discussing the diagnosis and plan          Return in about 6 months (around 7/31/2022). Diagnosis and treatment discussed.   Possible side effects of medication reviewed  Patients questions answered  Follow up understood  Pt aware if they are not contacted about any test results , this does not mean they are normal.  They should call

## 2022-02-01 NOTE — PROGRESS NOTES
Medicare Annual Wellness Visit  Are Name: Ant Hood Date: 2022   MRN: [id number] Sex: Female   Age: 57 y.o. Ethnicity: Non- / Non    : 1960 Race: White (non-)      Shayy Collado is here for No chief complaint on file. Screenings for behavioral, psychosocial and functional/safety risks, and cognitive dysfunction are all negative except as indicated below. These results, as well as other patient data from the [address] form, are documented in Flowsheets linked to this Encounter. Allergies   Allergen Reactions    Pcn [Penicillins]        Prior to Visit Medications    Medication Sig Taking?  Authorizing Provider   tiotropium (SPIRIVA RESPIMAT) 2.5 MCG/ACT AERS inhaler INHALE 2 PUFFS INTO THE LUNGS EVERY DAY  Pamela Code, DO   Fluticasone furoate-vilanterol (BREO ELLIPTA) 200-25 MCG/INH AEPB inhaler Inhale 1 puff into the lungs daily One puff daily  Pamela Code, DO   albuterol sulfate HFA (VENTOLIN HFA) 108 (90 Base) MCG/ACT inhaler Inhale 2 puffs into the lungs 4 times daily  Pamela Code, DO   metoprolol succinate (TOPROL XL) 25 MG extended release tablet TAKE ONE TABLET BY MOUTH DAILY  Trice Zhou MD   zoledronic acid (RECLAST) 5 MG/100ML SOLN Infuse 100 mLs intravenously once for 1 dose  Didier Jacob MD   aspirin (ASPIRIN CHILDRENS) 81 MG chewable tablet Take 1 tablet by mouth daily  Trice Zhou MD   atorvastatin (LIPITOR) 80 MG tablet TAKE ONE TABLET BY MOUTH DAILY  Lonne Olszewski, MD   albuterol (PROVENTIL) (2.5 MG/3ML) 0.083% nebulizer solution Take 3 mLs by nebulization every 4 hours as needed for Wheezing  Pamela Code, DO   ezetimibe (ZETIA) 10 MG tablet Take 1 tablet by mouth daily In addition to atorvastatin for cholesterol  Lonne Olszewski, MD   vitamin D (ERGOCALCIFEROL) 1.25 MG (95149 UT) CAPS capsule 1 po q 2 weeks  Lonne Olszewski, MD   calcium gluconate 500 MG tablet Take 1 tablet by mouth daily  Kashmir Phillips MD       Past Medical History:   Diagnosis Date    Abnormal screening mammogram 7/31/2017    Dx right pending    Anxiety     Arthritis     Asthma     Cholelithiases 3/17/2021    Colon polyps 10/30/2017    Multiple path pending 10.2017  Recheck 10.2020    Depression     Headache 8/21/2019    History of abnormal mammogram 7/31/2017    birads 3 right recheck 8.2018 9.2019 recheck 1 year 10.2020 birad 1    Hx of adenomatous colonic polyps 10/30/2017    adenoma 10.2017  Recheck 10.2020    MDD (recurrent major depressive disorder) in remission ([facility] 3/11/2020    Multinodular thyroid 7/25/2017 7.2017 euthyroid recheck 1 year    Oral phase dysphagia 4/28/2021    Other osteoporosis without current pathological fracture 2/21/2020 2.2020  T-score of  -3.8 and a Z-score of -2.3.  spine; hip -3.0 and a Z-score of -1.7       Past Surgical History:   Procedure Laterality Date    TONSILLECTOMY      TUBAL LIGATION         Family History   Problem Relation Age of Onset    COPD Father     Breast Cancer Sister     Heart Attack Brother        CareTeam (Including outside providers/suppliers regularly involved in providing care):   Patient Care Team:  Dennie Hensen, MD as PCP - General (Internal Medicine)  Dennie Hensen, MD as PCP - REHABILITATION [facility]  Sheridan Boland RN as Nurse Navigator    Wt Readings from Last 3 Encounters:   01/31/22 107 lb (48.5 kg)   12/16/21 105 lb (47.6 kg)   12/14/21 105 lb 3.2 oz (47.7 kg)      No flowsheet data found. There is no height or weight on file to calculate BMI. Based upon direct observation of the patient, evaluation of cognition reveals recent and remote memory intact. Patient's complete Health Risk Assessment and screening values have been reviewed and are found in Flowsheets.  The following problems were reviewed today and where indicated follow up appointments were made and/or referrals ordered. Positive Risk Factor Screenings with Interventions:         Substance History:  Social History     Tobacco History     Smoking Status  Current Every Day Smoker Smoking Frequency  1 pack/day for 50 years (50 pk yrs) Smoking Tobacco Type  Cigarettes    Smokeless Tobacco Use  Never Used    Tobacco Comment  cutting back now at 1/2 pack per day          Alcohol History     Alcohol Use Status  No          Drug Use     Drug Use Status  No          Sexual Activity     Sexually Active  Yes Partners  Male               Alcohol Screening:       A score of 8 or more is associated with harmful or hazardous drinking. A score of 13 or more in women, and 15 or more in men, is likely to indicate alcohol dependence. Substance Abuse Interventions:  She does not drink. Is down to 1 pack a day of tobacco from [address]  and ACP:  General  In general, how would you say your health is?: Good  In the past 7 days, have you experienced any of the following?  New or Increased Pain, New or Increased Fatigue, Loneliness, Social Isolation, Stress or Anger?: None of These  Do you get the social and emotional support that you need?: Yes  Do you have a Living Will?: (!) No (has packet completed but needs notary)  Advance Directives     Power of [address] [place] [address] Will ACP-Advance Directive ACP-Power of     Not on File Not on File Not on File Not on File      General Health Risk Interventions:  · No Living Will: pt has and plans to notorize will bring in copy when notarized    Health Habits/Nutrition:  Health Habits/Nutrition  Do you exercise for at least 20 minutes 2-3 times per week?: Yes  Have you lost any weight without trying in the past 3 months?: No  Do you eat only one meal per day?: No  Have you seen the dentist within the past year?: (!) No     Health Habits/Nutrition Interventions:  · Dental exam overdue:  patient encouraged to make appointment with his/her dentist         Personalized Preventive Plan   Current of the following organ systems was limited: Vitals/Constitutional/EENT/Resp/CV/GI//MS/Neuro/Skin/Heme-Lymph-Imm. Pursuant to the emergency declaration under the [address], [address] [place] [address] and the [employer] Act, this Virtual Visit was conducted with patient's (and/or legal guardian's) consent, to reduce the patient's risk of exposure to COVID-19 and provide necessary medical care. The patient (and/or legal guardian) has also been advised to contact this office for worsening conditions or problems, and seek emergency medical treatment and/or call 911 if deemed necessary. Patient identification was verified at the start of the visit: Yes    Services were provided through phone to substitute for in-person clinic visit. Patient and provider were located at their individual homes. --Low Robles MD on 2/1/2022 at 5:11 PM    An electronic signature was used to authenticate this note. Addendum. Patient's daughter with Covid. Onset of symptoms 4 days ago. Patient has been vaccinated. COPD. No symptoms. Knows to isolate. Has inhalers. Discussed isolation oximetry readings and reasons for ER visit     prior note copied for reference    Health issues include grief, emphysema, insomnia, adenomatous polyp with recheck due in 2020, hyperlipidemia, goiter, tobacco addiction,  osteoporosis, asymptomatic gallstones.     Underweight. History of depression. Positive tobacco.  Colon polyps. No current GE reflux. Taking Ensure in the morning. No vomiting. No diarrhea.     Atypical chest pain. Abnormal thallium with heart catheterization 8/2021 nonobstructive coronary disease. Luz Marina Maid Positive aspirin, metoprolol, atorvastatin 80, Zetia 10.     Severe emphysema FEV1 of 1.4 in 2017.  Has cut back to tobacco about 1 pack a week from 4 packs a day.  Positive Breo and Spiriva. ER nebulizer. Rinses her mouth. Bring it being well today. . Juan Varma had her Covid vaccine #2.  Remote unprovoked PE.  CTA stable 2021 with multi lobar emphysema.  Follows with pulmonary      Review of CT scan shows gallstones. Asymptomatic.     Dysphagia. .  History Heimlich maneuver with pill and intermittent choking. .  Multinodular goiter.  Positive tobacco.  Modified barium swallow without aspiration.  Recommended sitting upright with meals and taking meds with purée or liquid.  Has had no more issues. Euthyroid.     Adenomatous polyp 2017 and negative upper endoscopy for sprue.  Consider bacterial overgrowth with H2 breath test.  Follows with Dr. Sabina Moulton.  Overdue repeat colonoscopy and aware going to call to reschedule. No diarrhea or change in stools     Osteoporosis.  Positive vitamin D supplementation.  Reclast injection June 2020 without adverse effects.  DEXA scan February 2020 with T score of -3.8 and lumbar -3 in the right and left femoral neck.  Normal PTH, thyroid and vitamin D.neg sprue.  Positive caffeine, tobacco, low BMI. Difficult tobacco history. Positive sodas 5 daily. .  Following with rheumatology.  Positive osteoarthritis.  No longer using nonsteroidal anti-inflammatories.   Recheck bone density due this year and injection in September     Chronic headaches significantly improved.  MRI with microvascular changes only.  No longer using daily Motrin. .          41 years taken off ventilator December 2017.  + grief persist.  Doing her new puppy     Osteoporosis. Vitamin D supplement Reclast.  Tobacco down from 5 packs a day to 1. Positive caffeine. Underweight. Osteoarthritis chronic pain. Tylenol      Hyperlipidemia  down with atorvastatin and zetia  No liver function abnormality.  No claudication or TIA symptoms.    No cardiac testing CTA with coronary calcifications.    No obstructive coronary disease on catheterization 2021.   LDL 64 June 2021     Multinodular goiter.  Euthyroid.  No palpitations or stridor. Dysphagia doing better.     OA hips and back      .        vaginal delivery without complication. No gestational diabetes or hypertension. Tubal ligation. Early menopause. Denies change in visual acuity cotesting 2017 was normal.  Sister with breast. cancer.  Mammogram BI-RADS 2021. Does exams. No current concern     PMH:    Childbirth     PE  20's     pneumonia     Tubal      Coronary catheterization nonobstructive disease      SH: moved into home with daughter and    + tobacco 4 packs daily down to 1 weekly. Rosslyn Hatchet alcohol.  No recreational drugs.  Retired minimal exercise. .   2017 after prolonged illness. No partners.  Molly Res  FH 3 brother 3 sisters    + breast cancer in sister 2 ulncle GSW     -colon, ovarian cancer     ROS: Decreased vision.    No concerns with memory.  No falls.  Edentulous.  Chronic shortness of breath and wheeze.  Is with pulmonary postnasal drip.  Depression and anxiety with grief.    Insomnia proved. . Positive palpitations near syncope and chest discomfort.  No breast masses.  Occasional dyspepsia.  Colonic polyps recheck due . Aylin Valentine urinary concerns.  Occasional arthralgias.  Low BMI stable.  Wears sunscreen now.  Safe at home

## 2022-02-02 ENCOUNTER — VIRTUAL VISIT (OUTPATIENT)
Dept: FAMILY MEDICINE CLINIC | Age: 62
End: 2022-02-02
Payer: MEDICARE

## 2022-02-02 DIAGNOSIS — Z00.00 ENCOUNTER FOR INITIAL ANNUAL WELLNESS VISIT (AWV) IN MEDICARE PATIENT: Primary | ICD-10-CM

## 2022-02-02 PROCEDURE — G8482 FLU IMMUNIZE ORDER/ADMIN: HCPCS | Performed by: INTERNAL MEDICINE

## 2022-02-02 PROCEDURE — G0438 PPPS, INITIAL VISIT: HCPCS | Performed by: INTERNAL MEDICINE

## 2022-02-02 PROCEDURE — 3017F COLORECTAL CA SCREEN DOC REV: CPT | Performed by: INTERNAL MEDICINE

## 2022-02-02 NOTE — PATIENT INSTRUCTIONS
Stopping Smoking: Care Instructions  Your Care Instructions     Cigarette smokers crave the nicotine in cigarettes. Giving it up is much harder than simply changing a habit. Your body has to stop craving the nicotine. It is hard to quit, but you can do it. There are many tools that people use to quit smoking. You may find that combining tools works best for you. There are several steps to quitting. First you get ready to quit. Then you get support to help you. After that, you learn new skills and behaviors to become a nonsmoker. For many people, a necessary step is getting and using medicine. Your doctor will help you set up the plan that best meets your needs. You may want to attend a smoking cessation program to help you quit smoking. When you choose a program, look for one that has proven success. Ask your doctor for ideas. You will greatly increase your chances of success if you take medicine as well as get counseling or join a cessation program.  Some of the changes you feel when you first quit tobacco are uncomfortable. Your body will miss the nicotine at first, and you may feel short-tempered and grumpy. You may have trouble sleeping or concentrating. Medicine can help you deal with these symptoms. You may struggle with changing your smoking habits and rituals. The last step is the tricky one: Be prepared for the smoking urge to continue for a time. This is a lot to deal with, but keep at it. You will feel better. Follow-up care is a key part of your treatment and safety. Be sure to make and go to all appointments, and call your doctor if you are having problems. It's also a good idea to know your test results and keep a list of the medicines you take. How can you care for yourself at home? · Ask your family, friends, and coworkers for support. You have a better chance of quitting if you have help and support.   · Join a support group, such as Nicotine Anonymous, for people who are trying to quit smoking. · Consider signing up for a smoking cessation program, such as the American Lung Association's Freedom from Smoking program.  · Get text messaging support. Go to the website at www.smokefree. gov to sign up for the [facility] program.  · Set a quit date. Pick your date carefully so that it is not right in the middle of a big deadline or stressful time. Once you quit, do not even take a puff. Get rid of all ashtrays and lighters after your last cigarette. Clean your house and your clothes so that they do not smell of smoke. · Learn how to be a nonsmoker. Think about ways you can avoid those things that make you reach for a cigarette. ? Avoid situations that put you at greatest risk for smoking. For some people, it is hard to have a drink with friends without smoking. For others, they might skip a coffee break with coworkers who smoke. ? Change your daily routine. Take a different route to work or eat a meal in a different place. · Cut down on stress. Calm yourself or release tension by doing an activity you enjoy, such as reading a book, taking a hot bath, or gardening. · Talk to your doctor or pharmacist about nicotine replacement therapy, which replaces the nicotine in your body. You still get nicotine but you do not use tobacco. Nicotine replacement products help you slowly reduce the amount of nicotine you need. These products come in several forms, many of them available over-the-counter:  ? Nicotine patches  ? Nicotine gum and lozenges  ? Nicotine inhaler  · Ask your doctor about bupropion (Wellbutrin) or varenicline (Chantix), which are prescription medicines. They do not contain nicotine. They help you by reducing withdrawal symptoms, such as stress and anxiety. · Some people find hypnosis, acupuncture, and massage helpful for ending the smoking habit. · Eat a healthy diet and get regular exercise. Having healthy habits will help your body move past its craving for nicotine.   · Be prepared to keep trying. Most people are not successful the first few times they try to quit. Do not get mad at yourself if you smoke again. Make a list of things you learned and think about when you want to try again, such as next week, next month, or next year. Where can you learn more? Go to https://chpepiceweb.Pressi. org and sign in to your [employer] account. Enter H025 in the [employer]) box to learn more about \"Stopping Smoking: Care Instructions. \"     If you do not have an account, please click on the \"Sign Up Now\" link. Current as of: February 11, 2021               Content Version: 13.1  © 2820-0552 Healthwise, [employer]. Care instructions adapted under license by [facility] ([facility]). If you have questions about a medical condition or this instruction, always ask your healthcare professional. [place] [address] any warranty or liability for your use of this information. Personalized Preventive Plan for Earnest Deal - 2/2/2022  Medicare offers a range of preventive health benefits. Some of the tests and screenings are paid in full while other may be subject to a deductible, co-insurance, and/or copay. Some of these benefits include a comprehensive review of your medical history including lifestyle, illnesses that may run in your family, and various assessments and screenings as appropriate. After reviewing your medical record and screening and assessments performed today your provider may have ordered immunizations, labs, imaging, and/or referrals for you. A list of these orders (if applicable) as well as your Preventive Care list are included within your After Visit Summary for your review. Other Preventive Recommendations:    · A preventive eye exam performed by an eye specialist is recommended every 1-2 years to screen for glaucoma; cataracts, macular degeneration, and other eye disorders. · A preventive dental visit is recommended every 6 months.   · Try to get at least 150 minutes of exercise per week or 10,000 steps per day on a pedometer . · Order or download the FREE \"Exercise & Physical Activity: Your Everyday Guide\" from [employer]. Call 1-836.347.7677 or search [employer] online. · You need 9632-4689 mg of calcium and 4451-9261 IU of vitamin D per day. It is possible to meet your calcium requirement with diet alone, but a vitamin D supplement is usually necessary to meet this goal.  · When exposed to the sun, use a sunscreen that protects against both UVA and UVB radiation with an SPF of 30 or greater. Reapply every 2 to 3 hours or after sweating, drying off with a towel, or swimming. · Always wear a seat belt when traveling in a car. Always wear a helmet when riding a bicycle or motorcycle.

## 2022-02-07 ENCOUNTER — HOSPITAL ENCOUNTER (INPATIENT)
Age: 62
LOS: 2 days | Discharge: HOME | DRG: 871 | End: 2022-02-09
Attending: EMERGENCY MEDICINE | Admitting: FAMILY MEDICINE
Payer: MEDICARE

## 2022-02-07 ENCOUNTER — APPOINTMENT (OUTPATIENT)
Dept: GENERAL RADIOLOGY | Age: 62
DRG: 871 | End: 2022-02-07
Payer: MEDICARE

## 2022-02-07 ENCOUNTER — APPOINTMENT (OUTPATIENT)
Dept: CT IMAGING | Age: 62
DRG: 871 | End: 2022-02-07
Payer: MEDICARE

## 2022-02-07 DIAGNOSIS — Z20.822 SUSPECTED COVID-19 VIRUS INFECTION: Primary | ICD-10-CM

## 2022-02-07 PROBLEM — A41.9 SEPSIS (HCC): Status: ACTIVE | Noted: 2022-02-07

## 2022-02-07 LAB
A/G RATIO: 1.3 (ref 1.1–2.2)
ALBUMIN SERPL-MCNC: 3.6 G/DL (ref 3.4–5)
ALP BLD-CCNC: 94 U/L (ref 40–129)
ALT SERPL-CCNC: 19 U/L (ref 10–40)
AMYLASE: 44 U/L (ref 25–115)
ANION GAP SERPL CALCULATED.3IONS-SCNC: 16 MMOL/L (ref 3–16)
APTT: 39.8 SEC (ref 26.2–38.6)
AST SERPL-CCNC: 31 U/L (ref 15–37)
BASE EXCESS VENOUS: -1.1 MMOL/L (ref -3–3)
BASOPHILS ABSOLUTE: 0 K/UL (ref 0–0.2)
BASOPHILS RELATIVE PERCENT: 0.6 %
BILIRUB SERPL-MCNC: <0.2 MG/DL (ref 0–1)
BILIRUBIN URINE: NEGATIVE
BLOOD, URINE: ABNORMAL
BUN BLDV-MCNC: 12 MG/DL (ref 7–20)
C-REACTIVE PROTEIN: 6.3 MG/L (ref 0–5.1)
CALCIUM SERPL-MCNC: 8.4 MG/DL (ref 8.3–10.6)
CARBOXYHEMOGLOBIN: 3.5 % (ref 0–1.5)
CHLORIDE BLD-SCNC: 96 MMOL/L (ref 99–110)
CLARITY: CLEAR
CO2: 21 MMOL/L (ref 21–32)
COLOR: YELLOW
COMMENT UA: NORMAL
CREAT SERPL-MCNC: 1.2 MG/DL (ref 0.6–1.2)
D DIMER: 1145 NG/ML DDU (ref 0–229)
EKG ATRIAL RATE: 82 BPM
EKG DIAGNOSIS: NORMAL
EKG P AXIS: 80 DEGREES
EKG P-R INTERVAL: 170 MS
EKG Q-T INTERVAL: 404 MS
EKG QRS DURATION: 84 MS
EKG QTC CALCULATION (BAZETT): 472 MS
EKG R AXIS: 26 DEGREES
EKG T AXIS: 86 DEGREES
EKG VENTRICULAR RATE: 82 BPM
EOSINOPHILS ABSOLUTE: 0 K/UL (ref 0–0.6)
EOSINOPHILS RELATIVE PERCENT: 0 %
EPITHELIAL CELLS, UA: 0 /HPF (ref 0–5)
FERRITIN: 94 NG/ML (ref 15–150)
GFR AFRICAN AMERICAN: 55
GFR NON-AFRICAN AMERICAN: 45
GLUCOSE BLD-MCNC: 193 MG/DL (ref 70–99)
GLUCOSE URINE: NEGATIVE MG/DL
HCO3 VENOUS: 23.7 MMOL/L (ref 23–29)
HCT VFR BLD CALC: 35.9 % (ref 36–48)
HEMOGLOBIN, VEN, REDUCED: 6 %
HEMOGLOBIN: 12.3 G/DL (ref 12–16)
HYALINE CASTS: 0 /LPF (ref 0–8)
INR BLD: 1.04 (ref 0.88–1.12)
KETONES, URINE: NEGATIVE MG/DL
LACTIC ACID, SEPSIS: 4.4 MMOL/L (ref 0.4–1.9)
LACTIC ACID, SEPSIS: 4.8 MMOL/L (ref 0.4–1.9)
LACTIC ACID: 4.8 MMOL/L (ref 0.4–2)
LEUKOCYTE ESTERASE, URINE: NEGATIVE
LIPASE: 27 U/L (ref 13–60)
LYMPHOCYTES ABSOLUTE: 0.1 K/UL (ref 1–5.1)
LYMPHOCYTES RELATIVE PERCENT: 3.2 %
MAGNESIUM: 1.7 MG/DL (ref 1.8–2.4)
MCH RBC QN AUTO: 31.3 PG (ref 26–34)
MCHC RBC AUTO-ENTMCNC: 34.3 G/DL (ref 31–36)
MCV RBC AUTO: 91.3 FL (ref 80–100)
METHEMOGLOBIN VENOUS: 0.4 %
MICROSCOPIC EXAMINATION: YES
MONOCYTES ABSOLUTE: 0.1 K/UL (ref 0–1.3)
MONOCYTES RELATIVE PERCENT: 3.3 %
NEUTROPHILS ABSOLUTE: 3.4 K/UL (ref 1.7–7.7)
NEUTROPHILS RELATIVE PERCENT: 92.9 %
NITRITE, URINE: NEGATIVE
O2 CONTENT, VEN: 16 VOL %
O2 SAT, VEN: 94 %
O2 THERAPY: ABNORMAL
PCO2, VEN: 39.2 MMHG (ref 40–50)
PDW BLD-RTO: 13 % (ref 12.4–15.4)
PH UA: 5.5 (ref 5–8)
PH VENOUS: 7.39 (ref 7.35–7.45)
PLATELET # BLD: 176 K/UL (ref 135–450)
PMV BLD AUTO: 8.2 FL (ref 5–10.5)
PO2, VEN: 66.4 MMHG (ref 25–40)
POTASSIUM REFLEX MAGNESIUM: 3.1 MMOL/L (ref 3.5–5.1)
PRO-BNP: 628 PG/ML (ref 0–124)
PROCALCITONIN: 0.13 NG/ML (ref 0–0.15)
PROTEIN UA: NEGATIVE MG/DL
PROTHROMBIN TIME: 11.8 SEC (ref 9.9–12.7)
RAPID INFLUENZA  B AGN: NEGATIVE
RAPID INFLUENZA A AGN: NEGATIVE
RBC # BLD: 3.94 M/UL (ref 4–5.2)
RBC UA: 1 /HPF (ref 0–4)
SEDIMENTATION RATE, ERYTHROCYTE: 6 MM/HR (ref 0–30)
SODIUM BLD-SCNC: 133 MMOL/L (ref 136–145)
SPECIFIC GRAVITY UA: 1.02 (ref 1–1.03)
TCO2 CALC VENOUS: 56 MMOL/L
TOTAL PROTEIN: 6.3 G/DL (ref 6.4–8.2)
TROPONIN: <0.01 NG/ML
URINE REFLEX TO CULTURE: ABNORMAL
URINE TYPE: ABNORMAL
UROBILINOGEN, URINE: 0.2 E.U./DL
WBC # BLD: 3.7 K/UL (ref 4–11)
WBC UA: 2 /HPF (ref 0–5)

## 2022-02-07 PROCEDURE — 82150 ASSAY OF AMYLASE: CPT

## 2022-02-07 PROCEDURE — 82728 ASSAY OF FERRITIN: CPT

## 2022-02-07 PROCEDURE — 85610 PROTHROMBIN TIME: CPT

## 2022-02-07 PROCEDURE — 81001 URINALYSIS AUTO W/SCOPE: CPT

## 2022-02-07 PROCEDURE — 71045 X-RAY EXAM CHEST 1 VIEW: CPT

## 2022-02-07 PROCEDURE — 83605 ASSAY OF LACTIC ACID: CPT

## 2022-02-07 PROCEDURE — 86140 C-REACTIVE PROTEIN: CPT

## 2022-02-07 PROCEDURE — 85379 FIBRIN DEGRADATION QUANT: CPT

## 2022-02-07 PROCEDURE — 87804 INFLUENZA ASSAY W/OPTIC: CPT

## 2022-02-07 PROCEDURE — 82803 BLOOD GASES ANY COMBINATION: CPT

## 2022-02-07 PROCEDURE — 1200000000 HC SEMI PRIVATE

## 2022-02-07 PROCEDURE — 71260 CT THORAX DX C+: CPT

## 2022-02-07 PROCEDURE — U0003 INFECTIOUS AGENT DETECTION BY NUCLEIC ACID (DNA OR RNA); SEVERE ACUTE RESPIRATORY SYNDROME CORONAVIRUS 2 (SARS-COV-2) (CORONAVIRUS DISEASE [COVID-19]), AMPLIFIED PROBE TECHNIQUE, MAKING USE OF HIGH THROUGHPUT TECHNOLOGIES AS DESCRIBED BY CMS-2020-01-R: HCPCS

## 2022-02-07 PROCEDURE — 87086 URINE CULTURE/COLONY COUNT: CPT

## 2022-02-07 PROCEDURE — 83735 ASSAY OF MAGNESIUM: CPT

## 2022-02-07 PROCEDURE — 6360000002 HC RX W HCPCS: Performed by: INTERNAL MEDICINE

## 2022-02-07 PROCEDURE — 85025 COMPLETE CBC W/AUTO DIFF WBC: CPT

## 2022-02-07 PROCEDURE — 2580000003 HC RX 258: Performed by: INTERNAL MEDICINE

## 2022-02-07 PROCEDURE — 83880 ASSAY OF NATRIURETIC PEPTIDE: CPT

## 2022-02-07 PROCEDURE — 85652 RBC SED RATE AUTOMATED: CPT

## 2022-02-07 PROCEDURE — 99284 EMERGENCY DEPT VISIT MOD MDM: CPT

## 2022-02-07 PROCEDURE — 93010 ELECTROCARDIOGRAM REPORT: CPT | Performed by: INTERNAL MEDICINE

## 2022-02-07 PROCEDURE — 93005 ELECTROCARDIOGRAM TRACING: CPT | Performed by: EMERGENCY MEDICINE

## 2022-02-07 PROCEDURE — 84484 ASSAY OF TROPONIN QUANT: CPT

## 2022-02-07 PROCEDURE — 36415 COLL VENOUS BLD VENIPUNCTURE: CPT

## 2022-02-07 PROCEDURE — 87449 NOS EACH ORGANISM AG IA: CPT

## 2022-02-07 PROCEDURE — 2580000003 HC RX 258: Performed by: EMERGENCY MEDICINE

## 2022-02-07 PROCEDURE — U0005 INFEC AGEN DETEC AMPLI PROBE: HCPCS

## 2022-02-07 PROCEDURE — 96361 HYDRATE IV INFUSION ADD-ON: CPT

## 2022-02-07 PROCEDURE — 83690 ASSAY OF LIPASE: CPT

## 2022-02-07 PROCEDURE — 84145 PROCALCITONIN (PCT): CPT

## 2022-02-07 PROCEDURE — 6370000000 HC RX 637 (ALT 250 FOR IP): Performed by: INTERNAL MEDICINE

## 2022-02-07 PROCEDURE — 6360000004 HC RX CONTRAST MEDICATION: Performed by: EMERGENCY MEDICINE

## 2022-02-07 PROCEDURE — 87040 BLOOD CULTURE FOR BACTERIA: CPT

## 2022-02-07 PROCEDURE — 85730 THROMBOPLASTIN TIME PARTIAL: CPT

## 2022-02-07 PROCEDURE — 80053 COMPREHEN METABOLIC PANEL: CPT

## 2022-02-07 RX ORDER — SODIUM CHLORIDE 9 MG/ML
25 INJECTION, SOLUTION INTRAVENOUS PRN
Status: DISCONTINUED | OUTPATIENT
Start: 2022-02-07 | End: 2022-02-09 | Stop reason: HOSPADM

## 2022-02-07 RX ORDER — ACETAMINOPHEN 650 MG/1
650 SUPPOSITORY RECTAL EVERY 6 HOURS PRN
Status: DISCONTINUED | OUTPATIENT
Start: 2022-02-07 | End: 2022-02-09 | Stop reason: HOSPADM

## 2022-02-07 RX ORDER — SODIUM CHLORIDE, SODIUM LACTATE, POTASSIUM CHLORIDE, CALCIUM CHLORIDE 600; 310; 30; 20 MG/100ML; MG/100ML; MG/100ML; MG/100ML
30 INJECTION, SOLUTION INTRAVENOUS ONCE
Status: COMPLETED | OUTPATIENT
Start: 2022-02-07 | End: 2022-02-07

## 2022-02-07 RX ORDER — DOXYCYCLINE HYCLATE 100 MG/1
CAPSULE ORAL
Status: ON HOLD | COMMUNITY
Start: 2022-02-07 | End: 2022-02-07

## 2022-02-07 RX ORDER — ASPIRIN 81 MG/1
81 TABLET, CHEWABLE ORAL DAILY
Status: DISCONTINUED | OUTPATIENT
Start: 2022-02-08 | End: 2022-02-09 | Stop reason: HOSPADM

## 2022-02-07 RX ORDER — BUDESONIDE AND FORMOTEROL FUMARATE DIHYDRATE 160; 4.5 UG/1; UG/1
2 AEROSOL RESPIRATORY (INHALATION) 2 TIMES DAILY
Status: DISCONTINUED | OUTPATIENT
Start: 2022-02-07 | End: 2022-02-09 | Stop reason: HOSPADM

## 2022-02-07 RX ORDER — METOPROLOL SUCCINATE 25 MG/1
25 TABLET, EXTENDED RELEASE ORAL DAILY
Status: DISCONTINUED | OUTPATIENT
Start: 2022-02-08 | End: 2022-02-08

## 2022-02-07 RX ORDER — ALBUTEROL SULFATE 2.5 MG/3ML
2.5 SOLUTION RESPIRATORY (INHALATION) EVERY 4 HOURS PRN
Status: DISCONTINUED | OUTPATIENT
Start: 2022-02-07 | End: 2022-02-08

## 2022-02-07 RX ORDER — SODIUM CHLORIDE 0.9 % (FLUSH) 0.9 %
5-40 SYRINGE (ML) INJECTION EVERY 12 HOURS SCHEDULED
Status: DISCONTINUED | OUTPATIENT
Start: 2022-02-07 | End: 2022-02-09 | Stop reason: HOSPADM

## 2022-02-07 RX ORDER — POTASSIUM CHLORIDE 20 MEQ/1
40 TABLET, EXTENDED RELEASE ORAL ONCE
Status: COMPLETED | OUTPATIENT
Start: 2022-02-07 | End: 2022-02-07

## 2022-02-07 RX ORDER — PREDNISONE 20 MG/1
TABLET ORAL
Status: ON HOLD | COMMUNITY
Start: 2022-02-07 | End: 2022-02-07

## 2022-02-07 RX ORDER — POLYETHYLENE GLYCOL 3350 17 G/17G
17 POWDER, FOR SOLUTION ORAL DAILY PRN
Status: DISCONTINUED | OUTPATIENT
Start: 2022-02-07 | End: 2022-02-09 | Stop reason: HOSPADM

## 2022-02-07 RX ORDER — ATORVASTATIN CALCIUM 80 MG/1
80 TABLET, FILM COATED ORAL DAILY
Status: DISCONTINUED | OUTPATIENT
Start: 2022-02-08 | End: 2022-02-09 | Stop reason: HOSPADM

## 2022-02-07 RX ORDER — ONDANSETRON 2 MG/ML
4 INJECTION INTRAMUSCULAR; INTRAVENOUS EVERY 6 HOURS PRN
Status: DISCONTINUED | OUTPATIENT
Start: 2022-02-07 | End: 2022-02-09 | Stop reason: HOSPADM

## 2022-02-07 RX ORDER — MAGNESIUM SULFATE 1 G/100ML
1000 INJECTION INTRAVENOUS ONCE
Status: COMPLETED | OUTPATIENT
Start: 2022-02-07 | End: 2022-02-07

## 2022-02-07 RX ORDER — SODIUM CHLORIDE, SODIUM LACTATE, POTASSIUM CHLORIDE, AND CALCIUM CHLORIDE .6; .31; .03; .02 G/100ML; G/100ML; G/100ML; G/100ML
1000 INJECTION, SOLUTION INTRAVENOUS ONCE
Status: COMPLETED | OUTPATIENT
Start: 2022-02-07 | End: 2022-02-07

## 2022-02-07 RX ORDER — ACETAMINOPHEN 325 MG/1
650 TABLET ORAL EVERY 6 HOURS PRN
Status: DISCONTINUED | OUTPATIENT
Start: 2022-02-07 | End: 2022-02-09 | Stop reason: HOSPADM

## 2022-02-07 RX ORDER — SODIUM CHLORIDE 9 MG/ML
INJECTION, SOLUTION INTRAVENOUS CONTINUOUS
Status: DISCONTINUED | OUTPATIENT
Start: 2022-02-07 | End: 2022-02-09 | Stop reason: HOSPADM

## 2022-02-07 RX ORDER — CALCIUM GLUCONATE 45(500) MG
500 TABLET ORAL DAILY
Status: DISCONTINUED | OUTPATIENT
Start: 2022-02-08 | End: 2022-02-07

## 2022-02-07 RX ORDER — ONDANSETRON 4 MG/1
4 TABLET, ORALLY DISINTEGRATING ORAL EVERY 8 HOURS PRN
Status: DISCONTINUED | OUTPATIENT
Start: 2022-02-07 | End: 2022-02-09 | Stop reason: HOSPADM

## 2022-02-07 RX ORDER — SODIUM CHLORIDE 0.9 % (FLUSH) 0.9 %
5-40 SYRINGE (ML) INJECTION PRN
Status: DISCONTINUED | OUTPATIENT
Start: 2022-02-07 | End: 2022-02-09 | Stop reason: HOSPADM

## 2022-02-07 RX ADMIN — SODIUM CHLORIDE: 9 INJECTION, SOLUTION INTRAVENOUS at 22:33

## 2022-02-07 RX ADMIN — POTASSIUM CHLORIDE 40 MEQ: 20 TABLET, EXTENDED RELEASE ORAL at 17:49

## 2022-02-07 RX ADMIN — MAGNESIUM SULFATE HEPTAHYDRATE 1000 MG: 1 INJECTION, SOLUTION INTRAVENOUS at 17:48

## 2022-02-07 RX ADMIN — ENOXAPARIN SODIUM 30 MG: 30 INJECTION SUBCUTANEOUS at 22:47

## 2022-02-07 RX ADMIN — IOPAMIDOL 75 ML: 755 INJECTION, SOLUTION INTRAVENOUS at 16:28

## 2022-02-07 RX ADMIN — SODIUM CHLORIDE, POTASSIUM CHLORIDE, SODIUM LACTATE AND CALCIUM CHLORIDE 1000 ML: 600; 310; 30; 20 INJECTION, SOLUTION INTRAVENOUS at 15:07

## 2022-02-07 RX ADMIN — SODIUM CHLORIDE, POTASSIUM CHLORIDE, SODIUM LACTATE AND CALCIUM CHLORIDE 1455 ML: 600; 310; 30; 20 INJECTION, SOLUTION INTRAVENOUS at 13:32

## 2022-02-07 ASSESSMENT — PAIN SCALES - GENERAL: PAINLEVEL_OUTOF10: 0

## 2022-02-07 NOTE — ED NOTES
This RN took call from lab with panic result. Pt's lactic 4.8 Dr. Sybil Fink made aware at this time.      Roxy Cabrera RN  02/07/22 4273

## 2022-02-07 NOTE — ED PROVIDER NOTES
[facility]        Pt Name: Irina Bruno  MRN: [id number]  [place] 1960  Date of evaluation: 2/7/2022  Provider: Ifeanyi Cruz MD  PCP: Octaviano Bass MD    This patient was seen and evaluated by the attending physician Ifeanyi Cruz MD.      [address] [place] [address]       Chief Complaint   Patient presents with    Concern For COVID-19     Short of breath, drowsy, febrile at home has been exposed to covid x2days. Congestion, body aches, headaches x2 weeks. Rapid and PCR both negative at pcp office. HX COPD. Bp 79/52 during triage       HISTORY OF PRESENT ILLNESS   (Location/Symptom, Timing/Onset, Context/Setting, Quality, Duration, Modifying Factors, Severity)  Note limiting factors. Irina Bruno is a 57 y.o. female with recent COVID exposures, though negative home, rapid, and PCR tests herself, brought in by family today with chief complaint of fevers hypoxia and ongoing myalgias headaches congestion cough and dyspnea. No vomiting or diarrhea does have some myalgias. History of emphysema for which she is on bronchodilators. Other committees as below and past medical history. Nursing Notes were all reviewed and agreed with or any disagreements were addressed  in the HPI. REVIEW OF SYSTEMS    (2-9 systems for level 4, 10 or more for level 5)     Review of Systems    Positives and Pertinent negatives as per HPI. Except as noted abovein the ROS, all other systems were reviewed and negative.        PAST MEDICAL HISTORY     Past Medical History:   Diagnosis Date    Abnormal screening mammogram 7/31/2017    Dx right pending    Anxiety     Arthritis     Asthma     Cholelithiases 3/17/2021    Colon polyps 10/30/2017    Multiple path pending 10.2017  Recheck 10.2020    Depression     Headache 8/21/2019    History of abnormal mammogram 7/31/2017    birads 3 right recheck 8.2018 9.2019 recheck 1 year 10.2020 birad 1    Hx of adenomatous colonic polyps 10/30/2017    adenoma 10.2017  Recheck 10.2020    MDD (recurrent major depressive disorder) in remission ([facility] [address] 3/11/2020    Multinodular thyroid 7/25/2017 7.2017 euthyroid recheck 1 year    Oral phase dysphagia 4/28/2021    Other osteoporosis without current pathological fracture 2/21/2020 2.2020  T-score of  -3.8 and a Z-score of -2.3.  spine; hip -3.0 and a Z-score of -1.7         SURGICAL HISTORY     Past Surgical History:   Procedure Laterality Date    TONSILLECTOMY      TUBAL LIGATION           CURRENTMEDICATIONS       Previous Medications    ALBUTEROL (PROVENTIL) (2.5 MG/3ML) 0.083% NEBULIZER SOLUTION    Take 3 mLs by nebulization every 4 hours as needed for Wheezing    ALBUTEROL SULFATE HFA (VENTOLIN HFA) 108 (90 BASE) MCG/ACT INHALER    Inhale 2 puffs into the lungs 4 times daily    ASPIRIN (ASPIRIN CHILDRENS) 81 MG CHEWABLE TABLET    Take 1 tablet by mouth daily    ATORVASTATIN (LIPITOR) 80 MG TABLET    TAKE ONE TABLET BY MOUTH DAILY    CALCIUM GLUCONATE 500 MG TABLET    Take 1 tablet by mouth daily    DOXYCYCLINE HYCLATE (VIBRAMYCIN) 100 MG CAPSULE        EZETIMIBE (ZETIA) 10 MG TABLET    Take 1 tablet by mouth daily In addition to atorvastatin for cholesterol    FLUTICASONE FUROATE-VILANTEROL (BREO ELLIPTA) 200-25 MCG/INH AEPB INHALER    Inhale 1 puff into the lungs daily One puff daily    METOPROLOL SUCCINATE (TOPROL XL) 25 MG EXTENDED RELEASE TABLET    TAKE ONE TABLET BY MOUTH DAILY    PREDNISONE (DELTASONE) 20 MG TABLET        TIOTROPIUM (SPIRIVA RESPIMAT) 2.5 MCG/ACT AERS INHALER    INHALE 2 PUFFS INTO THE LUNGS EVERY DAY    VITAMIN D (ERGOCALCIFEROL) 1.25 MG (39575 UT) CAPS CAPSULE    1 po q 2 weeks    ZOLEDRONIC ACID (RECLAST) 5 MG/100ML SOLN    Infuse 100 mLs intravenously once for 1 dose         ALLERGIES     Pcn [penicillins]    FAMILYHISTORY       Family History   Problem Relation Age of Onset    COPD Father     Breast Cancer Sister     Heart Attack Brother           SOCIAL HISTORY       Social History     Socioeconomic History    Marital status:      Spouse name: None    Number of children: None    Years of education: None    Highest education level: None   Occupational History    None   Tobacco Use    Smoking status: Current Every Day Smoker     Packs/day: 1.00     Years: 50.00     Pack years: 50.00     Types: Cigarettes    Smokeless tobacco: Never Used    Tobacco comment: cutting back now at 1/2 pack per day   Vaping Use    Vaping Use: Never used   Substance and Sexual Activity    Alcohol use: No    Drug use: No    Sexual activity: Yes     Partners: Male   Other Topics Concern    None   Social History Narrative    None     Social Determinants of Health     Financial Resource Strain: Low Risk     Difficulty of Paying Living Expenses: Not hard at all   Food Insecurity: No Food Insecurity    Worried About Running Out of Food in the Last Year: Never true    Felisha of Food in the Last Year: Never true   Transportation Needs:     Lack of Transportation (Medical): Not on file    Lack of Transportation (Non-Medical):  Not on file   Physical Activity:     Days of Exercise per Week: Not on file    Minutes of Exercise per Session: Not on file   Stress:     Feeling of Stress : Not on file   Social Connections:     Frequency of Communication with Friends and Family: Not on file    Frequency of Social Gatherings with Friends and Family: Not on file    Attends [employer] Services: Not on file    Active Member of Clubs or Organizations: Not on file    Attends Club or Organization Meetings: Not on file    Marital Status: Not on file   Intimate Partner Violence:     Fear of Current or Ex-Partner: Not on file    Emotionally Abused: Not on file    Physically Abused: Not on file    Sexually Abused: Not on file   Housing Stability:     Unable to Pay for Housing in the Last Year: Not on file    Number of [place] in the Last Year: Not on file    Unstable Housing in the Last Year: Not on file       SCREENINGS             PHYSICAL EXAM    (up to 7 for level 4, 8 or more for level 5)     ED Triage Vitals   BP Temp Temp src Pulse Resp SpO2 Height Weight   -- -- -- -- -- -- -- --       Physical Exam     General Appearance:  Alert, cooperative, no distress, appears stated age. Thin and emphysematous habitus. Hypotensive on monitor   Head:  Normocephalic, without obvious abnormality, atraumatic. Eyes:  conjunctiva/corneas clear, EOM's intact. Sclera anicteric. ENT: Mucous membranes moist.   Neck: Supple, symmetrical, trachea midline, no adenopathy. No jugular venous distention. Lungs:   No Respiratory Distress. Chest Wall:   Atraumatic. Heart:  RRR   Abdomen:   Soft, NT, ND   Extremities:  Full range of motion. Pulses: Symmetric x4   Skin:  No rashes or lesions to exposed skin. Neurologic: Alert and oriented X 3. Motor grossly normal.  Speech clear.           DIAGNOSTIC RESULTS   LABS:    Labs Reviewed   CBC WITH AUTO DIFFERENTIAL - Abnormal; Notable for the following components:       Result Value    WBC 3.7 (*)     RBC 3.94 (*)     Hematocrit 35.9 (*)     Lymphocytes Absolute 0.1 (*)     All other components within normal limits    Narrative:     Performed at:  [facility]  [address] [facility] [employer]   Phone [phone]   COMPREHENSIVE METABOLIC PANEL W/ REFLEX TO MG FOR LOW K - Abnormal; Notable for the following components:    Sodium 133 (*)     Potassium reflex Magnesium 3.1 (*)     Chloride 96 (*)     Glucose 193 (*)     GFR Non- 45 (*)     GFR  55 (*)     Total Protein 6.3 (*)     All other components within normal limits    Narrative:     Performed at:  [facility]  [address] [facility] [employer]   Phone [phone]   BRAIN NATRIURETIC PEPTIDE - Abnormal; Notable for the following components:    Pro-BNP 679 (*)     All other components within normal limits    Narrative:     Performed at:  [facility]  [address],  [place], [address]   Phone [phone]   APTT - Abnormal; Notable for the following components:    aPTT 39.8 (*)     All other components within normal limits    Narrative:     Performed at:  [facility]  [address],  [place], [address]   Phone [phone]   C-REACTIVE PROTEIN - Abnormal; Notable for the following components:    CRP 6.3 (*)     All other components within normal limits    Narrative:     Performed at:  [facility] [address],  [place], [address]   Phone [phone]   LACTATE, SEPSIS - Abnormal; Notable for the following components:    Lactic Acid, Sepsis 4.4 (*)     All other components within normal limits    Narrative:     Yanick Albright  [facility] tel. [phone],  Chemistry results called to and read back by RNMally, 02/07/2022  13:53, by [facility]  Performed at:  [facility] [address],  [place], [address]   Phone [phone]   BLOOD GAS, VENOUS - Abnormal; Notable for the following components:    pCO2, Ronald 39.2 (*)     pO2, Ronald 66.4 (*)     Carboxyhemoglobin 3.5 (*)     All other components within normal limits    Narrative:     Performed at:  [facility] [address],  [place], [address]   Phone [phone]   D-DIMER, QUANTITATIVE - Abnormal; Notable for the following components:    D-Dimer, Quant 1145 (*)     All other components within normal limits    Narrative:     Performed at:  [facility] [address],  [place], [address]   Phone [phone]   MAGNESIUM - Abnormal; Notable for the following components:    Magnesium 1.70 (*)     All other components within normal limits    Narrative:     Performed at:  [facility]  [address]  [place], [address]   Phone [phone]   RAPID INFLUENZA A/B ANTIGENS    Narrative:     Performed at:  [facility]  [address] [place], [address]   Phone [phone]   LEGIONELLA ANTIGEN, URINE   STREP PNEUMONIAE ANTIGEN   CULTURE, URINE   CULTURE, BLOOD 1   CULTURE, BLOOD 2   LIPASE    Narrative:     Performed at:  [facility] [address]  [place], [address]   Phone [phone]   AMYLASE    Narrative:     Performed at:  [facility] [address] [place]  [place], [address]   Phone [phone]   TROPONIN    Narrative:     Performed at:  [facility] [address] [place]  [place], [place]   Phone [phone]   PROTIME-INR    Narrative:     Performed at:  [facility] [address] [place]   Phone [phone]   SEDIMENTATION RATE    Narrative:     Performed at:  [facility] [address] [place]  [place], [place]   Phone [phone]   PROCALCITONIN    Narrative:     Performed at:  [facility] [address]  [place], [place]   Phone [phone]   URINALYSIS   LACTATE, SEPSIS   FERRITIN   COVID-19       All other labs were within normal range or not returned as of this dictation. EKG: All EKG's are interpreted by the Emergency Department Physician in the absence of a cardiologist.  Please see their note for interpretation of EKG. EKG is reviewed by myself. Dated today at 0. Rate 82 sinus rhythm normal EKG.     RADIOLOGY:   Non-plain film images such as CT, Ultrasound and MRI are read by the radiologist. Plain radiographic images are visualized andpreliminarily interpreted by the  ED Provider with the below findings:        Interpretation perthe Radiologist below, if available at the time of this note:    XR CHEST PORTABLE   Final Result   No acute process. CT CHEST PULMONARY EMBOLISM W CONTRAST    (Results Pending)     No results found. PROCEDURES   Unless otherwise noted below, none     Procedures    CRITICAL CARE TIME   N/A    CONSULTS:  IP CONSULT TO HOSPITALIST      EMERGENCY DEPARTMENT COURSE and DIFFERENTIAL DIAGNOSIS/MDM:   Vitals:    Vitals:    02/07/22 1443 02/07/22 1458 02/07/22 1514 02/07/22 1529   BP: (!) 69/38 (!) 89/49 (!) 81/50 (!) 96/56   Pulse: 83 81 87 75   Resp: 21 13 24 19   Temp:       TempSrc:       SpO2: 97% 97% 99% 99%   Weight:       Height:           Patient was given thefollowing medications:  Medications   lactated ringers infusion 1,455 mL (1,455 mLs IntraVENous New Bag 2/7/22 1332)   lactated ringers bolus (1,000 mLs IntraVENous New Bag 2/7/22 1507)   iopamidol (ISOVUE-370) 76 % injection 75 mL (75 mLs IntraVENous Given 2/7/22 1628)       57-year-old female here today with fevers hypoxia malaise congestion myalgias and found to be quite hypotensive. 80/50. Concern is for sepsis particular from coronavirus pneumonia although she has had recent negative PCR's and rapid tests. Ordered septic sets as above. Work-up. Has a persistent lactic acidosis so continue IV hydration. Pressures improved significantly. Not hypoxic. Chest x-ray without any acute process and a CT angiogram is obtained to r/o PE though my suspicion is low. Will admit  CS placed to hospitalist.      FINAL IMPRESSION      1. Suspected COVID-19 virus infection          DISPOSITION/PLAN   DISPOSITION        PATIENT REFERREDTO:  No follow-up provider specified.     DISCHARGE MEDICATIONS:  New Prescriptions    No medications on file       DISCONTINUED MEDICATIONS:  Discontinued Medications    No medications on file              (Please note that portions ofthis note were completed with a voice recognition program.  Efforts were made to edit the dictations but occasionally words are mis-transcribed.)    Sujata Melissa MD (electronically signed)           Sujata Melissa MD  02/07/22 5944

## 2022-02-07 NOTE — H&P
HOSPITALISTS HISTORY AND PHYSICAL    2/7/2022 5:57 PM    Patient Information:  Anna Foster is a 57 y.o. female [id number]  PCP:  Dennie Hensen, MD (Tel: [phone] )    Chief complaint:    Chief Complaint   Patient presents with    Concern For COVID-19     Short of breath, drowsy, febrile at home has been exposed to covid x2days. Congestion, body aches, headaches x2 weeks. Rapid and PCR both negative at pcp office. HX COPD. Bp 79/52 during triage     History of Present Illness:  Estefany Villatoro is a 57 y.o. female who was exposed to family members with Covid 2 weeks ago. Patient started to develop sore throat roughly 1 week ago had a runny nose for 1 day headache. No tendinitis no nausea vomiting or diarrhea no chest pain. Patient developed low-grade fevers yesterday and a fever of 102 today and has been having diffuse body myalgias. Patient tested negative for Covid PCR on Friday home test on Sunday and then rapid antigen at urgent care again today. Patient is vaccinated with 2 doses of maternal vaccine no other sick contacts other than daughter and son-in-law who had Covid 2 weeks ago. REVIEW OF SYSTEMS:   Constitutional:see above  ENT:see above  Respiratory: Negative for shortness of breath,wheezing  Cardiovascular: Negative for chest pain, palpitations   Gastrointestinal: Negative for nausea, vomiting, diarrhea  Genitourinary: Negative for polyuria, dysuria   Hematologic/Lymphatic: Negative for bleeding tendency, easy bruising  Musculoskeletal: Negative for myalgias and arthralgias  Neurologic: Negative for confusion,dysarthria. Skin: Negative for itching,rash  Psychiatric: Negative for depression,anxiety, agitation. Endocrine: Negative for polydipsia,polyuria,heat /cold intolerance.     Past Medical History:   has a past medical history of Abnormal screening mammogram, Anxiety, Arthritis, Asthma, Cholelithiases, Colon polyps, Depression, Headache, History of abnormal mammogram, Hx of adenomatous colonic polyps, MDD (recurrent major depressive disorder) in remission ([facility] [address] Multinodular thyroid, Oral phase dysphagia, and Other osteoporosis without current pathological fracture. Past Surgical History:   has a past surgical history that includes Tonsillectomy and Tubal ligation. Medications:  No current facility-administered medications on file prior to encounter. Current Outpatient Medications on File Prior to Encounter   Medication Sig Dispense Refill    doxycycline hyclate (VIBRAMYCIN) 100 MG capsule       predniSONE (DELTASONE) 20 MG tablet       tiotropium (SPIRIVA RESPIMAT) 2.5 MCG/ACT AERS inhaler INHALE 2 PUFFS INTO THE LUNGS EVERY DAY 3 each 3    Fluticasone furoate-vilanterol (BREO ELLIPTA) 200-25 MCG/INH AEPB inhaler Inhale 1 puff into the lungs daily One puff daily 3 each 3    albuterol sulfate HFA (VENTOLIN HFA) 108 (90 Base) MCG/ACT inhaler Inhale 2 puffs into the lungs 4 times daily 3 each 3    metoprolol succinate (TOPROL XL) 25 MG extended release tablet TAKE ONE TABLET BY MOUTH DAILY 90 tablet 3    zoledronic acid (RECLAST) 5 MG/100ML SOLN Infuse 100 mLs intravenously once for 1 dose 100 mL 0    aspirin (ASPIRIN CHILDRENS) 81 MG chewable tablet Take 1 tablet by mouth daily 30 tablet 3    atorvastatin (LIPITOR) 80 MG tablet TAKE ONE TABLET BY MOUTH DAILY 90 tablet 3    albuterol (PROVENTIL) (2.5 MG/3ML) 0.083% nebulizer solution Take 3 mLs by nebulization every 4 hours as needed for Wheezing 120 mL 5    ezetimibe (ZETIA) 10 MG tablet Take 1 tablet by mouth daily In addition to atorvastatin for cholesterol 90 tablet 0    vitamin D (ERGOCALCIFEROL) 1.25 MG (32512 UT) CAPS capsule 1 po q 2 weeks 6 capsule 3    calcium gluconate 500 MG tablet Take 1 tablet by mouth daily 30 tablet 5       Allergies:   Allergies   Allergen Reactions    Pcn [Penicillins] Social History:  Patient Lives at home   reports that she has been smoking cigarettes. She has a 50.00 pack-year smoking history. She has never used smokeless tobacco. She reports that she does not drink alcohol and does not use drugs. Family History:  family history includes Breast Cancer in her sister; COPD in her father; Heart Attack in her brother. ,     Physical Exam:  BP (!) 88/56   Pulse 77   Temp 98.4 °F (36.9 °C) (Oral)   Resp 25   Ht 5' 4\" (1.626 m)   Wt 107 lb (48.5 kg)   SpO2 96%   BMI 18.37 kg/m²     General appearance:  Appears comfortable. AAOx3  HEENT: atraumatic, Pupils equal, muscous membranes moist, no masses appreciated  Cardiovascular: Regular rate and rhythm no murmurs appreciated  Respiratory: CTAB no wheezing  Gastrointestinal: Abdomen soft, non-tender, BS+  EXT: no edema  Neurology: no gross focal deficts  Psychiatry: Appropriate affect. Not agitated  Skin: Warm, dry, no rashes appreciated    Labs:  CBC:   Lab Results   Component Value Date    WBC 3.7 02/07/2022    RBC 3.94 02/07/2022    HGB 12.3 02/07/2022    HCT 35.9 02/07/2022    MCV 91.3 02/07/2022    MCH 31.3 02/07/2022    MCHC 34.3 02/07/2022    RDW 13.0 02/07/2022     02/07/2022    MPV 8.2 02/07/2022     BMP:    Lab Results   Component Value Date     02/07/2022    K 3.1 02/07/2022    CL 96 02/07/2022    CO2 21 02/07/2022    BUN 12 02/07/2022    CREATININE 1.2 02/07/2022    CALCIUM 8.4 02/07/2022    GFRAA 55 02/07/2022    GFRAA >60 05/23/2013    LABGLOM 45 02/07/2022    GLUCOSE 193 02/07/2022     CT CHEST PULMONARY EMBOLISM W CONTRAST   Final Result   No pulmonary embolus is identified. Stable 6 mm nodule in the right upper lobe. Continued surveillance by LDCT   is recommended, with follow-up LDCT in 12 months. Centrilobular emphysema. XR CHEST PORTABLE   Final Result   No acute process.          VL Extremity Venous Bilateral    (Results Pending)       Recent imaging reviewed    Problem

## 2022-02-08 PROBLEM — U07.1 COVID-19: Status: ACTIVE | Noted: 2022-02-08

## 2022-02-08 PROBLEM — J43.2 CENTRILOBULAR EMPHYSEMA (HCC): Status: ACTIVE | Noted: 2017-07-25

## 2022-02-08 PROBLEM — R63.6 UNDERWEIGHT: Status: ACTIVE | Noted: 2022-02-08

## 2022-02-08 PROBLEM — E86.0 DEHYDRATION: Status: ACTIVE | Noted: 2022-02-08

## 2022-02-08 LAB
A/G RATIO: 1.7 (ref 1.1–2.2)
ALBUMIN SERPL-MCNC: 3.3 G/DL (ref 3.4–5)
ALP BLD-CCNC: 78 U/L (ref 40–129)
ALT SERPL-CCNC: 16 U/L (ref 10–40)
ANION GAP SERPL CALCULATED.3IONS-SCNC: 9 MMOL/L (ref 3–16)
ANISOCYTOSIS: ABNORMAL
AST SERPL-CCNC: 30 U/L (ref 15–37)
BANDED NEUTROPHILS RELATIVE PERCENT: 3 % (ref 0–7)
BASOPHILS ABSOLUTE: 0 K/UL (ref 0–0.2)
BASOPHILS RELATIVE PERCENT: 0 %
BILIRUB SERPL-MCNC: <0.2 MG/DL (ref 0–1)
BUN BLDV-MCNC: 11 MG/DL (ref 7–20)
CALCIUM SERPL-MCNC: 8.1 MG/DL (ref 8.3–10.6)
CHLORIDE BLD-SCNC: 110 MMOL/L (ref 99–110)
CO2: 24 MMOL/L (ref 21–32)
CREAT SERPL-MCNC: 0.7 MG/DL (ref 0.6–1.2)
EOSINOPHILS ABSOLUTE: 0 K/UL (ref 0–0.6)
EOSINOPHILS RELATIVE PERCENT: 0 %
GFR AFRICAN AMERICAN: >60
GFR NON-AFRICAN AMERICAN: >60
GLUCOSE BLD-MCNC: 112 MG/DL (ref 70–99)
HCT VFR BLD CALC: 32.3 % (ref 36–48)
HEMOGLOBIN: 10.9 G/DL (ref 12–16)
HYPOCHROMIA: ABNORMAL
LYMPHOCYTES ABSOLUTE: 0.3 K/UL (ref 1–5.1)
LYMPHOCYTES RELATIVE PERCENT: 9 %
MAGNESIUM: 2 MG/DL (ref 1.8–2.4)
MCH RBC QN AUTO: 30.7 PG (ref 26–34)
MCHC RBC AUTO-ENTMCNC: 33.6 G/DL (ref 31–36)
MCV RBC AUTO: 91.1 FL (ref 80–100)
METAMYELOCYTES RELATIVE PERCENT: 1 %
MONOCYTES ABSOLUTE: 0.2 K/UL (ref 0–1.3)
MONOCYTES RELATIVE PERCENT: 6 %
NEUTROPHILS ABSOLUTE: 2.8 K/UL (ref 1.7–7.7)
NEUTROPHILS RELATIVE PERCENT: 81 %
NUCLEATED RED BLOOD CELLS: 1 /100 WBC
PDW BLD-RTO: 13 % (ref 12.4–15.4)
PLATELET # BLD: 171 K/UL (ref 135–450)
PLATELET SLIDE REVIEW: ADEQUATE
PMV BLD AUTO: 7.7 FL (ref 5–10.5)
POTASSIUM REFLEX MAGNESIUM: 4.4 MMOL/L (ref 3.5–5.1)
PROCALCITONIN: 0.09 NG/ML (ref 0–0.15)
RBC # BLD: 3.54 M/UL (ref 4–5.2)
SARS-COV-2: DETECTED
SLIDE REVIEW: ABNORMAL
SODIUM BLD-SCNC: 143 MMOL/L (ref 136–145)
TOTAL PROTEIN: 5.2 G/DL (ref 6.4–8.2)
URINE CULTURE, ROUTINE: NORMAL
WBC # BLD: 3.3 K/UL (ref 4–11)

## 2022-02-08 PROCEDURE — 94761 N-INVAS EAR/PLS OXIMETRY MLT: CPT

## 2022-02-08 PROCEDURE — 93970 EXTREMITY STUDY: CPT

## 2022-02-08 PROCEDURE — 1200000000 HC SEMI PRIVATE

## 2022-02-08 PROCEDURE — 6360000002 HC RX W HCPCS: Performed by: INTERNAL MEDICINE

## 2022-02-08 PROCEDURE — 83735 ASSAY OF MAGNESIUM: CPT

## 2022-02-08 PROCEDURE — 84145 PROCALCITONIN (PCT): CPT

## 2022-02-08 PROCEDURE — 80053 COMPREHEN METABOLIC PANEL: CPT

## 2022-02-08 PROCEDURE — 6370000000 HC RX 637 (ALT 250 FOR IP): Performed by: INTERNAL MEDICINE

## 2022-02-08 PROCEDURE — 2580000003 HC RX 258: Performed by: INTERNAL MEDICINE

## 2022-02-08 PROCEDURE — 6370000000 HC RX 637 (ALT 250 FOR IP): Performed by: FAMILY MEDICINE

## 2022-02-08 PROCEDURE — 94640 AIRWAY INHALATION TREATMENT: CPT

## 2022-02-08 PROCEDURE — 85025 COMPLETE CBC W/AUTO DIFF WBC: CPT

## 2022-02-08 PROCEDURE — 36415 COLL VENOUS BLD VENIPUNCTURE: CPT

## 2022-02-08 RX ORDER — GUAIFENESIN 600 MG/1
600 TABLET, EXTENDED RELEASE ORAL 2 TIMES DAILY
Status: DISCONTINUED | OUTPATIENT
Start: 2022-02-08 | End: 2022-02-09 | Stop reason: HOSPADM

## 2022-02-08 RX ORDER — NICOTINE 21 MG/24HR
1 PATCH, TRANSDERMAL 24 HOURS TRANSDERMAL DAILY
Status: DISCONTINUED | OUTPATIENT
Start: 2022-02-08 | End: 2022-02-09 | Stop reason: HOSPADM

## 2022-02-08 RX ORDER — ALBUTEROL SULFATE 90 UG/1
2 AEROSOL, METERED RESPIRATORY (INHALATION) EVERY 4 HOURS PRN
Status: DISCONTINUED | OUTPATIENT
Start: 2022-02-08 | End: 2022-02-09 | Stop reason: HOSPADM

## 2022-02-08 RX ORDER — ALBUTEROL SULFATE 90 UG/1
2 AEROSOL, METERED RESPIRATORY (INHALATION) 2 TIMES DAILY
Status: DISCONTINUED | OUTPATIENT
Start: 2022-02-08 | End: 2022-02-09 | Stop reason: HOSPADM

## 2022-02-08 RX ADMIN — ENOXAPARIN SODIUM 30 MG: 30 INJECTION SUBCUTANEOUS at 19:55

## 2022-02-08 RX ADMIN — SODIUM CHLORIDE, PRESERVATIVE FREE 10 ML: 5 INJECTION INTRAVENOUS at 09:00

## 2022-02-08 RX ADMIN — Medication 2 PUFF: at 20:53

## 2022-02-08 RX ADMIN — SODIUM CHLORIDE 500 MG: 9 INJECTION, SOLUTION INTRAVENOUS at 17:24

## 2022-02-08 RX ADMIN — GUAIFENESIN 600 MG: 600 TABLET ORAL at 19:55

## 2022-02-08 RX ADMIN — ENOXAPARIN SODIUM 30 MG: 30 INJECTION SUBCUTANEOUS at 08:58

## 2022-02-08 RX ADMIN — ASPIRIN 81 MG 81 MG: 81 TABLET ORAL at 08:58

## 2022-02-08 RX ADMIN — ATORVASTATIN CALCIUM 80 MG: 80 TABLET, FILM COATED ORAL at 08:58

## 2022-02-08 RX ADMIN — Medication 2 PUFF: at 20:52

## 2022-02-08 ASSESSMENT — PAIN SCALES - GENERAL
PAINLEVEL_OUTOF10: 0

## 2022-02-08 NOTE — PROGRESS NOTES
Hospital Medicine Progress Note     Date:  2/8/2022    PCP: Pat Dean MD (Tel: [phone])    Date of Admission: 2/7/2022      Subjective  Patient feels much better, denies any acute complaints. Objective  Physical exam:  Vitals: /60   Pulse 57   Temp 97.9 °F (36.6 °C) (Temporal)   Resp 19   Ht 5' 4\" (1.626 m)   Wt 110 lb 6.4 oz (50.1 kg)   SpO2 95%   BMI 18.95 kg/m²   Gen: Not in distress. Alert. Frail/cachectic, appears chronically ill  Head: Normocephalic. Atraumatic. Eyes: EOMI. Good acuity. ENT: Oral mucosa moist  Neck: No JVD. No obvious thyromegaly. CVS: Nml S1S2, no MRG, RRR  Pulmomary: Nearly absent breath sounds bilaterally, no wheezing  Gastrointestinal: Soft, NT/ND. Positive bowel sounds. Musculoskeletal: No edema. Warm  Neuro: No focal deficit. Moves extremity spontaneously. Psychiatry: Appropriate affect. Not agitated. Skin: Warm, dry with normal turgor. No rash      24HR INTAKE/OUTPUT:    Intake/Output Summary (Last 24 hours) at 2/8/2022 1540  Last data filed at 2/8/2022 0220  Gross per 24 hour   Intake 110 ml   Output --   Net 110 ml     I/O last 3 completed shifts: In: 110 [I.V.:110]  Out: -   No intake/output data recorded.       Meds:    albuterol sulfate HFA  2 puff Inhalation BID    nicotine  1 patch TransDERmal Daily    guaiFENesin  600 mg Oral BID    aspirin  81 mg Oral Daily    atorvastatin  80 mg Oral Daily    budesonide-formoterol  2 puff Inhalation BID    tiotropium  2 puff Inhalation Daily    sodium chloride flush  5-40 mL IntraVENous 2 times per day    enoxaparin  30 mg SubCUTAneous BID       Infusions:    sodium chloride 100 mL/hr at 02/07/22 2233    sodium chloride           PRN Meds: albuterol sulfate HFA, sodium chloride flush, sodium chloride, ondansetron **OR** ondansetron, polyethylene glycol, acetaminophen **OR** acetaminophen    Labs/imaging:  CBC:   Recent Labs     02/07/22  1323 02/08/22  0456   WBC 3.7* 3.3*   HGB 12.3 10.9*    171         BMP:    Recent Labs     02/07/22  1323 02/08/22  0456   * 143   K 3.1* 4.4   CL 96* 110   CO2 21 24   BUN 12 11   CREATININE 1.2 0.7   GLUCOSE 193* 112*         Hepatic:   Recent Labs     02/07/22  1323 02/08/22  0456   AST 31 30   ALT 19 16   BILITOT <0.2 <0.2   ALKPHOS 94 78       Troponin:   Recent Labs     02/07/22  1323   TROPONINI <0.01         BNP: No results for input(s): BNP in the last 72 hours. INR:   Recent Labs     02/07/22  1323   INR 1.04           Reviewed imaging and reports noted    Assessment:  Active Problems:    Panlobular emphysema (HCC)    Tobacco dependence    Familial hypercholesterolemia    Sepsis ([facility] [address]    COVID-19    Underweight    Dehydration  Resolved Problems:    * No resolved hospital problems. *        Plan:  Covid 19 infection  -Vaccinated, no booster  -Continue fluids and supportive care, no indication for steroids at this time as patient is not hypoxic      Centrilobular emphysema  -Not in acute exacerbation, continue inhalers        Dehydration  -Resolved with IV fluids      Sepsis was suspected but likely SIRS present on admission (hypotension, lactic acidosis, leukopenia)  -Suspect secondary to COVID and dehydration, cultures pending  -No indication for antibiotics at this time        Electrolyte abnormality  -Resolved with replacement and fluids, continue to monitor      Familial hypercholesterolemia  -Continue statin      Tobacco dependence  -NicoDerm, encouraged and advised to quit      Underweight          Diet: ADULT DIET;  Regular    Activity: Up as tolerated  Prophylaxis: Lovenox    Code status: Full Code     ----------        Forrest Alvarez MD  -------------------------------  Rounding hospitalist

## 2022-02-08 NOTE — PROGRESS NOTES
Physician Progress Note      PATIENT:               Jona Pugh  CSN #:                  [id number]  :                       1960  ADMIT DATE:       2022 12:42 PM  [address] [place] [place] DATE:  RESPONDING  PROVIDER #:        Ceferino Arriaga MD          QUERY TEXT:    Patient admitted with Sepsis. If possible, please document in progress notes   and discharge summary if you are evaluating and /or treating any of the   following: The medical record reflects the following:  Risk Factors: BMI of 18.95  Clinical Indicators: Patient admitted with Sepsis and BMI of 18.95  Treatment: Weights and measurements    ASPEN Criteria:    https://aspenjournals. onlinelibrary. [place]. com/doi/full/10.1177/715014687801691  5  Options provided:  -- Underweight with BMI 18.95  -- Other - I will add my own diagnosis  -- Disagree - Not applicable / Not valid  -- Disagree - Clinically unable to determine / Unknown  -- Refer to Clinical Documentation Reviewer    PROVIDER RESPONSE TEXT:    This patient is underweight with a BMI of 18.95.     Query created by: Tonja English on 2022 12:08 PM      Electronically signed by:  Ceferino Arriaga MD 2022 3:15 PM

## 2022-02-08 NOTE — RT PROTOCOL NOTE
RT Inhaler-Nebulizer Bronchodilator Protocol Note    There is a bronchodilator order in the chart from a provider indicating to follow the RT Bronchodilator Protocol and there is an Initiate RT Inhaler-Nebulizer Bronchodilator Protocol order as well (see protocol at bottom of note). CXR Findings:  XR CHEST PORTABLE    Result Date: 2/7/2022  No acute process. The findings from the last RT Protocol Assessment were as follows:   History Pulmonary Disease: Chronic pulmonary disease  Respiratory Pattern: Regular pattern and RR 12-20 bpm  Breath Sounds: Slightly diminished and/or crackles  Cough: Strong, spontaneous, non-productive  Indication for Bronchodilator Therapy: Decreased or absent breath sounds  Bronchodilator Assessment Score: 4    Aerosolized bronchodilator medication orders have been revised according to the RT Inhaler-Nebulizer Bronchodilator Protocol below. Respiratory Therapist to perform RT Therapy Protocol Assessment initially then follow the protocol. Repeat RT Therapy Protocol Assessment PRN for score 0-3 or on second treatment, BID, and PRN for scores above 3. No Indications - adjust the frequency to every 6 hours PRN wheezing or bronchospasm, if no treatments needed after 48 hours then discontinue using Per Protocol order mode. If indication present, adjust the RT bronchodilator orders based on the Bronchodilator Assessment Score as indicated below. Use Inhaler orders unless patient has one or more of the following: on home nebulizer, not able to hold breath for 10 seconds, is not alert and oriented, cannot activate and use MDI correctly, or respiratory rate 25 breaths per minute or more, then use the equivalent nebulizer order(s) with same Frequency and PRN reasons based on the score. If a patient is on this medication at home then do not decrease Frequency below that used at home.     0-3 - enter or revise RT bronchodilator order(s) to equivalent RT Bronchodilator order with Frequency of every 4 hours PRN for wheezing or increased work of breathing using Per Protocol order mode. 4-6 - enter or revise RT Bronchodilator order(s) to two equivalent RT bronchodilator orders with one order with BID Frequency and one order with Frequency of every 4 hours PRN wheezing or increased work of breathing using Per Protocol order mode. 7-10 - enter or revise RT Bronchodilator order(s) to two equivalent RT bronchodilator orders with one order with TID Frequency and one order with Frequency of every 4 hours PRN wheezing or increased work of breathing using Per Protocol order mode. 11-13 - enter or revise RT Bronchodilator order(s) to one equivalent RT bronchodilator order with QID Frequency and an Albuterol order with Frequency of every 4 hours PRN wheezing or increased work of breathing using Per Protocol order mode. Greater than 13 - enter or revise RT Bronchodilator order(s) to one equivalent RT bronchodilator order with every 4 hours Frequency and an Albuterol order with Frequency of every 2 hours PRN wheezing or increased work of breathing using Per Protocol order mode. RT to enter RT Home Evaluation for COPD & MDI Assessment order using Per Protocol order mode.     Electronically signed by Soni Presley RCP on 2/8/2022 at 2:25 AM

## 2022-02-08 NOTE — PROGRESS NOTES
Shift assessment completed. A&Ox4. VSS. Patient in SR on the monitor, afebrile. Denies pain at this time. Independently up to the restroom, placed back on the monitor when returned to bed. Call light within reach. Breakfast on bedside table, next to the patient. See flowsheets for further details.

## 2022-02-08 NOTE — PROGRESS NOTES
4 Eyes Skin Assessment     NAME:  Nadeem Rodas  YOB: 1960  MEDICAL RECORD NUMBER:  [id number]    The patient is being assess for  Admission    I agree that 2 RN's have performed a thorough Head to Toe Skin Assessment on the patient. ALL assessment sites listed below have been assessed. Areas assessed by both nurses:    Head, Face, Ears, Shoulders, Back, Chest, Arms, Elbows, Hands, Sacrum. Buttock, Coccyx, Ischium and Legs. Feet and Heels        Does the Patient have a Wound?  No noted wound(s)       Tor Prevention initiated:  No   Wound Care Orders initiated:  NA    Pressure Injury (Stage 3,4, Unstageable, DTI, NWPT, and Complex wounds) if present place consult order under [de-identified] NA    New and Established Ostomies if present place consult order under : NA      Nurse 1 eSignature: Electronically signed by Jozef Black RN on 2/7/22 at 11:23 PM EST    **SHARE this note so that the co-signing nurse is able to place an eSignature**    Nurse 2 eSignature: Electronically signed by Stephen Gilman RN on 2/8/22 at 6:56 AM EST

## 2022-02-08 NOTE — CARE COORDINATION
Discharge Planning:     (CM) reviewed the patient's chart to assess needs. Patient's Readmission Risk Score is 11%. Patient's medical insurance is Medicare and Medicaid. Patient's PCP is Dr. Akua Alvarez. No needs anticipated, at this time. CM team to follow. Staff to inform CM if additional discharge needs arise.         Dianna AUGUSTE, JAGDEEP, [facility] -   [phone]    Electronically signed by KALYANI Rojas on 2/8/2022 at 9:06 AM

## 2022-02-08 NOTE — PROGRESS NOTES
Patient notified of positive COVID results. Remains in isolation at this time. No other questions from patient. Call light within reach.

## 2022-02-09 VITALS
SYSTOLIC BLOOD PRESSURE: 113 MMHG | HEART RATE: 56 BPM | DIASTOLIC BLOOD PRESSURE: 63 MMHG | HEIGHT: 64 IN | WEIGHT: 107.8 LBS | TEMPERATURE: 98.5 F | OXYGEN SATURATION: 97 % | RESPIRATION RATE: 19 BRPM | BODY MASS INDEX: 18.4 KG/M2

## 2022-02-09 LAB
A/G RATIO: 1.6 (ref 1.1–2.2)
ALBUMIN SERPL-MCNC: 3.1 G/DL (ref 3.4–5)
ALP BLD-CCNC: 75 U/L (ref 40–129)
ALT SERPL-CCNC: 20 U/L (ref 10–40)
ANION GAP SERPL CALCULATED.3IONS-SCNC: 9 MMOL/L (ref 3–16)
AST SERPL-CCNC: 39 U/L (ref 15–37)
BASOPHILS ABSOLUTE: 0 K/UL (ref 0–0.2)
BASOPHILS RELATIVE PERCENT: 0.3 %
BILIRUB SERPL-MCNC: <0.2 MG/DL (ref 0–1)
BUN BLDV-MCNC: 11 MG/DL (ref 7–20)
CALCIUM SERPL-MCNC: 7.9 MG/DL (ref 8.3–10.6)
CHLORIDE BLD-SCNC: 111 MMOL/L (ref 99–110)
CO2: 23 MMOL/L (ref 21–32)
CREAT SERPL-MCNC: 0.8 MG/DL (ref 0.6–1.2)
D DIMER: 955 NG/ML DDU (ref 0–229)
EOSINOPHILS ABSOLUTE: 0 K/UL (ref 0–0.6)
EOSINOPHILS RELATIVE PERCENT: 0 %
GFR AFRICAN AMERICAN: >60
GFR NON-AFRICAN AMERICAN: >60
GLUCOSE BLD-MCNC: 91 MG/DL (ref 70–99)
HCT VFR BLD CALC: 31.5 % (ref 36–48)
HEMOGLOBIN: 10.6 G/DL (ref 12–16)
L. PNEUMOPHILA SEROGP 1 UR AG: NORMAL
LACTIC ACID: 1.9 MMOL/L (ref 0.4–2)
LYMPHOCYTES ABSOLUTE: 1.3 K/UL (ref 1–5.1)
LYMPHOCYTES RELATIVE PERCENT: 29.5 %
MAGNESIUM: 1.7 MG/DL (ref 1.8–2.4)
MCH RBC QN AUTO: 31.2 PG (ref 26–34)
MCHC RBC AUTO-ENTMCNC: 33.8 G/DL (ref 31–36)
MCV RBC AUTO: 92.4 FL (ref 80–100)
MONOCYTES ABSOLUTE: 0.4 K/UL (ref 0–1.3)
MONOCYTES RELATIVE PERCENT: 9.1 %
NEUTROPHILS ABSOLUTE: 2.7 K/UL (ref 1.7–7.7)
NEUTROPHILS RELATIVE PERCENT: 61.1 %
PDW BLD-RTO: 12.8 % (ref 12.4–15.4)
PLATELET # BLD: 136 K/UL (ref 135–450)
PMV BLD AUTO: 8.1 FL (ref 5–10.5)
POTASSIUM REFLEX MAGNESIUM: 4.2 MMOL/L (ref 3.5–5.1)
RBC # BLD: 3.41 M/UL (ref 4–5.2)
SODIUM BLD-SCNC: 143 MMOL/L (ref 136–145)
STREP PNEUMONIAE ANTIGEN, URINE: NORMAL
TOTAL PROTEIN: 5 G/DL (ref 6.4–8.2)
WBC # BLD: 4.4 K/UL (ref 4–11)

## 2022-02-09 PROCEDURE — 6370000000 HC RX 637 (ALT 250 FOR IP): Performed by: INTERNAL MEDICINE

## 2022-02-09 PROCEDURE — 97530 THERAPEUTIC ACTIVITIES: CPT

## 2022-02-09 PROCEDURE — 83735 ASSAY OF MAGNESIUM: CPT

## 2022-02-09 PROCEDURE — 6360000002 HC RX W HCPCS: Performed by: INTERNAL MEDICINE

## 2022-02-09 PROCEDURE — 80053 COMPREHEN METABOLIC PANEL: CPT

## 2022-02-09 PROCEDURE — 94761 N-INVAS EAR/PLS OXIMETRY MLT: CPT

## 2022-02-09 PROCEDURE — 36415 COLL VENOUS BLD VENIPUNCTURE: CPT

## 2022-02-09 PROCEDURE — 6370000000 HC RX 637 (ALT 250 FOR IP): Performed by: FAMILY MEDICINE

## 2022-02-09 PROCEDURE — 94640 AIRWAY INHALATION TREATMENT: CPT

## 2022-02-09 PROCEDURE — 85379 FIBRIN DEGRADATION QUANT: CPT

## 2022-02-09 PROCEDURE — 83605 ASSAY OF LACTIC ACID: CPT

## 2022-02-09 PROCEDURE — 97161 PT EVAL LOW COMPLEX 20 MIN: CPT

## 2022-02-09 PROCEDURE — 97165 OT EVAL LOW COMPLEX 30 MIN: CPT

## 2022-02-09 PROCEDURE — 85025 COMPLETE CBC W/AUTO DIFF WBC: CPT

## 2022-02-09 RX ORDER — GUAIFENESIN 600 MG/1
600 TABLET, EXTENDED RELEASE ORAL 2 TIMES DAILY
Qty: 30 TABLET | Refills: 0 | Status: SHIPPED | OUTPATIENT
Start: 2022-02-09 | End: 2022-02-24

## 2022-02-09 RX ORDER — NICOTINE 21 MG/24HR
1 PATCH, TRANSDERMAL 24 HOURS TRANSDERMAL DAILY
Qty: 30 PATCH | Refills: 0 | Status: SHIPPED | OUTPATIENT
Start: 2022-02-10 | End: 2022-06-21

## 2022-02-09 RX ADMIN — Medication 2 PUFF: at 09:29

## 2022-02-09 RX ADMIN — ATORVASTATIN CALCIUM 80 MG: 80 TABLET, FILM COATED ORAL at 08:57

## 2022-02-09 RX ADMIN — TIOTROPIUM BROMIDE INHALATION SPRAY 2 PUFF: 3.12 SPRAY, METERED RESPIRATORY (INHALATION) at 09:29

## 2022-02-09 RX ADMIN — ENOXAPARIN SODIUM 30 MG: 30 INJECTION SUBCUTANEOUS at 08:57

## 2022-02-09 RX ADMIN — ASPIRIN 81 MG 81 MG: 81 TABLET ORAL at 08:57

## 2022-02-09 ASSESSMENT — PAIN SCALES - GENERAL
PAINLEVEL_OUTOF10: 0
PAINLEVEL_OUTOF10: 0

## 2022-02-09 NOTE — PROGRESS NOTES
Discharge instructions reviewed with patient. AVS signed by patient and RN. Instructions provided to [facility], patient's daughter, as well. All questions answered. Patient to discharge home with daughter. All patient belongings taken with patient. Closet and side table clear of any belongings. Patient taken to daughter's car via wheelchair, assisted into vehicle.

## 2022-02-09 NOTE — FLOWSHEET NOTE
Pt up to br to void and had an episode of diarrhea self darryl care completed. Pt ambulated back to bed with no assistance. IVF infusing. Pt on RA sat 95, denies any sob or pain.

## 2022-02-09 NOTE — PLAN OF CARE
Problem: Airway Clearance - Ineffective  Goal: Achieve or maintain patent airway  2/9/2022 0328 by Milka Cano RN  Outcome: Ongoing     Problem: Gas Exchange - Impaired  Goal: Absence of hypoxia  2/9/2022 0328 by Milka Cano RN  Outcome: Ongoing     Problem: Isolation Precautions - Risk of Spread of Infection  Goal: Prevent transmission of infection  2/9/2022 0328 by Milka Cano RN  Outcome: Ongoing

## 2022-02-09 NOTE — PLAN OF CARE
Problem: Skin Integrity:  Goal: Will show no infection signs and symptoms  Description: Will show no infection signs and symptoms  Outcome: Completed  Goal: Absence of new skin breakdown  Description: Absence of new skin breakdown  Outcome: Completed     Problem: Airway Clearance - Ineffective  Goal: Achieve or maintain patent airway  2/9/2022 1116 by Nevin Billingsley RN  Outcome: Completed  2/9/2022 0328 by Milka Cano RN  Outcome: Ongoing     Problem: Gas Exchange - Impaired  Goal: Absence of hypoxia  2/9/2022 1116 by Nevin Billingsley RN  Outcome: Completed  2/9/2022 0328 by Milka Cano RN  Outcome: Ongoing  Goal: Promote optimal lung function  Outcome: Completed     Problem: Breathing Pattern - Ineffective  Goal: Ability to achieve and maintain a regular respiratory rate  Outcome: Completed     Problem:  Body Temperature -  Risk of, Imbalanced  Goal: Ability to maintain a body temperature within defined limits  Outcome: Completed  Goal: Will regain or maintain usual level of consciousness  Outcome: Completed  Goal: Complications related to the disease process, condition or treatment will be avoided or minimized  Outcome: Completed     Problem: Isolation Precautions - Risk of Spread of Infection  Goal: Prevent transmission of infection  2/9/2022 1116 by Nevin Billingsley RN  Outcome: Completed  2/9/2022 0328 by Milka Cano RN  Outcome: Ongoing     Problem: Nutrition Deficits  Goal: Optimize nutritional status  Outcome: Completed     Problem: Risk for Fluid Volume Deficit  Goal: Maintain normal heart rhythm  Outcome: Completed  Goal: Maintain absence of muscle cramping  Outcome: Completed  Goal: Maintain normal serum potassium, sodium, calcium, phosphorus, and pH  Outcome: Completed     Problem: Loneliness or Risk for Loneliness  Goal: Demonstrate positive use of time alone when socialization is not possible  Outcome: Completed     Problem: Fatigue  Goal: Verbalize increase energy and improved vitality  Outcome: Completed     Problem: Patient Education: Go to Patient Education Activity  Goal: Patient/Family Education  Outcome: Completed

## 2022-02-09 NOTE — PROGRESS NOTES
Occupational Therapy   Occupational Therapy Initial Assessment/Discharge Summary  Date: 2022   Patient Name: Eddy Meléndez  MRN: [id number]     : 1960    Date of Service: 2022    Discharge Recommendations: Eddy Meléndez scored a 24/24 on the AM-PAC ADL Inpatient form. At this time, no further OT is recommended upon discharge due to patient being at baseline level of function. Recommend patient returns to prior setting with prior services. OT Equipment Recommendations  Equipment Needed: Yes  Mobility Devices: ADL Assistive Devices  ADL Assistive Devices: Shower Chair with back    Assessment   Assessment: Pt presents near baseline level of function. Anticipate safe to return home with assist of family members if needed. No further OT needed at this time. Decision Making: Low Complexity  Exam: as seen above  Assistance / Modification: Fxl mobility and ADLs Independent  OT Education: OT Role;Plan of Care  Patient Education: eval/discharge - pt v/u  REQUIRES OT FOLLOW UP: No  Activity Tolerance  Activity Tolerance: Patient Tolerated treatment well  Activity Tolerance: Pt felt nauseaus and slightly shaky after fxl mobility. Pt /63 and SpO2 92% recovered to 97% within 1 minute of sitting down. Safety Devices  Safety Devices in place: Yes  Type of devices: All fall risk precautions in place; Left in chair;Nurse notified;Call light within reach           Patient Diagnosis(es): The encounter diagnosis was Suspected COVID-19 virus infection. has a past medical history of Abnormal screening mammogram, Anxiety, Arthritis, Asthma, Cholelithiases, Colon polyps, Depression, Headache, History of abnormal mammogram, Hx of adenomatous colonic polyps, MDD (recurrent major depressive disorder) in remission ([facility] [address] Multinodular thyroid, Oral phase dysphagia, and Other osteoporosis without current pathological fracture. has a past surgical history that includes Tonsillectomy and Tubal ligation. Restrictions  Restrictions/Precautions  Restrictions/Precautions: Isolation,Fall Risk (Moderate fall risk, droplet plus isolation)  Required Braces or Orthoses?: No  Position Activity Restriction  Other position/activity restrictions: 57 y.o. female who was exposed to family members with Covid 2 weeks ago. Patient started to develop sore throat roughly 1 week ago had a runny nose for 1 day headache. No tendinitis no nausea vomiting or diarrhea no chest pain. Patient developed low-grade fevers yesterday and a fever of 102 today and has been having diffuse body myalgias. Patient tested negative for Covid PCR on Friday home test on Sunday and then rapid antigen at urgent care again today. Patient is vaccinated with 2 doses of maternal vaccine no other sick contacts other than daughter and son-in-law who had Covid 2 weeks ago. Subjective   General  Chart Reviewed: Yes  Family / Caregiver Present: No  Diagnosis: Sepsis  Subjective  Subjective: Pt in bed upon arrival. Pleasant and agreeable to therapy. Denies pain     Social/Functional History  Social/Functional History  Lives With: Family  Type of Home: House  Home Layout: Two level  Home Access: Stairs to enter with rails  Entrance Stairs - Number of Steps: 10  Entrance Stairs - Rails: Both  Bathroom Shower/Tub: Tub/Shower unit  Bathroom Toilet: Standard  Bathroom Equipment: Grab bars in shower,Hand-held shower,Grab bars around toilet  Bathroom Accessibility: Not accessible  Receives Help From: Family  ADL Assistance: Independent  Homemaking Assistance: Independent  Homemaking Responsibilities: Yes  Ambulation Assistance: Independent  Transfer Assistance: Independent  Active : Yes  Mode of Transportation: Car  Additional Comments: No falls in the past 6 months.        Objective   Vision: Impaired  Vision Exceptions: Wears glasses for reading  Hearing: Within functional limits    Orientation  Overall Orientation Status: Within Functional [FreeTextEntry1] : Mr. Lipscomb presents for follow up and management of HIV, HTN, dyslipidemia, aortic regurgitation, and a dilated ascending aorta (4.7cm). His primary care provider, Arsenio Street NP, noted a murmur on cardiac auscultation. He was subsequently referred for an echocardiogram at [facility] on 03/22/17 which revealed an EF of 60%, a mildly dilated left atrium, mild-to-moderate aortic regurgitation, and a dilated ascending aorta measuring 4.3cm. He is very physically fit and exercises daily. He has resting bradycardia as a result of his cardiovascular fitness level. We had an extensive discussion about the results of the echocardiogram and the clinical importance of the dilated ascending aorta. I referred him to a cardiothoracic surgeon, Bc Banegas MD, who specialized in thoracic aortic surgery. He had a CT aortogram on 04/17/17 which revealed a dilated aortic root and ascending aorta measuring 4.7cm at sinuses of Valsalva and a repeat CTA on 12/04/17 which was unchanged.  He had complaints of chest pain and underwent an exercise stress echocardiogram on 12/13/17 which revealed an EF of 62%, 14.8 METS, normal wall motion and PA pressures, aortic root 4.2cm, and mild AI.  He was not able to tolerate low-dose Toprol XL secondary to an episode of weakness and near syncope. He had an echocardiogram on 05/01/18 which revealed an EF of 65%, dilated LA, aortic root 4.2cm, and mild-moderate AI.  He had a cardiac CT on 05/24/19 which revealed aortic root 4.7cm, ascending aorta 4.2cm, and moderate LA calcifications. His HIV specialist may want to alter his ART regimen to help prevent the negative lipid effects.  He had a CTA chest on 05/24/19 which revealed an aortic root of 4.7cm, ascending aorta 4.2cm, moderate LA calcifications. He had an exercise stress echocardiogram on 08/08/19 which revealed an EF 62%, dilated aortic root 4.3cm, dilated prox ascending aorta 4.6cm, mild AI, normal wall motion, PA pressures, and ECG post 13.9 METS.   Since his last visit, he saw Hussein Jenkins MD and was enrolled in the thoracic aortic aneurysm surveillance center with a recommendation for repeat CTA in 1 year.  He has complaints of insomnia but does admit to taking a nap daily.  Limits  Observation/Palpation  Posture: Good  Balance  Sitting Balance: Independent  Standing Balance: Independent  Standing Balance  Time: ~2 minutes  Activity: Fxl mobility  Functional Mobility  Functional - Mobility Device: No device  Activity: To/from bathroom  Assist Level: Independent  Functional Mobility Comments: 22' with one seated rest break at toilet  Toilet Transfers  Toilet - Technique: Ambulating  Equipment Used: Standard toilet  Toilet Transfer: Independent  ADL  Additional Comments: Pt politely declined all ADLs  Tone RUE  RUE Tone: Normotonic  Tone LUE  LUE Tone: Normotonic  Coordination  Movements Are Fluid And Coordinated: Yes     Bed mobility  Supine to Sit: Independent  Scooting: Independent  Transfers  Sit to stand: Independent (2x; EOB & toilet)  Stand to sit: Independent (2x; toilet & recliner)     Cognition  Overall Cognitive Status: WNL  Perception  Overall Perceptual Status: WFL     Sensation  Overall Sensation Status: WFL        LUE AROM (degrees)  LUE AROM : WFL  Left Hand AROM (degrees)  Left Hand AROM: WFL  RUE AROM (degrees)  RUE AROM : WFL  Right Hand AROM (degrees)  Right Hand AROM: WFL         Plan   Plan  Times per week: d/c         AM-PAC Score        AM-[facility] Inpatient Daily Activity Raw Score: 24 (02/09/22 1050)  AM-PAC Inpatient ADL T-Scale Score : 57.54 (02/09/22 1050)  ADL Inpatient CMS 0-100% Score: 0 (02/09/22 1050)  ADL Inpatient CMS G-Code Modifier : [address] (02/09/22 1050)    Goals  Short term goals  Time Frame for Short term goals: d/c       Therapy Time   Individual Concurrent Group Co-treatment   Time In 0939         Time Out 1010         Minutes 31           Timed Code Treatment Minutes:  16 [id number] minutes    Total Treatment Minutes:  31 minutes     BRANDI Pop/WENDIE    OT provided direct supervision to student, facilitated in making skilled judgements throughout duration of session.     BRITTANY Curtis OTR/L [id number]     Curry Gama OT

## 2022-02-09 NOTE — PROGRESS NOTES
Physical Therapy    Facility/Department: [address]  Initial Assessment    NAME: Brett Gonzalez  : 1960  MRN: [id number]    Date of Service: 2022    Discharge Recommendations:Natasha Flannery scored a 24/ on the AM-PAC short mobility form. At this time, no further PT is recommended upon discharge due to patient at or near baseline function. Recommend patient returns to prior setting with prior services. PT Equipment Recommendations  Equipment Needed: No    Assessment   Body structures, Functions, Activity limitations: Decreased endurance  Assessment: Patient demonstrated a good understanding of limitations and independence with ambulation, transfers, and bed mobility. Slight SOB noted with mobility on RA however pt able to maintain appropriate sats throughout session. Patient is near baseline functional status and does not require skilled PT services at this time. Treatment Diagnosis: Decreased endurance  Prognosis: Excellent  Decision Making: Low Complexity  Clinical Presentation: Stable  PT Education: PT BEJD;SHEGZW Conservation;General Safety (d/c recommendations)  Patient Education: Patient verbalized understanding. Barriers to Learning: None  REQUIRES PT FOLLOW UP: No  Activity Tolerance  Activity Tolerance: Patient Tolerated treatment well  Activity Tolerance: Patient reported moderate level of fatigue at the beginning of session. After ambulation, patient displayed tremors and reported nausea. Patient was offered a snack but she declined. Patient Diagnosis(es): The encounter diagnosis was Suspected COVID-19 virus infection.      has a past medical history of Abnormal screening mammogram, Anxiety, Arthritis, Asthma, Cholelithiases, Colon polyps, Depression, Headache, History of abnormal mammogram, Hx of adenomatous colonic polyps, MDD (recurrent major depressive disorder) in remission ([facility] [address] Multinodular thyroid, Oral phase dysphagia, and Other osteoporosis without current pathological 1057)  Mobility Inpatient CMS 0-100% Score: 0 (02/09/22 1057)  Mobility Inpatient CMS G-Code Modifier : [address] (02/09/22 1057)     Patient Goals  No goals, discharge from acute PT caseload    Therapy Time   Individual Concurrent Group Co-treatment   Time In 0939         Time Out 1010         Minutes 31         Timed Code Treatment Minutes: 16 Minutes    Timed Code Treatment Minutes:  16 Minutes    Total Treatment Minutes:  [address], [facility]    PT providing direct supervision during session and assisting in making skilled judgements throughout session.   [id number] [place] [address] PT, DPT [id number]    [address] [place] [address], PT

## 2022-02-09 NOTE — PROGRESS NOTES
Shift assessment completed. A&Ox4. VSS. Patient in SR on the monitor, denies any pain. Daily medications given per MAR. Call light within reach. See flowsheets for further details.

## 2022-02-09 NOTE — PROGRESS NOTES
Patient assessed for readiness for discharge. Patient agreeable with discharge and notified her daughter.

## 2022-02-09 NOTE — DISCHARGE SUMMARY
Hospital Discharge Summary    Patient's PCP: Miguel Garcia MD  Admit Date: 2/7/2022   Discharge Date: 2/9/2022    Admitting Physician: Dr. Adelina Lamb MD  Discharge Physician: Dr. Adelina Lamb MD     Consults:   IP CONSULT TO HOSPITALIST  IP CONSULT TO PHARMACY    Brief HPI:   Gayathri Quezada is a 57 y.o. female who was exposed to family members with Covid 2 weeks ago. Patient started to develop sore throat roughly 1 week ago had a runny nose for 1 day headache. No tendinitis no nausea vomiting or diarrhea no chest pain. Patient developed low-grade fevers yesterday and a fever of 102 today and has been having diffuse body myalgias. Patient tested negative for Covid PCR on Friday home test on Sunday and then rapid antigen at urgent care again today. Patient is vaccinated with 2 doses of maternal vaccine no other sick contacts other than daughter and son-in-law who had Covid 2 weeks ago.       Brief hospital course:   Covid 23 infection  -Vaccinated, no booster  -Continue fluids and supportive care, no indication for steroids at this time as patient is not hypoxic        Centrilobular emphysema  -Not in acute exacerbation, continue inhalers           Dehydration  -Resolved with IV fluids        Sepsis was suspected but likely SIRS present on admission (hypotension, lactic acidosis, leukopenia)  -Suspect secondary to COVID and dehydration, cultures pending  -No indication for antibiotics at this time           Electrolyte abnormality  -Resolved with replacement and fluids, continue to monitor        Familial hypercholesterolemia  -Continue statin        Tobacco dependence  -NicoDerm, encouraged and advised to quit        Underweight      Patient did receive monoclonal antibodies on 2/8/2021, patient advised to quit smoking and improved with IV fluids, stable for discharge home, advised to follow-up with PCP in 1 week. Discharge Diagnoses:    Active Problems:    Centrilobular emphysema ([facility] [address]    Tobacco dependence    Familial hypercholesterolemia    Sepsis ([facility] [address]    COVID-19    Underweight    Dehydration  Resolved Problems:    * No resolved hospital problems. *      Physical Exam: /64   Pulse 56   Temp 98.5 °F (36.9 °C) (Temporal)   Resp 19   Ht 5' 4\" (1.626 m)   Wt 107 lb 12.8 oz (48.9 kg)   SpO2 97%   BMI 18.50 kg/m²   Gen/overall appearance: Not in acute distress. Alert. Frail/cachectic, appears chronically ill  Head: Normocephalic, atraumatic  Eyes: EOMI, good acuity  ENT:- Oral mucosa moist  Neck: No JVD, thyromegaly  CVS: Nml S1S2, no MRG, RRR  Pulm: Nearly absent breath sounds bilaterally. No crackles/wheezes  Gastrointestinal: Soft, NT/ND, +BS  Musculoskeletal: No edema. Warm  Neuro: No focal deficit. Moves extremity spontaneously. Psychiatry: Appropriate affect. Not agitated. Skin: Warm, dry with normal turgor. No rash        Significant diagnostic studies that may require follow up:  XR CHEST PORTABLE    Result Date: 2/7/2022  EXAMINATION: ONE XRAY VIEW OF THE CHEST 2/7/2022 1:30 pm COMPARISON: 05/06/2017 HISTORY: ORDERING SYSTEM PROVIDED HISTORY: Eval for infiltrate TECHNOLOGIST PROVIDED HISTORY: Reason for exam:->Eval for infiltrate FINDINGS: The lungs are without acute focal process. There is no effusion or pneumothorax. The cardiomediastinal silhouette is stable. The osseous structures are stable. No acute process. CT CHEST PULMONARY EMBOLISM W CONTRAST    Result Date: 2/7/2022  EXAMINATION: CTA OF THE CHEST 2/7/2022 4:32 pm TECHNIQUE: CTA of the chest was performed after the administration of intravenous contrast.  Multiplanar reformatted images are provided for review. MIP images are provided for review. Dose modulation, iterative reconstruction, and/or weight based adjustment of the mA/kV was utilized to reduce the radiation dose to as low as reasonably achievable.  COMPARISON: 06/26/2021 CTA PA 02/19/2021, LDCT HISTORY: [facility] HISTORY: R/o PE TECHNOLOGIST PROVIDED HISTORY: Reason for exam:->R/o PE Decision Support Exception - unselect if not a suspected or confirmed emergency medical condition->Emergency Medical Condition (MA) Reason for Exam: r/o pe FINDINGS: Pulmonary Arteries: Pulmonary arteries are adequately opacified for evaluation. No evidence of intraluminal filling defect to suggest pulmonary embolism. Main pulmonary artery is normal in caliber. Mediastinum: Thoracic aorta has a mild amount of plaque. There is no aneurysm. No pericardial effusion. No mediastinal or hilar adenopathy. Lungs/pleura: There is centrilobular emphysema. In the right upper lobe, along the minor fissure, there is a 6 x 4 mm nodule (image 56). This is not significantly changed. Old granulomatous disease is noted as well. There is no consolidation or pleural effusion. Upper Abdomen: The adrenal glands are unremarkable. A hyperdensity is noted in the gallbladder. Soft Tissues/Bones: No acute bone or soft tissue abnormality. No pulmonary embolus is identified. Stable 6 mm nodule in the right upper lobe. Continued surveillance by LDCT is recommended, with follow-up LDCT in 12 months. Centrilobular emphysema.      VL Extremity Venous Bilateral    Result Date: 2/8/2022  Vascular Lower Extremities DVT Study Procedure -- PRELIMINARY SONOGRAPHER REPORT --   Demographics   Patient Name       Jamie Dyson   Date of Study      02/08/2022         Gender              Female   Patient Number     [id number]         Date of Birth       1960   Visit Number       [id number]          Age                 57 year(s)   Accession Number   [id number]         Room Number         6182   Corporate ID       [id number]            Sonographer         Twan Garcia,                                                            [address]   Ordering Physician Giuliano Robles, Interpreting        [facility]                     MD                 Physician  Procedure Type of Study:   Veins:Lower Extremities DVT Study, VASC EXTREMITY VENOUS DUPLEX BILATERAL. Tech Comments Right No evidence of deep vein or superficial vein thrombosis involving the right lower extremity. Left No evidence of deep vein or superficial vein thrombosis involving the left lower extremity. Treatments: As above. Discharge Medications:     Medication List      START taking these medications    guaiFENesin 600 MG extended release tablet  Commonly known as: MUCINEX  Take 1 tablet by mouth 2 times daily for 15 days     nicotine 21 MG/24HR  Commonly known as: [address] [facility] 1 patch onto the skin daily  Start taking on: February 10, 2022        CONTINUE taking these medications    * albuterol (2.5 MG/3ML) 0.083% nebulizer solution  Commonly known as: PROVENTIL  Take 3 mLs by nebulization every 4 hours as needed for Wheezing     * albuterol sulfate  (90 Base) MCG/ACT inhaler  Commonly known as: Ventolin HFA  Inhale 2 puffs into the lungs 4 times daily     aspirin 81 MG chewable tablet  Commonly known as: Aspirin Childrens  Take 1 tablet by mouth daily     atorvastatin 80 MG tablet  Commonly known as: LIPITOR  TAKE ONE TABLET BY MOUTH DAILY     Breo Ellipta 200-25 MCG/INH Aepb inhaler  Generic drug: Fluticasone furoate-vilanterol  Inhale 1 puff into the lungs daily One puff daily     calcium gluconate 500 MG tablet  Take 1 tablet by mouth daily     ezetimibe 10 MG tablet  Commonly known as: Zetia  Take 1 tablet by mouth daily In addition to atorvastatin for cholesterol     tiotropium 2.5 MCG/ACT Aers inhaler  Commonly known as: Spiriva Respimat  INHALE 2 PUFFS INTO THE LUNGS EVERY DAY     vitamin D 1.25 MG (98152 UT) Caps capsule  Commonly known as: ERGOCALCIFEROL  1 po q 2 weeks         * This list has 2 medication(s) that are the same as other medications prescribed for you. Read the directions carefully, and ask your doctor or other care provider to review them with you.             STOP taking these medications    metoprolol succinate 25 MG extended release tablet  Commonly known as: TOPROL XL           Where to Get Your Medications      These medications were sent to [address] [place] [address] [address]  [address] [place], [address]    Phone: [phone]   · guaiFENesin 600 MG extended release tablet  · nicotine 21 MG/24HR         Activity: activity as tolerated  Diet: ADULT DIET; Regular      Disposition: home  Discharged Condition: Stable  Follow Up:   No follow-up provider specified. Code status:  Full Code         Total time spent on discharge, finalizing medications, referrals and arranging outpatient follow up was more than 45 minutes      Thank you Dr. Lonne Olszewski, MD for the opportunity to be involved in this patients care.

## 2022-02-10 ENCOUNTER — VIRTUAL VISIT (OUTPATIENT)
Dept: FAMILY MEDICINE CLINIC | Age: 62
End: 2022-02-10
Payer: MEDICARE

## 2022-02-10 ENCOUNTER — CARE COORDINATION (OUTPATIENT)
Dept: CASE MANAGEMENT | Age: 62
End: 2022-02-10

## 2022-02-10 ENCOUNTER — TELEPHONE (OUTPATIENT)
Dept: FAMILY MEDICINE CLINIC | Age: 62
End: 2022-02-10

## 2022-02-10 DIAGNOSIS — U07.1 COVID: Primary | ICD-10-CM

## 2022-02-10 DIAGNOSIS — J43.2 CENTRILOBULAR EMPHYSEMA (HCC): ICD-10-CM

## 2022-02-10 DIAGNOSIS — U07.1 COVID-19: Primary | ICD-10-CM

## 2022-02-10 PROCEDURE — 3017F COLORECTAL CA SCREEN DOC REV: CPT | Performed by: INTERNAL MEDICINE

## 2022-02-10 PROCEDURE — 1111F DSCHRG MED/CURRENT MED MERGE: CPT | Performed by: INTERNAL MEDICINE

## 2022-02-10 PROCEDURE — G8427 DOCREV CUR MEDS BY ELIG CLIN: HCPCS | Performed by: INTERNAL MEDICINE

## 2022-02-10 PROCEDURE — 99213 OFFICE O/P EST LOW 20 MIN: CPT | Performed by: INTERNAL MEDICINE

## 2022-02-10 NOTE — CARE COORDINATION
El 45 Transitions Initial Follow Up Call:    Patient will follow up with PCP 22. Patient reports some SOB and cough. She denies fever and is not using O2. Her Os saturations have been in the 90's. She does report BP 91/59. CTN encouraged her to notify Dr. Anabel Jimenez of BP readings, patient has been keeping a log. CTN will also route message to Dr. Anabel Jimenez. Discussed discharge instructions and reviewed medications, 1111F completed. CTN will continue with outreach follow up calls. Call within 2 business days of discharge: Yes    Patient: Yadiel Bailon Patient : 1960   MRN: [id number]  Reason for Admission: [facility]  Discharge Date: 22 RARS: Readmission Risk Score: 11 ( )      Last Discharge [facility]       Complaint Diagnosis Description Type Department Provider    22 Concern For COVID-19 Suspected COVID-19 virus infection ED to Hosp-Admission (Discharged) (ADMITTED) Shanta Messer MD; Herminio Buckner. .. Spoke with: Yadiel Bailon      Transitions of Care Initial Call    Was this an external facility discharge? No Discharge Facility:     Challenges to be reviewed by the provider   Additional needs identified to be addressed with provider: Yes  Blood pressure concerns             Method of communication with provider : chart routing      Advance Care Planning:   Does patient have an Advance Directive: not on file; education provided. Was this a readmission? No  Patient stated reason for admission: COVID  Patients top risk factors for readmission: medical condition-.    Care Transition Nurse (CTN) contacted the patient by telephone to perform post hospital discharge assessment. Verified name and  with patient as identifiers. Provided introduction to self, and explanation of the CTN role. CTN reviewed discharge instructions, medical action plan and red flags with patient who verbalized understanding.  Patient given an opportunity to ask questions and does not have any further questions or concerns at this time. Were discharge instructions available to patient? Yes. Reviewed appropriate site of care based on symptoms and resources available to patient including: PCP, Urgent care clinics and When to call 911. The patient agrees to contact the PCP office for questions related to their healthcare. Medication reconciliation was performed with patient, who verbalizes understanding of administration of home medications. Advised obtaining a 90-day supply of all daily and as-needed medications. Covid Risk Education     Educated patient about risk for severe COVID-19 due to risk factors according to CDC guidelines. CTN reviewed discharge instructions, medical action plan and red flag symptoms with the patient who verbalized understanding. Discussed COVID vaccination status: Yes. Education provided on COVID-19 vaccination as appropriate. Discussed exposure protocols and quarantine with CDC Guidelines. Patient was given an opportunity to verbalize any questions and concerns and agrees to contact CTN or health care provider for questions related to their healthcare. Reviewed and educated patient on any new and changed medications related to discharge diagnosis. Was patient discharged with a pulse oximeter? Yes Discussed and confirmed pulse oximeter discharge instructions and when to notify provider or seek emergency care. CTN provided contact information. Plan for follow-up call in 1-2 days based on severity of symptoms and risk factors. Plan for next call: symptom management-.  self management-.  follow up appointment-.               Non-face-to-face services provided:  Obtained and reviewed discharge summary and/or continuity of care documents    Care Transitions 24 Hour Call    Do you have any ongoing symptoms?: Yes  Patient-reported symptoms: Other (Comment: Hypotension)  Interventions for patient-reported symptoms: Notified PCP/Physician (Comment: Patient will call MD, CTN will route message.)  Do you have a copy of your discharge instructions?: Yes  Do you have all of your prescriptions and are they filled?: Yes  Have you been contacted by a [address]?: No  Have you scheduled your follow up appointment?: Yes  How are you going to get to your appointment?: Car - family or friend to transport  Were you discharged with any Home Care or Post Acute Services: No  Do you feel like you have everything you need to keep you well at home?: Yes  Care Transitions Interventions         Follow Up  Future Appointments   Date Time Provider Royce Balderas   2/17/2022  9:15 AM Dorcas Dorantes MD [facility]   2/24/2022  8:40 AM MD PORTIA Sebastian  [place] - DYD   6/21/2022  8:00 AM Anurag Luna  [facility]   8/1/2022  8:00 AM MD Ananya Sebastian [address] - DYD       Shena Smith RN

## 2022-02-10 NOTE — TELEPHONE ENCOUNTER
pts daughter is asking for you to call her. Pt is asking for a call back. States that her BP is fluctuating up and down. States that it was doing it a bit while she was admitted in the hospital and it has continued since she got back home. The hospital contributed it to covid but she is still concerned. 91/59 this was after up and moving around. 104/61 after siting for a while and calm.

## 2022-02-10 NOTE — PROGRESS NOTES
NOTE - this visit conducted via telephone  Visit initiated at patient or caregiver request with permission to bill to insurer granted. Consent:  He and/or health care decision maker is aware that that he may receive a bill for this telephone service, depending on his insurance coverage, and has provided verbal consent to proceed: Yes      Documentation:  I communicated with the patient and/or health care decision maker about see below. Details of this discussion including any medical advice provided: see below      I affirm this is a Patient Initiated Episode with a Patient who has not had a related appointment within my department in the past 7 days or scheduled within the next 24 hours. Patient identification was verified at the start of the visit: YesTotal Time: minutes: 11-20 minutes    Note: not billable if this call serves to triage the patient into an appointment for the relevant concern      HPI: Irina Bruno presents for follow-up hospitalization COVID. Health issues include grief, emphysema, insomnia, adenomatous polyp with recheck due in 2020, hyperlipidemia, goiter, tobacco addiction,  osteoporosis, asymptomatic gallstones. Family members with Covid. She noted the onset of some sinus congestion. 2 - Covid testing at home. Went to urgent care February 7 quick test negative was given a prescription for prednisone and doxycycline. Worsening symptoms prompted ER visit on February 7 with positive Covid test, lactic acidosis, Covid pneumonia on chest x-ray. No hypoxemia. She was given nebulizers. No antibiotics or infusions. Only low magnesium. Negative blood cultures. CTA negative PE thoracic atherosclerosis, positive emphysema stable pulmonary nodule possible gallstone. Discharged yesterday. Is concerned about blood pressure running in the 80s to 90s some lightheadedness when she gets up. Is drinking eating making urine. Baseline BMI is 18.   Using her maintenance inhalers albuterol 4 times a day does have a nebulizer which she is used infrequently. No fevers. Saturations greater than 90. Heart rate 8290. Feels fatigued and lightheaded with ambulation. No falls. Daughter is with her. They have an oximeter at home tobacco for 4 days     . Underweight. History of depression. Positive tobacco.  Colon polyps. No current GE reflux. Taking Ensure in the morning. Weight has been stable. No vomiting. No diarrhea.     Atypical chest pain. Abnormal thallium with heart catheterization 8/2021 nonobstructive coronary disease. Inge Rued Positive aspirin, metoprolol, atorvastatin 80, Zetia 10.     Severe emphysema FEV1 of 1.4 in 2017.  Has cut back to tobacco about 1 pack a week from 4 packs a day.  Positive Breo and Spiriva. ER nebulizer. Rinses her mouth. Bring it being well today. . [address] had her Covid vaccine #2.  Remote unprovoked PE.  CTA stable 2021 with multi lobar emphysema.  Follows with pulmonary      Review of CT scan shows gallstones. Asymptomatic.     Dysphagia. .  History Heimlich maneuver with pill and intermittent choking. .  Multinodular goiter.  Positive tobacco.  Modified barium swallow without aspiration.  Recommended sitting upright with meals and taking meds with purée or liquid.  Has had no more issues. Euthyroid.     Adenomatous polyp 2017 and negative upper endoscopy for sprue.  Consider bacterial overgrowth with H2 breath test.  Follows with Dr. Se Ma.  Overdue repeat colonoscopy and aware going to call to reschedule. No diarrhea or change in stools     Osteoporosis.  Positive vitamin D supplementation.  Reclast injection June 2020 without adverse effects.  DEXA scan February 2020 with T score of -3.8 and lumbar -3 in the right and left femoral neck.  Normal PTH, thyroid and vitamin D.neg sprue.  Positive caffeine, tobacco, low BMI. Difficult tobacco history. Positive sodas 5 daily. .  Following with rheumatology.  Positive osteoarthritis.  No longer using nonsteroidal anti-inflammatories.   Recheck bone density due this year and injection in September     Chronic headaches significantly improved.  MRI with microvascular changes only.  No longer using daily Motrin. .          41 years taken off ventilator 2017.  + grief persist.  Doing her new puppy     Osteoporosis. Vitamin D supplement Reclast.  Tobacco down from 5 packs a day to 1. Positive caffeine. Underweight. Osteoarthritis chronic pain. Tylenol      Hyperlipidemia  down with atorvastatin and zetia  No liver function abnormality.  No claudication or TIA symptoms.    No cardiac testing CTA with coronary calcifications.    No obstructive coronary disease on catheterization . LDL      Multinodular goiter.  Euthyroid.  No palpitations or stridor. Dysphagia doing better.     OA hips and back      .        vaginal delivery without complication. No gestational diabetes or hypertension. Tubal ligation. Early menopause. Denies change in visual acuity cotesting 2017 was normal.  Sister with breast. cancer.  Mammogram BI-RADS 2021. Does exams. No current concern     PMH:    Childbirth     PE  20's     pneumonia     Tubal      Coronary catheterization nonobstructive disease      SH: moved into home with daughter and    + tobacco 4 packs daily down to 1 weekly. Sidonie Majano alcohol.  No recreational drugs.  Retired minimal exercise. .   2017 after prolonged illness. No partners.  [facility] 3 brother 3 sisters    + breast cancer in sister 2 [facility]     -colon, ovarian cancer     ROS: Decreased vision.    No concerns with memory.  No falls.  Edentulous.  Chronic shortness of breath and wheeze.  Is with pulmonary postnasal drip.  Depression and anxiety with grief.    Insomnia proved. . Positive palpitations near syncope and chest discomfort.  No breast masses.  Occasional dyspepsia.  Colonic polyps recheck due . [address] urinary concerns.  Occasional arthralgias.  Low BMI stable.  Wears sunscreen now.  Safe at home  Constitutional, ent, CV, respiratory, GI, , joint, skin, allergic and psychiatric ROS reviewed and negative except for above    Allergies   Allergen Reactions    Pcn [Penicillins]        Outpatient Medications Marked as Taking for the 2/10/22 encounter (Virtual Visit) with Anand De Anda MD   Medication Sig Dispense Refill    nicotine (NICODERM CQ) 21 MG/24HR Place 1 patch onto the skin daily 30 patch 0    tiotropium (SPIRIVA RESPIMAT) 2.5 MCG/ACT AERS inhaler INHALE 2 PUFFS INTO THE LUNGS EVERY DAY 3 each 3    Fluticasone furoate-vilanterol (BREO ELLIPTA) 200-25 MCG/INH AEPB inhaler Inhale 1 puff into the lungs daily One puff daily 3 each 3    albuterol sulfate HFA (VENTOLIN HFA) 108 (90 Base) MCG/ACT inhaler Inhale 2 puffs into the lungs 4 times daily 3 each 3    aspirin (ASPIRIN CHILDRENS) 81 MG chewable tablet Take 1 tablet by mouth daily 30 tablet 3    atorvastatin (LIPITOR) 80 MG tablet TAKE ONE TABLET BY MOUTH DAILY 90 tablet 3    albuterol (PROVENTIL) (2.5 MG/3ML) 0.083% nebulizer solution Take 3 mLs by nebulization every 4 hours as needed for Wheezing 120 mL 5    vitamin D (ERGOCALCIFEROL) 1.25 MG (57923 UT) CAPS capsule 1 po q 2 weeks 6 capsule 3    calcium gluconate 500 MG tablet Take 1 tablet by mouth daily 30 tablet 5             Past Medical History:   Diagnosis Date    Abnormal screening mammogram 7/31/2017    Dx right pending    Anxiety     Arthritis     Asthma     Cholelithiases 3/17/2021    Colon polyps 10/30/2017    Multiple path pending 10.2017  Recheck 10.2020    Depression     Headache 8/21/2019    History of abnormal mammogram 7/31/2017    birads 3 right recheck 8.2018 9.2019 recheck 1 year 10.2020 birad 1    Hx of adenomatous colonic polyps 10/30/2017    adenoma 10.2017  Recheck 10.2020    MDD (recurrent major depressive disorder) in remission ([facility] [address] 3/11/2020    Multinodular thyroid 7/25/2017 7.2017 euthyroid recheck 1 year    Oral phase dysphagia 4/28/2021    Other osteoporosis without current pathological fracture 2/21/2020    2.2020  T-score of  -3.8 and a Z-score of -2.3.  spine; hip -3.0 and a Z-score of -1.7       Past Surgical History:   Procedure Laterality Date    TONSILLECTOMY      TUBAL LIGATION               Family History   Problem Relation Age of Onset    COPD Father     Breast Cancer Sister     Heart Attack Brother          Review of Systems      Chemistry        Component Value Date/Time     02/09/2022 0439    K 4.2 02/09/2022 0439     (H) 02/09/2022 0439    CO2 23 02/09/2022 0439    BUN 11 02/09/2022 0439    CREATININE 0.8 02/09/2022 0439        Component Value Date/Time    CALCIUM 7.9 (L) 02/09/2022 0439    ALKPHOS 75 02/09/2022 0439    AST 39 (H) 02/09/2022 0439    ALT 20 02/09/2022 0439    BILITOT <0.2 02/09/2022 0439            NO  VS as this was a virtual visit during cvod19 pandemic  She is appropriate. No tachypnea. No confusion. Talks without difficulty. Daughter is assisting and sometimes answers question for her however she is able to answer them on her own.     Lab Results   Component Value Date    WBC 4.4 02/09/2022    HGB 10.6 (L) 02/09/2022    HCT 31.5 (L) 02/09/2022    MCV 92.4 02/09/2022     02/09/2022     Lab Results   Component Value Date    LABA1C 5.4 07/20/2017     Lab Results   Component Value Date    .3 07/20/2017     Lab Results   Component Value Date    LABA1C 5.4 07/20/2017     No components found for: CHLPL  Lab Results   Component Value Date    TRIG 99 06/24/2021    TRIG 148 10/13/2020    TRIG 130 11/13/2019     Lab Results   Component Value Date    HDL 43 06/24/2021    HDL 49 10/13/2020    HDL 61 (H) 11/13/2019     Lab Results   Component Value Date    LDLCALC 64 06/24/2021    LDLCALC 155 (H) 10/13/2020    LDLCALC 172 (H) 11/13/2019     Lab Results   Component Value Date    LABVLDL 20 06/24/2021    LABVLDL 30 10/13/2020    LABVLDL 26 11/13/2019       Old labs and records reviewed or requested  Discussed past lab and studies with patient        Diagnosis Orders   1. COVID     2. Centrilobular emphysema ([address]       History lactic acidosis Covid requiring short-term hospitalization. I is improved. Eating drinking. Some lightheadedness and low blood pressure. No palpitations. Making good urine. States she is eating. Will start doing her nebulizer 4 times a day albuterol as needed if worsening symptoms saturations consistently less than 90 or systolics less than 85 will report back to the emergency room. Increase hydration. Fall precautions. Continue to monitor oxygen and blood pressure. We did not discuss gallstones hyperlipidemia goiter depression adenomatous polyps dysphagia arrhythmia osteoporosis or vitamin D deficiency today          Diagnosis and treatment discussed.   Possible side effects of medication reviewed  Patients questions answered  Follow up understood  Pt aware if they are not contacted about any test results , this does not mean they are normal.  They should call

## 2022-02-11 LAB
BLOOD CULTURE, ROUTINE: NORMAL
CULTURE, BLOOD 2: NORMAL

## 2022-02-15 ENCOUNTER — CARE COORDINATION (OUTPATIENT)
Dept: CASE MANAGEMENT | Age: 62
End: 2022-02-15

## 2022-02-15 NOTE — CARE COORDINATION
El 45 Transitions Follow Up Call    2/15/2022    Patient: Gayathri Quezada  Patient : 1960   MRN: [id number]  Reason for Admission: COVID  Discharge Date: 22 RARS: Readmission Risk Score: 11 ( )         Spoke with: Jas Quigley Transitions Subsequent and Final Call    Subsequent and Final Calls  Do you have any ongoing symptoms?: No  Have your medications changed?: No  Do you have any questions related to your medications?: No  Do you currently have any active services?: No  Do you have any needs or concerns that I can assist you with?: No  Identified Barriers: None  Care Transitions Interventions  Other Interventions: Follow Up: Patient reports that she is doing well, denies fever, cough, does report some SOB when she is ambulating long distances, O2 saturations have been around 95% and blood pressure has stabilized, not running so low according to patient. She will follow up with PCP 22. CTN will continue with outreach follow up calls.     Future Appointments   Date Time Provider Royce Balderas   2022  9:15 AM Henrietta Chavez MD [facility]   2022  8:40 AM MD PORTIA Woodson  Kita  PAU   2022  8:00 AM Didier Brewer DO [facility]   2022  8:00 AM MD PORTIA Woodson [facility] Manzanares RN

## 2022-02-17 ENCOUNTER — OFFICE VISIT (OUTPATIENT)
Dept: SURGERY | Age: 62
End: 2022-02-17
Payer: MEDICARE

## 2022-02-17 VITALS
SYSTOLIC BLOOD PRESSURE: 124 MMHG | BODY MASS INDEX: 17.85 KG/M2 | WEIGHT: 104 LBS | HEART RATE: 117 BPM | DIASTOLIC BLOOD PRESSURE: 76 MMHG

## 2022-02-17 DIAGNOSIS — L72.0 EPIDERMOID CYST OF NECK: Primary | ICD-10-CM

## 2022-02-17 PROCEDURE — G8419 CALC BMI OUT NRM PARAM NOF/U: HCPCS | Performed by: SURGERY

## 2022-02-17 PROCEDURE — G8427 DOCREV CUR MEDS BY ELIG CLIN: HCPCS | Performed by: SURGERY

## 2022-02-17 PROCEDURE — G8482 FLU IMMUNIZE ORDER/ADMIN: HCPCS | Performed by: SURGERY

## 2022-02-17 PROCEDURE — 99203 OFFICE O/P NEW LOW 30 MIN: CPT | Performed by: SURGERY

## 2022-02-17 NOTE — Clinical Note
Hi Dr. Edie Malhotra,    I just saw Ms. Sudhakar Rodas for her left neck sebaceous cyst.  I am going to excise it in the office in the near future. Thank you for allowing me to take care of Ms. Flannery with you.     Blayne Norton

## 2022-02-17 NOTE — PROGRESS NOTES
New Patient [address] General and Vascular Surgery   Angy Pink MD    [address] [facility], [address] [place] [address] Drive  Marsha Moe  Phone: [phone]  Fax: [phone]    Yadiel Bailon   YOB: 1960    Date of Visit:  2/17/2022    Janell Juan MD    HPI:   Sebaceous cyst: Yadiel Bailon presents for evaluation of a sebaceous cyst as a referral from Sue Haddad MD. Lesion is located in the left neck. Onset was 1 year ago. It has slowly enlarged and is much more noticeable on her neck. No significant pain. No drainage or redness. Never had any lumps/bumps removed in the past.    She is a current smoker.     Pain Score:   0 - No pain    Allergies   Allergen Reactions    Pcn [Penicillins]      Outpatient Medications Marked as Taking for the 2/17/22 encounter (Office Visit) with Lennox Shah MD   Medication Sig Dispense Refill    nicotine (NICODERM CQ) 21 MG/24HR Place 1 patch onto the skin daily 30 patch 0    guaiFENesin (MUCINEX) 600 MG extended release tablet Take 1 tablet by mouth 2 times daily for 15 days 30 tablet 0    tiotropium (SPIRIVA RESPIMAT) 2.5 MCG/ACT AERS inhaler INHALE 2 PUFFS INTO THE LUNGS EVERY DAY 3 each 3    Fluticasone furoate-vilanterol (BREO ELLIPTA) 200-25 MCG/INH AEPB inhaler Inhale 1 puff into the lungs daily One puff daily 3 each 3    albuterol sulfate HFA (VENTOLIN HFA) 108 (90 Base) MCG/ACT inhaler Inhale 2 puffs into the lungs 4 times daily 3 each 3    aspirin (ASPIRIN CHILDRENS) 81 MG chewable tablet Take 1 tablet by mouth daily 30 tablet 3    atorvastatin (LIPITOR) 80 MG tablet TAKE ONE TABLET BY MOUTH DAILY 90 tablet 3    albuterol (PROVENTIL) (2.5 MG/3ML) 0.083% nebulizer solution Take 3 mLs by nebulization every 4 hours as needed for Wheezing 120 mL 5    ezetimibe (ZETIA) 10 MG tablet Take 1 tablet by mouth daily In addition to atorvastatin for cholesterol 90 tablet 0    vitamin D (ERGOCALCIFEROL) 1.25 MG (12977 UT) CAPS capsule 1 po q 2 weeks 6 capsule 3    calcium gluconate 500 MG tablet Take 1 tablet by mouth daily 30 tablet 5       Past Medical History:   Diagnosis Date    Abnormal screening mammogram 7/31/2017    Dx right pending    Anxiety     Arthritis     Asthma     Cholelithiases 3/17/2021    Colon polyps 10/30/2017    Multiple path pending 10.2017  Recheck 10.2020    Depression     Headache 8/21/2019    History of abnormal mammogram 7/31/2017    birads 3 right recheck 8.2018 9.2019 recheck 1 year 10.2020 birad 1    Hx of adenomatous colonic polyps 10/30/2017    adenoma 10.2017  Recheck 10.2020    MDD (recurrent major depressive disorder) in remission ([facility] [address] 3/11/2020    Multinodular thyroid 7/25/2017 7.2017 euthyroid recheck 1 year    Oral phase dysphagia 4/28/2021    Other osteoporosis without current pathological fracture 2/21/2020 2.2020  T-score of  -3.8 and a Z-score of -2.3.  spine; hip -3.0 and a Z-score of -1.7     Past Surgical History:   Procedure Laterality Date    TONSILLECTOMY      TUBAL LIGATION       Family History   Problem Relation Age of Onset    COPD Father     Breast Cancer Sister     Heart Attack Brother      Social History     Socioeconomic History    Marital status:       Spouse name: Not on file    Number of children: Not on file    Years of education: Not on file    Highest education level: Not on file   Occupational History    Not on file   Tobacco Use    Smoking status: Current Every Day Smoker     Packs/day: 1.00     Years: 50.00     Pack years: 50.00     Types: Cigarettes    Smokeless tobacco: Never Used    Tobacco comment: cutting back now at 1/2 pack per day   Vaping Use    Vaping Use: Never used   Substance and Sexual Activity    Alcohol use: No    Drug use: No    Sexual activity: Yes     Partners: Male   Other Topics Concern    Not on file   Social History Narrative    Not on file     Social Determinants of Health     Financial Resource Strain: Low Risk     Difficulty of Paying Living Expenses: Not hard at all   Food Insecurity: No Food Insecurity    Worried About Running Out of Food in the Last Year: Never true    Felisha of Food in the Last Year: Never true   Transportation Needs:     Lack of Transportation (Medical): Not on file    Lack of Transportation (Non-Medical): Not on file   Physical Activity:     Days of Exercise per Week: Not on file    Minutes of Exercise per Session: Not on file   Stress:     Feeling of Stress : Not on file   Social Connections:     Frequency of Communication with Friends and Family: Not on file    Frequency of Social Gatherings with Friends and Family: Not on file    Attends Jainism Services: Not on file    Active Member of [address] [place] [address] [employer] or Organizations: Not on file    Attends Club or Organization Meetings: Not on file    Marital Status: Not on file   Intimate Partner Violence:     Fear of Current or Ex-Partner: Not on file    Emotionally Abused: Not on file    Physically Abused: Not on file    Sexually Abused: Not on file   Housing Stability:     Unable to Pay for Housing in the Last Year: Not on file    Number of [place] in the Last Year: Not on file    Unstable Housing in the Last Year: Not on file          Vitals:    02/17/22 0913   BP: 124/76   Pulse: 117   Weight: 104 lb (47.2 kg)     Body mass index is 17.85 kg/m².      Wt Readings from Last 3 Encounters:   02/17/22 104 lb (47.2 kg)   02/09/22 107 lb 12.8 oz (48.9 kg)   01/31/22 107 lb (48.5 kg)     BP Readings from Last 3 Encounters:   02/17/22 124/76   02/09/22 113/63   01/31/22 102/67          REVIEW OF SYSTEMS:   Pertinent positives are in HPI, otherwise all systems reviewed and negative    PHYSICAL EXAM:    CONSTITUTIONAL:  awake, alert, no apparent distress and thin  ENT:  normocephalic, without obvious abnormality  NECK:  supple, symmetrical, trachea midline   LUNGS:  Resp easy and unlabored  CARDIOVASCULAR:  regular rate and rhythm  MUSCULOSKELETAL: No edema  NEUROLOGIC:  Mental Status Exam:  Level of Alertness:   awake  Orientation:   person, place, time  SKIN: left neck mobile 1cm cutaneous mass with central punctum and no erythema or induration, mobile        ASSESSMENT:     Diagnosis Orders   1. Epidermoid cyst of neck           PLAN:    Ms. Abdirizak Rodriguez has a sebaceous cyst of the left neck. As it is enlarging and becoming noticeable, I recommended excision in the office. The risks, benefits, and alternatives were discussed with the patient and she is willing to consent for the procedure. Will plan for procedure over next week or two.     Electronically signed by Yue Bateman MD on 2/17/2022 at 9:34 AM

## 2022-02-23 ENCOUNTER — CARE COORDINATION (OUTPATIENT)
Dept: CASE MANAGEMENT | Age: 62
End: 2022-02-23

## 2022-02-23 NOTE — CARE COORDINATION
[address] Transitions Follow Up Call    2022    Patient: Farnaz Valencia  Patient : 1960   MRN: [id number]  Reason for Admission: Covid  Discharge Date: 22 RARS: Readmission Risk Score: 11 ( )         Spoke with: Jaja Alma Transitions Follow Up Call    Needs to be reviewed by the provider   Additional needs identified to be addressed with provider: No  none             Method of communication with provider : none      Care Transition Nurse (CTN) contacted the patient by telephone to follow up after admission on 22. Verified name and  with patient as identifiers. Addressed changes since last contact: Appt with Dr. Jennifer Garcia  Discussed follow-up appointments. If no appointment was previously scheduled, appointment scheduling offered: Yes. Is follow up appointment scheduled within 7 days of discharge? Yes. Advance Care Planning:   Does patient have an Advance Directive: Not on file    CTN reviewed discharge instructions, medical action plan and red flags with patient and discussed any barriers to care and/or understanding of plan of care after discharge. Discussed appropriate site of care based on symptoms and resources available to patient including: PCP, Specialist, Urgent care clinics and When to call 911. The patient agrees to contact the PCP office for questions related to their healthcare. Patients top risk factors for readmission: ineffective coping  Interventions to address risk factors: Obtained and reviewed discharge summary and/or continuity of care documents      Non-[facility] follow up appointment(s):     CTN provided contact information for future needs. Plan for follow-up call in 5-7 days based on severity of symptoms and risk factors. Plan for next call: follow up appointment-How did it go? This [facility] spoke with pt and pt stated that she is doing well. Patient denied any worsening symptoms.  Denied fever, chills, N/V and any difficulty breathing at this time.  Denied chest pain and SOB. Denied difficulty with urination, BMs or appetite. Pt had an appt with Dr. Augustin Mix on 02/17/22 for eval on cyst on left neck. Pt will have excised in his office. Denied any needs and concerns at this time. Advised pt to immediately report any worsening symptoms to the PCP. Patient verbalized understanding and agreed. Danyell Fernandez LPN, [address]  PH: [phone]              Care Transitions Subsequent and Final Call    Schedule Follow Up Appointment with PCP: Completed  Subsequent and Final Calls  Do you have any ongoing symptoms?: No  Have your medications changed?: No  Do you have any questions related to your medications?: No  Do you currently have any active services?: No  Do you have any needs or concerns that I can assist you with?: No  Identified Barriers: None  Care Transitions Interventions  No Identified Needs  Other Interventions:            Follow Up  Future Appointments   Date Time Provider Royce Balderas   2/24/2022  8:40 AM MD PORTIA Dickinson  Kita [facility]I   2/28/2022  3:00 PM Adin Aleman MD [id number]   6/21/2022  8:00 AM Edie Boyer DO [facility]   8/1/2022  8:00 AM MD PORTIA Dickinson  Kita  PAU Fernandez LPN

## 2022-02-24 ENCOUNTER — OFFICE VISIT (OUTPATIENT)
Dept: FAMILY MEDICINE CLINIC | Age: 62
End: 2022-02-24
Payer: MEDICARE

## 2022-02-24 VITALS
OXYGEN SATURATION: 99 % | HEIGHT: 64 IN | HEART RATE: 105 BPM | SYSTOLIC BLOOD PRESSURE: 104 MMHG | DIASTOLIC BLOOD PRESSURE: 70 MMHG | BODY MASS INDEX: 17.93 KG/M2 | RESPIRATION RATE: 18 BRPM | WEIGHT: 105 LBS

## 2022-02-24 DIAGNOSIS — E04.2 MULTINODULAR THYROID: ICD-10-CM

## 2022-02-24 DIAGNOSIS — J43.2 CENTRILOBULAR EMPHYSEMA (HCC): ICD-10-CM

## 2022-02-24 DIAGNOSIS — E46 MALNUTRITION, UNSPECIFIED TYPE (HCC): ICD-10-CM

## 2022-02-24 DIAGNOSIS — Z86.16 HISTORY OF 2019 NOVEL CORONAVIRUS DISEASE (COVID-19): Primary | ICD-10-CM

## 2022-02-24 DIAGNOSIS — E55.9 VITAMIN D DEFICIENCY: ICD-10-CM

## 2022-02-24 DIAGNOSIS — Z80.3 FH: BREAST CANCER IN FIRST DEGREE RELATIVE: ICD-10-CM

## 2022-02-24 DIAGNOSIS — D64.9 ANEMIA, UNSPECIFIED TYPE: ICD-10-CM

## 2022-02-24 DIAGNOSIS — E78.01 FAMILIAL HYPERCHOLESTEROLEMIA: ICD-10-CM

## 2022-02-24 DIAGNOSIS — F17.200 TOBACCO DEPENDENCE: ICD-10-CM

## 2022-02-24 DIAGNOSIS — R63.6 UNDERWEIGHT: ICD-10-CM

## 2022-02-24 DIAGNOSIS — E83.42 HYPOMAGNESEMIA: ICD-10-CM

## 2022-02-24 DIAGNOSIS — M81.8 OTHER OSTEOPOROSIS WITHOUT CURRENT PATHOLOGICAL FRACTURE: ICD-10-CM

## 2022-02-24 DIAGNOSIS — K80.20 CALCULUS OF GALLBLADDER WITHOUT CHOLECYSTITIS WITHOUT OBSTRUCTION: ICD-10-CM

## 2022-02-24 DIAGNOSIS — Z86.010 HX OF ADENOMATOUS COLONIC POLYPS: ICD-10-CM

## 2022-02-24 PROBLEM — A41.9 SEPSIS (HCC): Status: RESOLVED | Noted: 2022-02-07 | Resolved: 2022-02-24

## 2022-02-24 LAB
HCT VFR BLD CALC: 38.4 % (ref 36–48)
HEMOGLOBIN: 13.1 G/DL (ref 12–16)
MAGNESIUM: 1.7 MG/DL (ref 1.8–2.4)
MCH RBC QN AUTO: 31.3 PG (ref 26–34)
MCHC RBC AUTO-ENTMCNC: 34.2 G/DL (ref 31–36)
MCV RBC AUTO: 91.7 FL (ref 80–100)
PDW BLD-RTO: 12.9 % (ref 12.4–15.4)
PLATELET # BLD: 339 K/UL (ref 135–450)
PMV BLD AUTO: 7.5 FL (ref 5–10.5)
RBC # BLD: 4.18 M/UL (ref 4–5.2)
WBC # BLD: 4.8 K/UL (ref 4–11)

## 2022-02-24 PROCEDURE — 3017F COLORECTAL CA SCREEN DOC REV: CPT | Performed by: INTERNAL MEDICINE

## 2022-02-24 PROCEDURE — 1111F DSCHRG MED/CURRENT MED MERGE: CPT | Performed by: INTERNAL MEDICINE

## 2022-02-24 PROCEDURE — 3023F SPIROM DOC REV: CPT | Performed by: INTERNAL MEDICINE

## 2022-02-24 PROCEDURE — G8482 FLU IMMUNIZE ORDER/ADMIN: HCPCS | Performed by: INTERNAL MEDICINE

## 2022-02-24 PROCEDURE — G8427 DOCREV CUR MEDS BY ELIG CLIN: HCPCS | Performed by: INTERNAL MEDICINE

## 2022-02-24 PROCEDURE — 36415 COLL VENOUS BLD VENIPUNCTURE: CPT | Performed by: INTERNAL MEDICINE

## 2022-02-24 PROCEDURE — G8419 CALC BMI OUT NRM PARAM NOF/U: HCPCS | Performed by: INTERNAL MEDICINE

## 2022-02-24 PROCEDURE — 4004F PT TOBACCO SCREEN RCVD TLK: CPT | Performed by: INTERNAL MEDICINE

## 2022-02-24 PROCEDURE — 99213 OFFICE O/P EST LOW 20 MIN: CPT | Performed by: INTERNAL MEDICINE

## 2022-02-24 RX ORDER — LANOLIN ALCOHOL/MO/W.PET/CERES
400 CREAM (GRAM) TOPICAL DAILY
Qty: 90 TABLET | Refills: 3 | Status: SHIPPED | OUTPATIENT
Start: 2022-02-24 | End: 2022-08-02 | Stop reason: SDUPTHER

## 2022-02-24 NOTE — PROGRESS NOTES
HPI: Eddy Meléndez presents for follow-up COVID. Health issues include grief, emphysema, insomnia, adenomatous polyp with recheck due in 2020, hyperlipidemia, goiter, tobacco addiction,  osteoporosis, asymptomatic gallstones specialization Covid pneumonia. Hospitalization 2/7/2022 + COVID pneumonia. No hypoxemia. Treated with nebulizers low magnesium. CTA negative PE, positive thoracic atherosclerosis, emphysema, pulmonary nodule and probable gallstone. Transiently low blood pressure. Feels back to her normal self and fortunately has gotten back to smoking about 1/3 to 1/4 pack a day. Has not used her nebulizer or albuterol within the last couple of days. Is continued on her maintenance medicine no orthostasis. Energy is back to her normal self.        .Underweight.  History of depression.  Positive tobacco.  Colon polyps.  No current GE reflux.  Taking Ensure in the morning.  Is to call for colonoscopy     Atypical chest pain.  Abnormal thallium with heart catheterization 8/2021 nonobstructive coronary disease. .  Positive aspirin, metoprolol, atorvastatin 80, Zetia 10. LDL 64 June 2021 no problems with medication.     Severe emphysema FEV1 of 1.4 in 2017.  Has cut back to tobacco about 1/4 pack a day from 4 packs a day.  Positive Breo and Spiriva.  As needed nebulizer and albuterol. Covid vaccine #2.  Remote unprovoked PE.  CT with multi lobular emphysema nodule. Recheck February 2023  Follows with pulmonary      Review of CT scan shows gallstones.  Asymptomatic.     Dysphagia. . The current problem.  History Heimlich maneuver with pill and intermittent choking. .  Multinodular goiter no repeat ultrasound needed. .  Positive tobacco.  Modified barium swallow without aspiration.  Recommended sitting upright with meals and taking meds with purée or liquid.  Has had no more issues.  Euthyroid.     Adenomatous polyp 2017 and negative upper endoscopy for sprue.  Consider bacterial overgrowth with H2 breath test.  Follows with Dr. Ernie Fuller diarrhea or change in stools     Osteoporosis.  Positive vitamin D supplementation.  Reclast injection 2021 without adverse effects.  DEXA scan 2020 with T score of -3.8 and lumbar -3 in the right and left femoral neck.  Normal PTH, thyroid and vitamin D.neg sprue.  Positive caffeine, tobacco, low BMI.  + tobacco  Positive sodas 5 daily. .  Following with rheumatology.  Positive osteoarthritis.  No longer using nonsteroidal anti-inflammatories.         Chronic headaches significantly improved.  MRI with microvascular changes only.  No longer using daily Motrin. .          41 years taken off ventilator 2017.  + grief persist.  Doing her new puppy       Hyperlipidemia  down with atorvastatin and zetia  No liver function abnormality.  No claudication or TIA symptoms.   CTA with coronary calcifications.    No obstructive coronary disease on catheterization .  LDL      Multinodular goiter.  Euthyroid.  No palpitations or stridor.  Dysphagia doing better.     OA hips and back      .        vaginal delivery without complication. No gestational diabetes or hypertension. Tubal ligation. Early menopause.  Denies change in visual acuity cotesting 2017 was normal.  Sister with breast. cancer.  Mammogram BI-RADS 2021.  Does exams.  No current concern    Specialists  Lung,   Rheum  colon     PMH:    Childbirth     PE  20's     pneumonia     Tubal      Coronary catheterization nonobstructive disease      SH: moved into home with daughter and    + tobacco 4 packs daily down to 1/4 daily. Honorio Braxton alcohol.  No recreational drugs.  Retired minimal exercise. .   2017 after prolonged illness.  No partners.       3 brother 3 sisters    + [facility]     -colon, ovarian cancer     ROS: Decreased vision.    No concerns with memory.  No falls.  Edentulous.  Chronic shortness of breath and wheeze.  postnasal drip.  Depression and anxiety with grief.    Insomnia improved. Positive palpitations near syncope and chest discomfort. In the past.  No breast masses.  Occasional dyspepsia.  Colonic polyps recheck due 2020. Naseem De Anda urinary concerns.  Occasional arthralgias.  Low BMI stable.  Wears sunscreen now.  Safe at home    Constitutional, ent, CV, respiratory, GI, , joint, skin, allergic and psychiatric ROS reviewed and negative except for above    Allergies   Allergen Reactions    Pcn [Penicillins]        Outpatient Medications Marked as Taking for the 2/24/22 encounter (Office Visit) with Ed Hunter MD   Medication Sig Dispense Refill    tiotropium (SPIRIVA RESPIMAT) 2.5 MCG/ACT AERS inhaler INHALE 2 PUFFS INTO THE LUNGS EVERY DAY 3 each 3    Fluticasone furoate-vilanterol (BREO ELLIPTA) 200-25 MCG/INH AEPB inhaler Inhale 1 puff into the lungs daily One puff daily 3 each 3    albuterol sulfate HFA (VENTOLIN HFA) 108 (90 Base) MCG/ACT inhaler Inhale 2 puffs into the lungs 4 times daily 3 each 3    aspirin (ASPIRIN CHILDRENS) 81 MG chewable tablet Take 1 tablet by mouth daily 30 tablet 3    atorvastatin (LIPITOR) 80 MG tablet TAKE ONE TABLET BY MOUTH DAILY 90 tablet 3    albuterol (PROVENTIL) (2.5 MG/3ML) 0.083% nebulizer solution Take 3 mLs by nebulization every 4 hours as needed for Wheezing 120 mL 5    ezetimibe (ZETIA) 10 MG tablet Take 1 tablet by mouth daily In addition to atorvastatin for cholesterol 90 tablet 0    vitamin D (ERGOCALCIFEROL) 1.25 MG (47387 UT) CAPS capsule 1 po q 2 weeks 6 capsule 3    calcium gluconate 500 MG tablet Take 1 tablet by mouth daily 30 tablet 5             Past Medical History:   Diagnosis Date    Abnormal screening mammogram 7/31/2017    Dx right pending    Anxiety     Arthritis     Asthma     Cholelithiases 3/17/2021    Colon polyps 10/30/2017    Multiple path pending 10.2017  Recheck 10.2020    Depression     Headache 8/21/2019    History of abnormal mammogram 7/31/2017    birads 3 right recheck 8.2018 9.2019 recheck 1 year 10.2020 birad 1    Hx of adenomatous colonic polyps 10/30/2017    adenoma 10.2017  Recheck 10.2020    MDD (recurrent major depressive disorder) in remission ([facility] [address] 3/11/2020    Multinodular thyroid 7/25/2017 7.2017 euthyroid recheck 1 year    Oral phase dysphagia 4/28/2021    Other osteoporosis without current pathological fracture 2/21/2020 2.2020  T-score of  -3.8 and a Z-score of -2.3.  spine; hip -3.0 and a Z-score of -1.7       Past Surgical History:   Procedure Laterality Date    TONSILLECTOMY      TUBAL LIGATION               Family History   Problem Relation Age of Onset    COPD Father     Breast Cancer Sister     Heart Attack Brother            Objective     /70   Pulse 105   Resp 18   Ht 5' 4\" (1.626 m)   Wt 105 lb (47.6 kg)   SpO2 99% Comment: RA  BMI 18.02 kg/m²     @LASTSAO2(3)@    Wt Readings from Last 3 Encounters:   02/17/22 104 lb (47.2 kg)   02/09/22 107 lb 12.8 oz (48.9 kg)   01/31/22 107 lb (48.5 kg)       Physical Exam     NAD alert and cooperative  HEENT: Edentulous. Pink conjunctive a. Positive goiter no tenderness no bruit. Significantly diminished breath sounds. Cardiovascular exam regular rate and rhythm no murmur click. Abdomen is scaphoid no masses. No peripheral edema. Decreased subcutaneous fat. She is appropriate well-groomed.   Good eye contact    Chemistry        Component Value Date/Time     02/09/2022 0439    K 4.2 02/09/2022 0439     (H) 02/09/2022 0439    CO2 23 02/09/2022 0439    BUN 11 02/09/2022 0439    CREATININE 0.8 02/09/2022 0439        Component Value Date/Time    CALCIUM 7.9 (L) 02/09/2022 0439    ALKPHOS 75 02/09/2022 0439    AST 39 (H) 02/09/2022 0439    ALT 20 02/09/2022 0439    BILITOT <0.2 02/09/2022 0439            Lab Results   Component Value Date    WBC 4.4 02/09/2022    HGB 10.6 (L) 02/09/2022    HCT 31.5 (L) 02/09/2022    MCV 92.4 02/09/2022     02/09/2022     Lab Results   Component Value Date    LABA1C 5.4 07/20/2017     Lab Results   Component Value Date    .3 07/20/2017     Lab Results   Component Value Date    LABA1C 5.4 07/20/2017     No components found for: CHLPL  Lab Results   Component Value Date    TRIG 99 06/24/2021    TRIG 148 10/13/2020    TRIG 130 11/13/2019     Lab Results   Component Value Date    HDL 43 06/24/2021    HDL 49 10/13/2020    HDL 61 (H) 11/13/2019     Lab Results   Component Value Date    LDLCALC 64 06/24/2021    LDLCALC 155 (H) 10/13/2020    LDLCALC 172 (H) 11/13/2019     Lab Results   Component Value Date    LABVLDL 20 06/24/2021    LABVLDL 30 10/13/2020    LABVLDL 26 11/13/2019       Old labs and records reviewed or requested  Discussed past lab and studies with patient      Diagnosis Orders   1. History of 2019 novel coronavirus disease (COVID-19)     2. Familial hypercholesterolemia     3. Calculus of gallbladder without cholecystitis without obstruction     4. Centrilobular emphysema ([address]     5. FH: breast cancer in first degree relative     6. Hx of adenomatous colonic polyps     7. Malnutrition, unspecified type ([address]     8. Multinodular thyroid     9. Tobacco dependence     10. Underweight     11. Vitamin D deficiency     12. Other osteoporosis without current pathological fracture     13. Anemia, unspecified type  CBC   14. Hypomagnesemia  Magnesium     Resolved COVID. Did have mild anemia and low magnesium during hospitalization when reviewing laboratories we will recheck today. Hyperlipidemia recheck in July and Pap smear at that time as well. No symptoms of gallstones. Chronic emphysema tobacco addiction discussed cessation. Due colonoscopy she will call. Multinodular euthyroid goiter. Vitamin D deficiency good last check.     Health maintenance due tetanus vaccine shingles and third COVID  On this date I have spent 20 minutes reviewing previous notes, test

## 2022-02-24 NOTE — PATIENT INSTRUCTIONS
Call for colonoscopy. Follow-up in July we can do your Pap smear then. Consider your Tdap and shingles vaccine    Be aware of any symptoms consistent with gallstones.     Cut back on tobacco more

## 2022-02-28 ENCOUNTER — OFFICE VISIT (OUTPATIENT)
Dept: SURGERY | Age: 62
End: 2022-02-28
Payer: MEDICARE

## 2022-02-28 DIAGNOSIS — L72.0 EPIDERMOID CYST OF NECK: Primary | ICD-10-CM

## 2022-02-28 PROCEDURE — 11421 EXC H-F-NK-SP B9+MARG 0.6-1: CPT | Performed by: SURGERY

## 2022-02-28 PROCEDURE — 99999 PR OFFICE/OUTPT VISIT,PROCEDURE ONLY: CPT | Performed by: SURGERY

## 2022-02-28 NOTE — PROGRESS NOTES
Procedure Note    [facility] - [place]ERT[facility]  [place] and [facility]   Kiran Crystal MD    [address], [address]  [place]  Phone: [phone]  Fax: [phone]    Irina Bruno   YOB: 1960    Date of Visit:  2/28/2022    No ref. provider found  Octaviano Bass MD    HPI:   Sebaceous cyst: Irina Bruno presents for excision of her protruding and enlarging left neck cyst.  No issues since her last visit in office. She is ready for excision. Allergies   Allergen Reactions    Pcn [Penicillins]      No outpatient medications have been marked as taking for the 2/28/22 encounter (Office Visit) with Miranda Ricci MD.       Past Medical History:   Diagnosis Date    Abnormal screening mammogram 7/31/2017    Dx right pending    Anxiety     Arthritis     Asthma     Cholelithiases 3/17/2021    Colon polyps 10/30/2017    Multiple path pending 10.2017  Recheck 10.2020    Depression     Headache 8/21/2019    History of 2019 novel coronavirus disease (COVID-19) 2/8/2022    History of abnormal mammogram 7/31/2017    birads 3 right recheck 8.2018 9.2019 recheck 1 year 10.2020 birad 1    Hx of adenomatous colonic polyps 10/30/2017    adenoma 10.2017  Recheck 10.2020    MDD (recurrent major depressive disorder) in remission ([facility] [address] 3/11/2020    Multinodular thyroid 7/25/2017 7.2017 euthyroid recheck 1 year    Oral phase dysphagia 4/28/2021    Other osteoporosis without current pathological fracture 2/21/2020 2.2020  T-score of  -3.8 and a Z-score of -2.3.  spine; hip -3.0 and a Z-score of -1.7     Past Surgical History:   Procedure Laterality Date    TONSILLECTOMY      TUBAL LIGATION       Family History   Problem Relation Age of Onset    COPD Father     Breast Cancer Sister     Heart Attack Brother      Social History     Socioeconomic History    Marital status:       Spouse name: Not on file    Number of children: Not on file    Years of education: Not on file    Highest education level: Not on file   Occupational History    Not on file   Tobacco Use    Smoking status: Current Every Day Smoker     Packs/day: 1.00     Years: 50.00     Pack years: 50.00     Types: Cigarettes    Smokeless tobacco: Never Used    Tobacco comment: cutting back now at 1/2 pack per day   Vaping Use    Vaping Use: Never used   Substance and Sexual Activity    Alcohol use: No    Drug use: No    Sexual activity: Yes     Partners: Male   Other Topics Concern    Not on file   Social History Narrative    Not on file     Social Determinants of Health     Financial Resource Strain: Low Risk     Difficulty of Paying Living Expenses: Not hard at all   Food Insecurity: No Food Insecurity    Worried About Running Out of Food in the Last Year: Never true    Felisha of Food in the Last Year: Never true   Transportation Needs:     Lack of Transportation (Medical): Not on file    Lack of Transportation (Non-Medical): Not on file   Physical Activity:     Days of Exercise per Week: Not on file    Minutes of Exercise per Session: Not on file   Stress:     Feeling of Stress : Not on file   Social Connections:     Frequency of Communication with Friends and Family: Not on file    Frequency of Social Gatherings with Friends and Family: Not on file    Attends Scientologist Services: Not on file    Active Member of [address] [place] [address] or Organizations: Not on file    Attends Club or Organization Meetings: Not on file    Marital Status: Not on file   Intimate Partner Violence:     Fear of Current or Ex-Partner: Not on file    Emotionally Abused: Not on file    Physically Abused: Not on file    Sexually Abused: Not on file   Housing Stability:     Unable to Pay for Housing in the Last Year: Not on file    Number of [place] in the Last Year: Not on file    Unstable Housing in the Last Year: Not on file          There were no vitals filed for this visit.   There is no height or weight on file to calculate BMI. Wt Readings from Last 3 Encounters:   02/24/22 105 lb (47.6 kg)   02/17/22 104 lb (47.2 kg)   02/09/22 107 lb 12.8 oz (48.9 kg)     BP Readings from Last 3 Encounters:   02/24/22 104/70   02/17/22 124/76   02/09/22 113/63          REVIEW OF SYSTEMS:   Pertinent positives are in HPI, otherwise all systems reviewed and negative    PHYSICAL EXAM:    CONSTITUTIONAL:  awake, alert, no apparent distress and thin  ENT:  normocephalic, without obvious abnormality  NECK:  supple, symmetrical, trachea midline   LUNGS:  Resp easy and unlabored  MUSCULOSKELETAL: No edema  NEUROLOGIC:  Mental Status Exam:  Level of Alertness:   awake  Orientation:   person, place, time  SKIN: left neck with sub centimeter cutaneous mass, mobile, without erythema or induration      ASSESSMENT:     Diagnosis Orders   1. Epidermoid cyst of neck  SURGICAL PATHOLOGY    32715 - MN EXC SKIN BENIG 0.6-1CM REMAINDR BODY         PLAN:    After informed consent was obtained, the left neck was prepped and draped. 2% lidocaine with epinephrine was injected around the neck mass. A 15 blade scalpel was used to make a 0.75 cm by 1.5 cm elliptical incision around the cyst down to the level of the subcutaneous tissue. Careful dissection was performed around the cyst.  The cyst was able to be removed in its entirety and measured 0.8 by 0.9 cm. Pressure was held for hemostasis. An interrupted deep dermal 3-0 vicryl suture was placed to reapproximate the skin edges, followed by a running 4-0 vicryl subcuticular layer. Skin affix was placed over the incision. The patient tolerated the procedure well. Tylenol/ibuprofen for pain control. Will call her with the pathology results. Will have her follow up as needed.     Electronically signed by Nu Skelton MD on 2/28/2022 at 3:56 PM

## 2022-02-28 NOTE — PATIENT INSTRUCTIONS
Numbing medication will wear off in 1-2 hours. OK to take tylenol or ibuprofen for pain control. OK to shower overtop of water tight glue. (No bathing or submerging until fully healed. 2-3 weeks)    Do not scrub incision. Allow warm soapy water to run overtop. Pat dry. Sutures are dissolvable. These will absorb over time. No restrictions, activities as tolerated. Call for any worsening redness, pain, or fevers greater than 101.5. Office will call with pathology results. Follow up as needed. If questions or concerns, please call Lissett @ [phone].

## 2022-03-02 ENCOUNTER — CARE COORDINATION (OUTPATIENT)
Dept: CASE MANAGEMENT | Age: 62
End: 2022-03-02

## 2022-03-02 NOTE — CARE COORDINATION
[facility] Transitions Follow Up Call    3/2/2022    Patient: Nadeem Rodas  Patient : 1960   MRN: [id number]  Reason for Admission: COvid  Discharge Date: 22 RARS: Readmission Risk Score: 11 ( )         Spoke with: [facility] Transitions Follow Up Call    Needs to be reviewed by the provider   Additional needs identified to be addressed with provider: No  none             Method of communication with provider : none      Care Transition Nurse (CTN) contacted the patient by telephone to follow up after admission on 22. Verified name and  with patient as identifiers. Addressed changes since last contact: medications-Magnesium  PCP F/U, Excision of neck cyst  Discussed follow-up appointments. If no appointment was previously scheduled, appointment scheduling offered: Yes. Is follow up appointment scheduled within 7 days of discharge? Yes. Advance Care Planning:   Does patient have an Advance Directive: Not on file. CTN reviewed discharge instructions, medical action plan and red flags with patient and discussed any barriers to care and/or understanding of plan of care after discharge. Discussed appropriate site of care based on symptoms and resources available to patient including: PCP, Specialist, Urgent care clinics and When to call 911. The patient agrees to contact the PCP office for questions related to their healthcare. Patients top risk factors for readmission: ineffective coping  stages of grief  Interventions to address risk factors: Obtained and reviewed discharge summary and/or continuity of care documents      Non-[facility] follow up appointment(s):     CTN provided contact information for future needs. Plan for follow-up call in 5-7 days based on severity of symptoms and risk factors. Plan for next call: self management-Neck pain from cyst removal       This [facility] spoke with pt and pt stated that she is doing well. Patient denied any worsening symptoms.  Denied fever, chills, N/V and any difficulty breathing at this time. Denied chest pain and SOB. Denied difficulty with urination, BMs or appetite. Pt had a f/u with PCP on 02/24/22 and  and excision of cyst on neck with [facility] on 02/28/22. Both appts went well. Magnesium added by PCP as pt's labs reflected low levels. Pt tolerated procedure well and to use Tylenol/Ibuprofen for any pain. Denied any needs and concerns at this time. Advised pt to immediately report any worsening symptoms to the PCP. Patient verbalized understanding and agreed. Domingo Burnett LPN, [facility]  PH: [phone]            Care Transitions Subsequent and Final Call    Schedule Follow Up Appointment with PCP: Completed  Subsequent and Final Calls  Do you have any ongoing symptoms?: No  Have your medications changed?: Yes  Patient Reports: Magnesium oxide added  Do you have any questions related to your medications?: No  Do you currently have any active services?: No  Do you have any needs or concerns that I can assist you with?: No  Identified Barriers: None  Care Transitions Interventions  No Identified Needs  Other Interventions:            Follow Up  Future Appointments   Date Time Provider Royce Balderas   6/21/2022  8:00 AM Mirella Pate DO [facility] PULM [facility]   8/1/2022  8:00 AM MD PORTIA Eller  Kita Burnett LPN

## 2022-03-03 RX ORDER — ERGOCALCIFEROL 1.25 MG/1
CAPSULE ORAL
Qty: 6 CAPSULE | Refills: 3 | Status: SHIPPED | OUTPATIENT
Start: 2022-03-03

## 2022-03-09 ENCOUNTER — CARE COORDINATION (OUTPATIENT)
Dept: CASE MANAGEMENT | Age: 62
End: 2022-03-09

## 2022-03-09 NOTE — CARE COORDINATION
[facility] [address] Transitions Follow Up Call    3/9/2022    Patient: Abdirizak Issa  Patient : 1960   MRN: [id number]  Reason for Admission: COVID  Discharge Date: 22 RARS: Readmission Risk Score: 11 ( )         Spoke with: Jas Quigley Transitions Subsequent and Final Call    Subsequent and Final Calls  Do you have any ongoing symptoms?: No  Have your medications changed?: No  Do you have any questions related to your medications?: No  Do you currently have any active services?: No  Do you have any needs or concerns that I can assist you with?: No  Identified Barriers: None  Care Transitions Interventions  Other Interventions: Follow Up: Patient reports that she is doing very well, denies any and all symptoms of COVID. She has followed up with PCP and denies any questions or concerns at this time. CTN will resolve episode and remain available.     Future Appointments   Date Time Provider Royce Balderas   2022  8:00 AM Anurag Luna DO [facility] PULM [facility]   2022  8:00 AM MD PORTIA Sebastian RN

## 2022-03-10 PROBLEM — E86.0 DEHYDRATION: Status: RESOLVED | Noted: 2022-02-08 | Resolved: 2022-03-10

## 2022-04-11 ENCOUNTER — TELEPHONE (OUTPATIENT)
Dept: CASE MANAGEMENT | Age: 62
End: 2022-04-11

## 2022-06-21 ENCOUNTER — OFFICE VISIT (OUTPATIENT)
Dept: PULMONOLOGY | Age: 62
End: 2022-06-21
Payer: MEDICARE

## 2022-06-21 VITALS
OXYGEN SATURATION: 98 % | DIASTOLIC BLOOD PRESSURE: 75 MMHG | BODY MASS INDEX: 17.75 KG/M2 | HEIGHT: 64 IN | TEMPERATURE: 97.5 F | WEIGHT: 104 LBS | RESPIRATION RATE: 20 BRPM | HEART RATE: 97 BPM | SYSTOLIC BLOOD PRESSURE: 110 MMHG

## 2022-06-21 DIAGNOSIS — R91.1 LUNG NODULE: ICD-10-CM

## 2022-06-21 DIAGNOSIS — Z72.0 TOBACCO ABUSE: ICD-10-CM

## 2022-06-21 DIAGNOSIS — J44.9 COPD, SEVERE (HCC): Primary | ICD-10-CM

## 2022-06-21 PROCEDURE — G8427 DOCREV CUR MEDS BY ELIG CLIN: HCPCS | Performed by: INTERNAL MEDICINE

## 2022-06-21 PROCEDURE — 3023F SPIROM DOC REV: CPT | Performed by: INTERNAL MEDICINE

## 2022-06-21 PROCEDURE — 3017F COLORECTAL CA SCREEN DOC REV: CPT | Performed by: INTERNAL MEDICINE

## 2022-06-21 PROCEDURE — 99214 OFFICE O/P EST MOD 30 MIN: CPT | Performed by: INTERNAL MEDICINE

## 2022-06-21 PROCEDURE — G8419 CALC BMI OUT NRM PARAM NOF/U: HCPCS | Performed by: INTERNAL MEDICINE

## 2022-06-21 PROCEDURE — 4004F PT TOBACCO SCREEN RCVD TLK: CPT | Performed by: INTERNAL MEDICINE

## 2022-06-21 RX ORDER — ALBUTEROL SULFATE 90 UG/1
2 AEROSOL, METERED RESPIRATORY (INHALATION) 4 TIMES DAILY
Qty: 3 EACH | Refills: 3 | Status: SHIPPED | OUTPATIENT
Start: 2022-06-21

## 2022-06-21 ASSESSMENT — COPD QUESTIONNAIRES
QUESTION1_COUGHFREQUENCY: 3
QUESTION4_WALKINCLINE: 1
QUESTION2_CHESTPHLEGM: 3
QUESTION6_LEAVINGHOUSE: 1
QUESTION8_ENERGYLEVEL: 3
CAT_TOTALSCORE: 17
QUESTION3_CHESTTIGHTNESS: 3
QUESTION5_HOMEACTIVITIES: 0
QUESTION7_SLEEPQUALITY: 3

## 2022-06-21 NOTE — PROGRESS NOTES
Chief complaint  This is a 60y.o. year old female  who comes to see me with a chief complaint of   Chief Complaint   Patient presents with    Follow-up    COPD       HPI  Here with a cc of COPD. She is doing well. She continues to work on cutting down on tobacco.  Pamella Levy remains on triple inhalers. Has used albuterol slightly more of late due to heat and humidity but not ill. Bryanna Trejo she was in hospital in Feb with Judy. She had no respiratory issues related to that but was having issues with low blood pressure. .  That has improved.   Had a CT Chest then which showed stable findings       Current Outpatient Medications:     tiotropium (SPIRIVA RESPIMAT) 2.5 MCG/ACT AERS inhaler, INHALE 2 PUFFS INTO THE LUNGS EVERY DAY, Disp: 3 each, Rfl: 3    albuterol sulfate HFA (VENTOLIN HFA) 108 (90 Base) MCG/ACT inhaler, Inhale 2 puffs into the lungs 4 times daily, Disp: 3 each, Rfl: 3    vitamin D (ERGOCALCIFEROL) 1.25 MG (89491 UT) CAPS capsule, TAKE ONE CAPSULE BY MOUTH EVERY 2 WEEKS, Disp: 6 capsule, Rfl: 3    Fluticasone furoate-vilanterol (BREO ELLIPTA) 200-25 MCG/INH AEPB inhaler, Inhale 1 puff into the lungs daily One puff daily, Disp: 3 each, Rfl: 3    aspirin (ASPIRIN CHILDRENS) 81 MG chewable tablet, Take 1 tablet by mouth daily, Disp: 30 tablet, Rfl: 3    atorvastatin (LIPITOR) 80 MG tablet, TAKE ONE TABLET BY MOUTH DAILY, Disp: 90 tablet, Rfl: 3    albuterol (PROVENTIL) (2.5 MG/3ML) 0.083% nebulizer solution, Take 3 mLs by nebulization every 4 hours as needed for Wheezing, Disp: 120 mL, Rfl: 5    ezetimibe (ZETIA) 10 MG tablet, Take 1 tablet by mouth daily In addition to atorvastatin for cholesterol, Disp: 90 tablet, Rfl: 0    calcium gluconate 500 MG tablet, Take 1 tablet by mouth daily, Disp: 30 tablet, Rfl: 5    magnesium oxide (MAG-OX) 400 (240 Mg) MG tablet, Take 1 tablet by mouth daily (Patient not taking: Reported on 6/21/2022), Disp: 90 tablet, Rfl: 3    PHYSICAL EXAM:  Vitals: 06/21/22 0808   BP: 110/75   Pulse: 97   Resp: 20   Temp: 97.5 °F (36.4 °C)   SpO2: 98%       GENERAL:  Thin, NAD  HEENT:  No scleral icterus, no conjunctival irritation  NECK:  No thyromegaly, no bruits  LYMPH:  No cervical or supraclavicular adenopathy  HEART:  Regular, rate. no murmurs  LUNGS: Slight expiratory wheezing   ABDOMEN:  No distention, no organomegaly  EXTREMITIES:  No edema, no digital clubbing  NEURO:  No localizing deficits, CN II-XII intact    Pulmonary Function Testing  3/2018  My interpretation is severe obstruction with hyperinflation and reduced diffusing capacity. Bronchodilator response    Chest imaging from 2/22 is reviewed. My interpretation is diffuse emphysema stable RUL nodule    Alpha-1 Anti-trypsin levels:  113, Phenotype:  M1S       I reviewed the above labs and images    COPD Assessment Test    1. I never cough vs I cough all the time:  3  2. I have no phlegm vs I am completely full of phlegm. 3  3. My chest does not feel tight vs my chest feel vs tight. 3  4. Not breathless with inclines vs breathless with inclines. 1  5. Not limited with ADLs vs Very limited with ADLs. 0  6. Confidence leaving home vs no confidence. 1  7. I sleep soundly vs I don't sleep soundly. 3  8. I have lots of energy vs I have no energy. 3    TOTAL POINTS: 17  (22, 30, 21)    Scoring:    A) >30. Very high impact on quality of life. Optimize therapy including rehab  B) >20. High impact on quality of life. C) 10-20. Medium impact on qualify of life  D) <10. Low impact on life  E)  5.  Upper limit of normal in health non-smoker      Assessment/Plan:  1. COPD, severe ([address]  Seems to be controlled. Recent increase use of albuterol is consistent with warmer weather. No reason to panic at this time. - tiotropium (SPIRIVA RESPIMAT) 2.5 MCG/ACT AERS inhaler; INHALE 2 PUFFS INTO THE LUNGS EVERY DAY  Dispense: 3 each;  Refill: 3  - albuterol sulfate HFA (VENTOLIN HFA) 108 (90 Base) MCG/ACT inhaler; Inhale 2 puffs into the lungs 4 times daily  Dispense: 3 each; Refill: 3    2. Tobacco abuse  Active. Continues to work on decreasing her usage. Was at one point smoking 5 ppd    3. Lung nodule  Stable on imaging and likely granuloma.   Will need yearly CT scans for screening more than for nodule monitoring     Follow up in 6 months    Pulmonary Rehab:  Declined in the past    Lung Cancer Screening CT:  2/22

## 2022-06-28 RX ORDER — ATORVASTATIN CALCIUM 80 MG/1
TABLET, FILM COATED ORAL
Qty: 90 TABLET | Refills: 0 | Status: SHIPPED | OUTPATIENT
Start: 2022-06-28

## 2022-07-05 ENCOUNTER — TELEPHONE (OUTPATIENT)
Dept: FAMILY MEDICINE CLINIC | Age: 62
End: 2022-07-05

## 2022-07-05 NOTE — TELEPHONE ENCOUNTER
----- Message from [address] sent at 7/5/2022 10:07 AM EDT -----  Subject: Message to Provider    QUESTIONS  Information for Provider? pt would like dr Olivia Cuevas to call her would not   say why   ---------------------------------------------------------------------------  --------------  [address]  [phone]; OK to leave message on voicemail  ---------------------------------------------------------------------------  --------------  SCRIPT ANSWERS  Relationship to Patient?  Self

## 2022-07-05 NOTE — TELEPHONE ENCOUNTER
Patient needs a medical history statement from Dr Shelly Dominguez to send to Damaris Tay. States she needs a letter to dismiss her from jury duty. Patient will  letter.
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
## 2022-08-01 ENCOUNTER — OFFICE VISIT (OUTPATIENT)
Dept: FAMILY MEDICINE CLINIC | Age: 62
End: 2022-08-01
Payer: MEDICARE

## 2022-08-01 VITALS
DIASTOLIC BLOOD PRESSURE: 78 MMHG | RESPIRATION RATE: 16 BRPM | OXYGEN SATURATION: 98 % | WEIGHT: 105 LBS | HEIGHT: 64 IN | BODY MASS INDEX: 17.93 KG/M2 | SYSTOLIC BLOOD PRESSURE: 131 MMHG | HEART RATE: 70 BPM

## 2022-08-01 DIAGNOSIS — E04.2 MULTINODULAR THYROID: ICD-10-CM

## 2022-08-01 DIAGNOSIS — F17.200 TOBACCO DEPENDENCE: ICD-10-CM

## 2022-08-01 DIAGNOSIS — Z86.010 HX OF ADENOMATOUS COLONIC POLYPS: ICD-10-CM

## 2022-08-01 DIAGNOSIS — Z78.0 POST-MENOPAUSAL: ICD-10-CM

## 2022-08-01 DIAGNOSIS — Z80.3 FH: BREAST CANCER IN FIRST DEGREE RELATIVE: ICD-10-CM

## 2022-08-01 DIAGNOSIS — R63.6 UNDERWEIGHT: ICD-10-CM

## 2022-08-01 DIAGNOSIS — R73.9 ELEVATED BLOOD SUGAR: ICD-10-CM

## 2022-08-01 DIAGNOSIS — M81.8 OTHER OSTEOPOROSIS WITHOUT CURRENT PATHOLOGICAL FRACTURE: ICD-10-CM

## 2022-08-01 DIAGNOSIS — J43.2 CENTRILOBULAR EMPHYSEMA (HCC): Primary | ICD-10-CM

## 2022-08-01 DIAGNOSIS — E78.01 FAMILIAL HYPERCHOLESTEROLEMIA: ICD-10-CM

## 2022-08-01 DIAGNOSIS — E83.42 HYPOMAGNESEMIA: ICD-10-CM

## 2022-08-01 DIAGNOSIS — E55.9 VITAMIN D DEFICIENCY: ICD-10-CM

## 2022-08-01 DIAGNOSIS — K80.20 CALCULUS OF GALLBLADDER WITHOUT CHOLECYSTITIS WITHOUT OBSTRUCTION: ICD-10-CM

## 2022-08-01 DIAGNOSIS — R13.11 ORAL PHASE DYSPHAGIA: ICD-10-CM

## 2022-08-01 LAB
A/G RATIO: 1.6 (ref 1.1–2.2)
ALBUMIN SERPL-MCNC: 3.8 G/DL (ref 3.4–5)
ALP BLD-CCNC: 96 U/L (ref 40–129)
ALT SERPL-CCNC: 11 U/L (ref 10–40)
ANION GAP SERPL CALCULATED.3IONS-SCNC: 10 MMOL/L (ref 3–16)
AST SERPL-CCNC: 17 U/L (ref 15–37)
BILIRUB SERPL-MCNC: 0.3 MG/DL (ref 0–1)
BUN BLDV-MCNC: 8 MG/DL (ref 7–20)
CALCIUM SERPL-MCNC: 8.6 MG/DL (ref 8.3–10.6)
CHLORIDE BLD-SCNC: 105 MMOL/L (ref 99–110)
CHOLESTEROL, TOTAL: 125 MG/DL (ref 0–199)
CO2: 27 MMOL/L (ref 21–32)
CREAT SERPL-MCNC: 0.5 MG/DL (ref 0.6–1.2)
GFR AFRICAN AMERICAN: >60
GFR NON-AFRICAN AMERICAN: >60
GLUCOSE BLD-MCNC: 83 MG/DL (ref 70–99)
HCT VFR BLD CALC: 35.2 % (ref 36–48)
HDLC SERPL-MCNC: 49 MG/DL (ref 40–60)
HEMOGLOBIN: 12.1 G/DL (ref 12–16)
LDL CHOLESTEROL CALCULATED: 55 MG/DL
MAGNESIUM: 1.7 MG/DL (ref 1.8–2.4)
MCH RBC QN AUTO: 31.3 PG (ref 26–34)
MCHC RBC AUTO-ENTMCNC: 34.4 G/DL (ref 31–36)
MCV RBC AUTO: 91.1 FL (ref 80–100)
PDW BLD-RTO: 13.6 % (ref 12.4–15.4)
PLATELET # BLD: 267 K/UL (ref 135–450)
PMV BLD AUTO: 8 FL (ref 5–10.5)
POTASSIUM SERPL-SCNC: 4.2 MMOL/L (ref 3.5–5.1)
RBC # BLD: 3.86 M/UL (ref 4–5.2)
SODIUM BLD-SCNC: 142 MMOL/L (ref 136–145)
TOTAL PROTEIN: 6.2 G/DL (ref 6.4–8.2)
TRIGL SERPL-MCNC: 104 MG/DL (ref 0–150)
VITAMIN D 25-HYDROXY: 61.6 NG/ML
VLDLC SERPL CALC-MCNC: 21 MG/DL
WBC # BLD: 5.8 K/UL (ref 4–11)

## 2022-08-01 PROCEDURE — G8419 CALC BMI OUT NRM PARAM NOF/U: HCPCS | Performed by: INTERNAL MEDICINE

## 2022-08-01 PROCEDURE — 99214 OFFICE O/P EST MOD 30 MIN: CPT | Performed by: INTERNAL MEDICINE

## 2022-08-01 PROCEDURE — 3023F SPIROM DOC REV: CPT | Performed by: INTERNAL MEDICINE

## 2022-08-01 PROCEDURE — G8427 DOCREV CUR MEDS BY ELIG CLIN: HCPCS | Performed by: INTERNAL MEDICINE

## 2022-08-01 PROCEDURE — 36415 COLL VENOUS BLD VENIPUNCTURE: CPT | Performed by: INTERNAL MEDICINE

## 2022-08-01 PROCEDURE — 3017F COLORECTAL CA SCREEN DOC REV: CPT | Performed by: INTERNAL MEDICINE

## 2022-08-01 PROCEDURE — 4004F PT TOBACCO SCREEN RCVD TLK: CPT | Performed by: INTERNAL MEDICINE

## 2022-08-02 LAB
ESTIMATED AVERAGE GLUCOSE: 114 MG/DL
HBA1C MFR BLD: 5.6 %

## 2022-08-02 RX ORDER — LANOLIN ALCOHOL/MO/W.PET/CERES
400 CREAM (GRAM) TOPICAL DAILY
Qty: 90 TABLET | Refills: 3 | Status: SHIPPED | OUTPATIENT
Start: 2022-08-02

## 2022-08-11 ENCOUNTER — HOSPITAL ENCOUNTER (OUTPATIENT)
Dept: GENERAL RADIOLOGY | Age: 62
Discharge: HOME | End: 2022-08-11
Payer: MEDICARE

## 2022-08-11 DIAGNOSIS — Z78.0 POST-MENOPAUSAL: ICD-10-CM

## 2022-08-11 PROCEDURE — 77080 DXA BONE DENSITY AXIAL: CPT

## 2022-10-10 ENCOUNTER — OFFICE VISIT (OUTPATIENT)
Dept: RHEUMATOLOGY | Age: 62
End: 2022-10-10
Payer: MEDICARE

## 2022-10-10 VITALS — SYSTOLIC BLOOD PRESSURE: 110 MMHG | DIASTOLIC BLOOD PRESSURE: 80 MMHG | WEIGHT: 105 LBS | BODY MASS INDEX: 18.02 KG/M2

## 2022-10-10 DIAGNOSIS — M15.9 PRIMARY OSTEOARTHRITIS INVOLVING MULTIPLE JOINTS: ICD-10-CM

## 2022-10-10 DIAGNOSIS — M81.0 AGE-RELATED OSTEOPOROSIS WITHOUT CURRENT PATHOLOGICAL FRACTURE: Primary | ICD-10-CM

## 2022-10-10 DIAGNOSIS — M81.0 AGE-RELATED OSTEOPOROSIS WITHOUT CURRENT PATHOLOGICAL FRACTURE: ICD-10-CM

## 2022-10-10 LAB
CALCIUM SERPL-MCNC: 9.3 MG/DL (ref 8.3–10.6)
CREAT SERPL-MCNC: 0.7 MG/DL (ref 0.6–1.2)
GFR AFRICAN AMERICAN: >60
GFR NON-AFRICAN AMERICAN: >60

## 2022-10-10 PROCEDURE — G8427 DOCREV CUR MEDS BY ELIG CLIN: HCPCS | Performed by: INTERNAL MEDICINE

## 2022-10-10 PROCEDURE — 4004F PT TOBACCO SCREEN RCVD TLK: CPT | Performed by: INTERNAL MEDICINE

## 2022-10-10 PROCEDURE — G8419 CALC BMI OUT NRM PARAM NOF/U: HCPCS | Performed by: INTERNAL MEDICINE

## 2022-10-10 PROCEDURE — G8484 FLU IMMUNIZE NO ADMIN: HCPCS | Performed by: INTERNAL MEDICINE

## 2022-10-10 PROCEDURE — 99214 OFFICE O/P EST MOD 30 MIN: CPT | Performed by: INTERNAL MEDICINE

## 2022-10-10 PROCEDURE — 3017F COLORECTAL CA SCREEN DOC REV: CPT | Performed by: INTERNAL MEDICINE

## 2022-10-10 RX ORDER — SODIUM CHLORIDE 0.9 % (FLUSH) 0.9 %
5-40 SYRINGE (ML) INJECTION PRN
Status: CANCELLED | OUTPATIENT
Start: 2022-10-10

## 2022-10-10 RX ORDER — ZOLEDRONIC ACID 5 MG/100ML
5 INJECTION, SOLUTION INTRAVENOUS ONCE
Status: CANCELLED | OUTPATIENT
Start: 2022-10-10 | End: 2022-10-10

## 2022-10-10 RX ORDER — DIPHENHYDRAMINE HYDROCHLORIDE 50 MG/ML
50 INJECTION INTRAMUSCULAR; INTRAVENOUS
Status: CANCELLED | OUTPATIENT
Start: 2022-10-10

## 2022-10-10 RX ORDER — ONDANSETRON 2 MG/ML
8 INJECTION INTRAMUSCULAR; INTRAVENOUS
Status: CANCELLED | OUTPATIENT
Start: 2022-10-10

## 2022-10-10 RX ORDER — 0.9 % SODIUM CHLORIDE 0.9 %
250 INTRAVENOUS SOLUTION INTRAVENOUS ONCE
Status: CANCELLED | OUTPATIENT
Start: 2022-10-10 | End: 2022-10-10

## 2022-10-10 RX ORDER — FAMOTIDINE 10 MG/ML
20 INJECTION, SOLUTION INTRAVENOUS
Status: CANCELLED | OUTPATIENT
Start: 2022-10-10

## 2022-10-10 RX ORDER — HEPARIN SODIUM (PORCINE) LOCK FLUSH IV SOLN 100 UNIT/ML 100 UNIT/ML
500 SOLUTION INTRAVENOUS PRN
Status: CANCELLED | OUTPATIENT
Start: 2022-10-10

## 2022-10-10 RX ORDER — SODIUM CHLORIDE 9 MG/ML
INJECTION, SOLUTION INTRAVENOUS ONCE
Status: CANCELLED | OUTPATIENT
Start: 2022-10-10 | End: 2022-10-10

## 2022-10-10 RX ORDER — SODIUM CHLORIDE 9 MG/ML
INJECTION, SOLUTION INTRAVENOUS CONTINUOUS
Status: CANCELLED | OUTPATIENT
Start: 2022-10-10

## 2022-10-10 RX ORDER — ALBUTEROL SULFATE 90 UG/1
4 AEROSOL, METERED RESPIRATORY (INHALATION) PRN
Status: CANCELLED | OUTPATIENT
Start: 2022-10-10

## 2022-10-10 RX ORDER — ACETAMINOPHEN 325 MG/1
650 TABLET ORAL
Status: CANCELLED | OUTPATIENT
Start: 2022-10-10

## 2022-10-10 RX ORDER — EPINEPHRINE 1 MG/ML
0.3 INJECTION, SOLUTION, CONCENTRATE INTRAVENOUS PRN
Status: CANCELLED | OUTPATIENT
Start: 2022-10-10

## 2022-10-10 RX ORDER — SODIUM CHLORIDE 9 MG/ML
5-250 INJECTION, SOLUTION INTRAVENOUS PRN
Status: CANCELLED | OUTPATIENT
Start: 2022-10-10

## 2022-10-10 NOTE — PROGRESS NOTES
not drink alcohol. She denies any back pain. She takes vitamin D 50,000 once a week. He also has pain, stiffness in joints which is worse in her right knee and right hand. Right knee cracks and pops. She has pain in the right knee going up and down the steps, standing from the sitting position. She has difficulty opening doorknobs and jars. She denies any swelling in the joints. She has morning stiffness lasting for few minutes. She has difficulty holding things with the hand. She denies any family history of rheumatoid arthritis. She takes Tylenol or over-the-counter Aleve as needed with some benefit. Osteoporosis Risk Factors   Nonmodifiable  Personal Hx of fracture as an adult: no  Hx of fracture in first-degree relative: no   race: yes  Advanced age: no  Female sex: yes  Dementia: no  Poor health/frailty: no     Potentially modifiable:  Tobacco use: yes -1-1/2 pack/day  Low body weight (<127 lbs): yes  Estrogen deficiency     early menopause (age <45) or bilateral ovariectomy: yes     prolonged premenopausal amenorrhea (>1 yr): no  Low calcium intake (lifelong): no  Alcoholism: no  Recurrent falls: no  Inadequate physical activity: no    Current calcium and Vit D intake:  Dietary sources: Unknown  Supplements: Unknown  Interim history: She presents for follow-up of osteoporosis and osteoarthritis. She received first Reclast infusion on June 23, 2020, second Reclast infusion September 1, 2021. She had repeat DEXA scan in August 2022 which was stable. She has not noticed any side effects. She denies recent fragility fractures. She takes calcium and vitamin D. She also has concomitant osteoarthritis. She has osteoarthritis in hands and knees. She denies any daily joint pain or swelling or stiffness. She takes over-the-counter Aleve or Advil as needed. Blood work came back negative for rheumatoid arthritis.       HPI  Review of Systems    REVIEW OF SYSTEMS: Positive for fatigue, osteoporosis, dry mouth, loss of taste, headaches, excessive worries, anxiety  Constitutional: No unanticipated weight loss or fevers. No fatigue and malaise. Integumentary: No rash, photosensitivity, malar rash, livedo reticularis, alopecia and Raynaud's symptoms, sclerodactyly, skin tightening  Eyes: negative for visual disturbance and persistent redness, discharge from eyes   ENT: - No tinnitus, loss of hearing, vertigo, or recurrent ear infections.  - No history of nasal/oral ulcers. - No history of dry eyes  Cardiovascular: No history of pericarditis, chest pain or murmur or palpitations  Respiratory: No history of interstitial lung disease. No history of pleurisy. No history of tuberculosis or atypical infections. Gastrointestinal: No history of heart burn, dysphagia or esophageal dysmotility. No change in bowel habits or any inflammatory bowel disease. Genitourinary: No history renal disease, miscarriages. Hematologic/Lymphatic: No abnormal bruising or bleeding, blood clots or swollen lymph nodes. Neurological: No history of seizure or focal weakness. No history of neuropathies, paresthesias or hyperesthesias, facial droop, diplopia  Psychiatric: No history of bipolar disease, depression  Endocrine: Denies any polyuria, polydipsia  Allergic/Immunologic: No nasal congestion or hives. I have reviewed patients Past medical History, Social History and Family History as mentioned in her chart and this remains unchanged fromprevious.     Past Medical History:   Diagnosis Date    Abnormal screening mammogram 7/31/2017    Dx right pending    Anxiety     Arthritis     Asthma     Cholelithiases 3/17/2021    Colon polyps 10/30/2017    Multiple path pending 10.2017  Recheck 10.2020    Depression     Headache 8/21/2019    History of 2019 novel coronavirus disease (COVID-19) 2/8/2022    History of abnormal mammogram 7/31/2017    birads 3 right recheck 8.2018 9.2019 recheck 1 year 10.2020 birad 1    Hx of adenomatous colonic polyps 10/30/2017    adenoma 10.2017  Recheck 10.2020    [facility] 3/11/2020    Multinodular thyroid 7/25/2017 7.2017 euthyroid recheck 1 year    Oral phase dysphagia 4/28/2021    Other osteoporosis without current pathological fracture 2/21/2020 2.2020  T-score of  -3.8 and a Z-score of -2.3.  spine; hip -3.0 and a Z-score of -1.7     Past Surgical History:   Procedure Laterality Date    TONSILLECTOMY      TUBAL LIGATION       Social History     Socioeconomic History    Marital status:       Spouse name: Not on file    Number of children: Not on file    Years of education: Not on file    Highest education level: Not on file   Occupational History    Not on file   Tobacco Use    Smoking status: Every Day     Packs/day: 1.00     Years: 50.00     Pack years: 50.00     Types: Cigarettes    Smokeless tobacco: Never    Tobacco comments:     cutting back now at 1/2 pack per day   Vaping Use    Vaping Use: Never used   Substance and Sexual Activity    Alcohol use: No    Drug use: No    Sexual activity: Yes     Partners: Male   Other Topics Concern    Not on file   Social History Narrative    Not on file     Social Determinants of Health     Financial Resource Strain: Low Risk     Difficulty of Paying Living Expenses: Not hard at all   Food Insecurity: No Food Insecurity    Worried About Running Out of Food in the Last Year: Never true    Ran Out of Food in the Last Year: Never true   Transportation Needs: Not on file   Physical Activity: Not on file   Stress: Not on file   Social Connections: Not on file   Intimate Partner Violence: Not on file   Housing Stability: Not on file     Family History   Problem Relation Age of Onset    COPD Father     Breast Cancer Sister     Heart Attack Brother      Vitals:    10/10/22 0753   BP: 110/80   Site: Left Upper Arm   Position: Sitting   Cuff Size: Medium Adult   Weight: 105 lb (47.6 kg)     Physical Exam  Constitutional:  Well developed, well nourished, no acute distress, non-toxic appearance   Musculoskeletal:    RIGHT  Swell  Tender  ROM  LEFT  Swell  Tender  ROM    DIP2  0  0   [place]  0  0   [place]   DIP3  0  0   [place]  0  0   [place]   DIP4  0  0   [place]  0  0   [place]   DIP5  0  0   [place]  0  0   [place]   PIP1  0  0   bony change  0  0   bony change   PIP2  0  0   bony change  0  0   bony change   PIP3  0  0   bony change  0  0   bony change   PIP4  0  0   bony change  0  0   bony change   PIP5  0  0   bony change  0  0   bony change   MCP1  0  0  FULL   0  0  FULL    MCP2  0  0  FULL   0  0  FULL    MCP3  0  0  FULL   0  0  FULL    MCP4  0  0  FULL   0  0  FULL    MCP5  0  0  FULL   0  0  FULL    Wrist  0  0  FULL   0  0  FULL    Elbow  0  0  FULL   0  0  FULL    Shouldr  0  0  FULL   0  0  FULL    Hip  0  0  FULL   0  0  FULL    Knee  0  0   crepitus  0  0   crepitus   Ankle  0  0  FULL   0  0  FULL    MTP1  0  0  FULL   0  0  FULL    MTP2  0  0  FULL   0  0  FULL    MTP3  0  0  FULL   0  0  FULL    MTP4  0  0  FULL   0  0  FULL    MTP5  0  0  FULL   0  0  FULL    IP1  0  0  FULL   0  0  FULL    IP2  0  0  FULL   0  0  FULL    IP3  0  0  FULL   0  0  FULL    IP4  0  0  FULL   0  0  FULL    IP5  0  0  FULL   0 0 FULL       Ambulates without assistance, normal gait  Neck: Full ROM, no tenderness,supple   Back- no tenderness. Eyes:  PERRL, extra ocular movements intact, conjunctiva normal   HEENT:  Atraumatic, normocephalic, external ears normal, oropharynx moist, no pharyngeal exudates. Respiratory:  No respiratory distress  GI:  Soft, nondistended, normal bowel sounds, nontender, noorganomegaly, no mass, no rebound, no guarding   :  No costovertebral angle tenderness   Integument:  Well hydrated, no rash or telangiectasias  Lymphatic:  No lymphadenopathy noted   Neurologic:   Alert & oriented x 3, CN 2-12 normal, no focal deficits noted. Sensations Intact.  Muscle strength 5/5 proximallyand distally in upper and lower extremities.    Psychiatric:  Speech and behavior appropriate           LABS AND IMAGING  Outside data reviewed and in HPI    Lab Results   Component Value Date/Time    WBC 5.8 08/01/2022 08:30 AM    RBC 3.86 08/01/2022 08:30 AM    HGB 12.1 08/01/2022 08:30 AM    HCT 35.2 08/01/2022 08:30 AM     08/01/2022 08:30 AM    MCV 91.1 08/01/2022 08:30 AM    MCH 31.3 08/01/2022 08:30 AM    MCHC 34.4 08/01/2022 08:30 AM    RDW 13.6 08/01/2022 08:30 AM    NRBC 1 02/08/2022 04:56 AM    SEGSPCT 63.2 05/23/2013 08:33 AM    BANDSPCT 3 02/08/2022 04:56 AM    METASPCT 1 02/08/2022 04:56 AM    LYMPHOPCT 29.5 02/09/2022 04:39 AM    MONOPCT 9.1 02/09/2022 04:39 AM    BASOPCT 0.3 02/09/2022 04:39 AM    MONOSABS 0.4 02/09/2022 04:39 AM    LYMPHSABS 1.3 02/09/2022 04:39 AM    EOSABS 0.0 02/09/2022 04:39 AM    BASOSABS 0.0 02/09/2022 04:39 AM    DIFFTYPE Auto-K 05/23/2013 08:33 AM       Chemistry        Component Value Date/Time     08/01/2022 0830    K 4.2 08/01/2022 0830    K 4.2 02/09/2022 0439     08/01/2022 0830    CO2 27 08/01/2022 0830    BUN 8 08/01/2022 0830    CREATININE 0.5 (L) 08/01/2022 0830        Component Value Date/Time    CALCIUM 8.6 08/01/2022 0830    ALKPHOS 96 08/01/2022 0830    AST 17 08/01/2022 0830    ALT 11 08/01/2022 0830    BILITOT 0.3 08/01/2022 0830          Lab Results   Component Value Date    SEDRATE 6 02/07/2022     Lab Results   Component Value Date    CRP 6.3 (H) 02/07/2022     No results found for: SANDRA, JHONNY, SSA, SSB, C3, C4  Lab Results   Component Value Date/Time    RF 26.0 05/11/2020 10:06 AM     No results found for: SANDRA, ANATITER, ANAINT, PATH  No results found for: DSDNAG, DSDNAIGGIFA  No results found for: SSAROAB, SSALAAB  No results found for: SMAB, RNPAB  No results found for: CENTABIGG  No results found for: C3, C4, ACE  Lab Results   Component Value Date/Time    VITD25 61.6 08/01/2022 08:30 AM     No results found for: Roderick Oliveira, bones are intact. The joint spaces are preserved. No joint effusion is   seen. Minimal atherosclerotic vascular calcifications are present. Right hand: The bones are intact. Minimal degenerative change 1st carpometacarpal joint   is present. No marginal erosions are seen. No chondrocalcinosis is noted. Mild degenerative spurring at the DIP joint right index finger is noted. Impression   No acute osseous injury of either knee or hand. Osteoarthritis of both hands as noted above. DEXA scan February 2020  Lumbar spine T score -3.8, left femoral neck T score -3 and right femoral neck T score -3    DEXA scan 8/1/2022  Lumbar spine T score -3.5 [5.7% increase], left femoral neck T score -2.7 [no significant change] and right femoral neck T score -2.6 [no significant change]    ASSESSMENT AND PLAN      Assessment/Plan:      ASSESSMENT:    1. Age-related osteoporosis without current pathological fracture    2. Primary osteoarthritis involving multiple joints        PLAN:    Diagnosis Orders   1. Age-related osteoporosis without current pathological fracture  Creatinine    Calcium      2. Primary osteoarthritis involving multiple joints           1. Age-related osteoporosis without current pathological fracture  DEXA scan February 2020 with T score of -3.8 at the lumbar spine, -3 at the right and left femoral neck. DEXA scan August 1, 2022-lumbar spine T score -3.5, left femoral neck T score -2.7 and right femoral neck T score -2. 6. No significant change compared to previous DEXA scan. No history of fragility fractures. No family history of fractures. TSH, PTH vitamin D normal  Received first Reclast infusion June 23, 2020   Second Reclast infusion September 1, 2021  Overdue for Reclast infusion. Will place an order. Continue calcium with vitamin D      2. Primary osteoarthritis involving multiple joints  RF, CCP negative, hand and knee x-rays with degenerative changes. Doubt rheumatoid arthritis  Stable  Naproxen as needed  Osteoarthritis/ Joint pain : Discussed about diagnosis and management of osteoarthritis. There are no FDA approved disease modifying agents. Goal is to control pain and increase mobility. Discussed the importance of wt loss, exercises, formal PT, joint braces/splints. First line of agent Tylenol arthritis, NSAIDs systemic and/ or topical,Cymbalta, pain meds, joint injections- steroids, and visco supplementaiton for knee OA. Also discussed about surgical options. The patient indicates understanding of these issues and agrees with the plan. Return in about 1 year (around 10/10/2023). The risks and benefits of my recommendations, as well as other treatment options, benefits and side effects werediscussed with the patient. All questions were answered. ######################################################################    I thank you for giving me theopportunity to participate in [facility]. If you have any questions or concerns please feel free to contact me. I look forward to following  Crsi Avalos along with you. Electronically signed by: Sadiq Murillo MD, MD, 10/10/2022 8:27 AM    Documentation was done using voice recognition dragon software. Every effort was made to ensure accuracy;however, inadvertent unintentional computerized transcription errors may be present.

## 2022-10-14 ENCOUNTER — HOSPITAL ENCOUNTER (OUTPATIENT)
Dept: ONCOLOGY | Age: 62
Setting detail: INFUSION SERIES
Discharge: HOME | End: 2022-10-14
Payer: MEDICARE

## 2022-10-14 VITALS
TEMPERATURE: 97.4 F | OXYGEN SATURATION: 99 % | DIASTOLIC BLOOD PRESSURE: 77 MMHG | HEART RATE: 79 BPM | RESPIRATION RATE: 16 BRPM | SYSTOLIC BLOOD PRESSURE: 126 MMHG

## 2022-10-14 DIAGNOSIS — M81.8 OTHER OSTEOPOROSIS WITHOUT CURRENT PATHOLOGICAL FRACTURE: Primary | ICD-10-CM

## 2022-10-14 PROCEDURE — 2580000003 HC RX 258: Performed by: INTERNAL MEDICINE

## 2022-10-14 PROCEDURE — 6360000002 HC RX W HCPCS: Performed by: INTERNAL MEDICINE

## 2022-10-14 PROCEDURE — 99211 OFF/OP EST MAY X REQ PHY/QHP: CPT

## 2022-10-14 PROCEDURE — 96365 THER/PROPH/DIAG IV INF INIT: CPT

## 2022-10-14 RX ORDER — SODIUM CHLORIDE 9 MG/ML
INJECTION, SOLUTION INTRAVENOUS ONCE
Status: COMPLETED | OUTPATIENT
Start: 2022-10-14 | End: 2022-10-14

## 2022-10-14 RX ORDER — SODIUM CHLORIDE 9 MG/ML
INJECTION, SOLUTION INTRAVENOUS ONCE
Status: CANCELLED | OUTPATIENT
Start: 2022-10-14 | End: 2022-10-14

## 2022-10-14 RX ORDER — SODIUM CHLORIDE 9 MG/ML
INJECTION, SOLUTION INTRAVENOUS CONTINUOUS
Status: CANCELLED | OUTPATIENT
Start: 2022-10-14

## 2022-10-14 RX ORDER — 0.9 % SODIUM CHLORIDE 0.9 %
250 INTRAVENOUS SOLUTION INTRAVENOUS ONCE
Status: DISCONTINUED | OUTPATIENT
Start: 2022-10-14 | End: 2022-10-14

## 2022-10-14 RX ORDER — SODIUM CHLORIDE 0.9 % (FLUSH) 0.9 %
5-40 SYRINGE (ML) INJECTION PRN
Status: CANCELLED | OUTPATIENT
Start: 2022-10-14

## 2022-10-14 RX ORDER — SODIUM CHLORIDE 0.9 % (FLUSH) 0.9 %
5-40 SYRINGE (ML) INJECTION PRN
Status: DISCONTINUED | OUTPATIENT
Start: 2022-10-14 | End: 2022-10-14

## 2022-10-14 RX ORDER — 0.9 % SODIUM CHLORIDE 0.9 %
250 INTRAVENOUS SOLUTION INTRAVENOUS ONCE
Status: CANCELLED | OUTPATIENT
Start: 2022-10-14 | End: 2022-10-14

## 2022-10-14 RX ORDER — ZOLEDRONIC ACID 5 MG/100ML
5 INJECTION, SOLUTION INTRAVENOUS ONCE
Status: CANCELLED | OUTPATIENT
Start: 2022-10-14 | End: 2022-10-14

## 2022-10-14 RX ORDER — HEPARIN SODIUM (PORCINE) LOCK FLUSH IV SOLN 100 UNIT/ML 100 UNIT/ML
500 SOLUTION INTRAVENOUS PRN
Status: CANCELLED | OUTPATIENT
Start: 2022-10-14

## 2022-10-14 RX ORDER — SODIUM CHLORIDE 9 MG/ML
5-250 INJECTION, SOLUTION INTRAVENOUS PRN
Status: CANCELLED | OUTPATIENT
Start: 2022-10-14

## 2022-10-14 RX ORDER — DIPHENHYDRAMINE HYDROCHLORIDE 50 MG/ML
50 INJECTION INTRAMUSCULAR; INTRAVENOUS
Status: CANCELLED | OUTPATIENT
Start: 2022-10-14

## 2022-10-14 RX ORDER — ACETAMINOPHEN 325 MG/1
650 TABLET ORAL
Status: CANCELLED | OUTPATIENT
Start: 2022-10-14

## 2022-10-14 RX ORDER — ONDANSETRON 2 MG/ML
8 INJECTION INTRAMUSCULAR; INTRAVENOUS
Status: CANCELLED | OUTPATIENT
Start: 2022-10-14

## 2022-10-14 RX ORDER — ALBUTEROL SULFATE 90 UG/1
4 AEROSOL, METERED RESPIRATORY (INHALATION) PRN
Status: CANCELLED | OUTPATIENT
Start: 2022-10-14

## 2022-10-14 RX ORDER — ZOLEDRONIC ACID 5 MG/100ML
5 INJECTION, SOLUTION INTRAVENOUS ONCE
Status: COMPLETED | OUTPATIENT
Start: 2022-10-14 | End: 2022-10-14

## 2022-10-14 RX ADMIN — SODIUM CHLORIDE, PRESERVATIVE FREE 10 ML: 5 INJECTION INTRAVENOUS at 08:39

## 2022-10-14 RX ADMIN — SODIUM CHLORIDE: 9 INJECTION, SOLUTION INTRAVENOUS at 08:38

## 2022-10-14 RX ADMIN — ZOLEDRONIC ACID 5 MG: 0.05 INJECTION, SOLUTION INTRAVENOUS at 08:39

## 2022-10-14 NOTE — PROGRESS NOTES
Patient ambulatory to Infusion center for Reclast infusion. Patient tolerated well. VSS. AVS with complications, signs and symptoms given. Positive teach back received. Patient discharged will follow up with physician.

## 2022-10-14 NOTE — DISCHARGE INSTRUCTIONS
zoledronic acid  Pronunciation:  JENNIE Espinoza ik AS id  Brand:  Reclast, Zometa  What is the most important information I should know about zoledronic acid? Do not use if you are pregnant. Use effective birth control, and ask your doctor how long to prevent pregnancy after you stop using zoledronic acid. Zoledronic acid can cause serious kidney problems,  especially if you are dehydrated, if you take diuretic medicine, or if you already have kidney disease. Call your doctor if you urinate less than usual, if you have swelling in your feet or ankles, or if you feel tired or short of breath. Also call your doctor if you have muscle spasms, numbness or tingling (in hands and feet or around the mouth), new or unusual hip pain, or severe pain in your joints, bones, or muscles. What is zoledronic acid? Reclast and Zometa are two different brands of zoledronic acid. Reclast is used to treat or prevent osteoporosis caused by menopause, or steroid use. This medicine also increases bone mass in men with osteoporosis. Reclast is for use when you have a high risk of bone fracture. Reclast is also used to treat Paget's disease of bone. Zometa is used to treat high blood levels of calcium caused by cancer (also called hypercalcemia of malignancy). This medicine also treats multiple myeloma (a type of bone marrow cancer) or bone cancer that has spread from elsewhere in the body. You should not use Reclast and Zometa at the same time. Zoledronic acid may also be used for purposes not listed in this medication guide. What should I discuss with my healthcare provider before receiving zoledronic acid? You should not be treated with zoledronic acid if you are allergic to it. You also should not receive Reclast if you have:  low levels of calcium in your blood (hypocalcemia); or  severe kidney disease.   You should not be treated with zoledronic acid if are currently using any other bisphosphonate (such as alendronate, etidronate, ibandronate, pamidronate, risedronate, or tiludronate). Tell your doctor if you have ever had:  kidney disease;  hypocalcemia;  thyroid or parathyroid surgery;  surgery to remove part of your intestine;  asthma caused by taking aspirin;  any condition that makes it hard for your body to absorb nutrients from food (malabsorption);  a dental problem (you may need a dental exam before you receive zoledronic acid);  if you are dehydrated; or  if you take a diuretic or \"water pill\". Zoledronic acid can cause serious kidney problems,  especially if you are dehydrated, if you take diuretic medicine, or if you already have kidney disease. This medicine may cause jaw bone problems (osteonecrosis). The risk is highest in people with cancer, blood cell disorders, pre-existing dental problems, or people treated with steroids, chemotherapy, or radiation. Ask your doctor about your own risk. You may need to have a negative pregnancy test before starting this treatment. Do not use if you are pregnant. This medicine may harm an unborn baby or cause birth defects. Zoledronic acid can remain in your body for weeks or years after your last dose. Use effective birth control to prevent pregnancy while using this medicine. Talk with your doctor about the need to prevent pregnancy after you stop using zoledronic acid. This medicine may affect fertility (ability to have children) in women. However, it is important to use birth control to prevent pregnancy because zoledronic acid can harm an unborn baby. You should not breastfeed while using zoledronic acid. How is zoledronic acid given? Zoledronic acid is given as an infusion into a vein. A healthcare provider will give you this injection. Zoledronic acid is sometimes given as a single dose only one time. It may also be given once every 1 or 2 years. How often you receive zoledronic acid will depend on why you are using this medicine.  Follow your doctor's instructions. Drink at least 2 glasses of water within a few hours before your injection to keep from getting dehydrated. You will need frequent medical tests. Pay special attention to your dental hygiene while using zoledronic acid. Brush and floss your teeth regularly. If you need to have any dental work (especially surgery), tell the dentist ahead of time that you are using zoledronic acid. Zoledronic acid is only part of a complete program of treatment that may also include diet changes and taking calcium and vitamin supplements. Follow your doctor's instructions very closely. Your doctor will determine how long to treat you with this medicine. Zoledronic acid is often given for only 3 to 5 years. What happens if I miss a dose? Call your doctor for instructions if you miss an appointment for your zoledronic acid injection. What happens if I overdose? Seek emergency medical attention or call the Poison Help line at [phone]. What should I avoid while receiving zoledronic acid? Avoid smoking, or try to quit. Smoking can reduce your bone mineral density, making fractures more likely. Avoid drinking large amounts of alcohol. Heavy drinking can also cause bone loss. What are the possible side effects of zoledronic acid? Get emergency medical help if you have signs of an allergic reaction: hives; wheezing, chest tightness, trouble breathing; swelling of your face, lips, tongue, or throat.   Call your doctor at once if you have:  new or unusual pain in your thigh or hip;  jaw pain or numbness, red or swollen gums, loose teeth, or slow healing after dental work;  severe joint, bone, or muscle pain;  kidney problems --little or no urination, swelling in your feet or ankles, feeling tired;  low red blood cells (anemia) --pale skin, unusual tiredness, feeling light-headed or short of breath, cold hands and feet; or  low calcium levels --muscle spasms or contractions, numbness or tingly feeling (around your mouth, or in your fingers and toes). Serious side effects on the kidneys may be more likely in older adults. Common side effects may include:  trouble breathing;  nausea, vomiting, diarrhea, constipation;  bone pain, muscle or joint pain;  fever or other flu symptoms;  tiredness;  eye pain or swelling;  pain in your arms or legs;  headache; or  anemia. This is not a complete list of side effects and others may occur. Call your doctor for medical advice about side effects. You may report side effects to FDA at 6-438-FDA-6157. What other drugs will affect zoledronic acid? Zoledronic acid can harm your kidneys, especially if you also use certain medicines for infections, cancer, osteoporosis, organ transplant rejection, bowel disorders, high blood pressure, or pain or arthritis (including Advil, Motrin, and Aleve). Other drugs may affect zoledronic acid, including prescription and over-the-counter medicines, vitamins, and herbal products. Tell your doctor about all your current medicines and any medicine you start or stop using. Where can I get more information? Your doctor or pharmacist can provide more information about zoledronic acid. Remember, keep this and all other medicines out of the reach of children, never share your medicines with others, and use this medication only for the indication prescribed. Every effort has been made to ensure that the information provided by [facility] [address] is accurate, up-to-date, and complete, but no guarantee is made to that effect. Drug information contained herein may be time sensitive. [facility] information has been compiled for use by healthcare practitioners and consumers in the [place] and therefore [facility] does not warrant that uses outside of the [place] are appropriate, unless specifically indicated otherwise. [facility] drug information does not endorse drugs, diagnose patients or recommend therapy.  [facility] drug information is an informational resource designed to assist licensed healthcare practitioners in caring for their patients and/or to serve consumers viewing this service as a supplement to, and not a substitute for, the expertise, skill, knowledge and judgment of healthcare practitioners. The absence of a warning for a given drug or drug combination in no way should be construed to indicate that the drug or drug combination is safe, effective or appropriate for any given patient. [facility] does not assume any responsibility for any aspect of healthcare administered with the aid of information [facility] provides. The information contained herein is not intended to cover all possible uses, directions, precautions, warnings, drug interactions, allergic reactions, or adverse effects. If you have questions about the drugs you are taking, check with your doctor, nurse or pharmacist.  Copyright 0522-6038 167  Yonis: 18.01. Revision date: 2/26/2021. Care instructions adapted under license by [facility] ([facility]). If you have questions about a medical condition or this instruction, always ask your healthcare professional. [place] [address] any warranty or liability for your use of this information.

## 2022-12-07 ENCOUNTER — OFFICE VISIT (OUTPATIENT)
Dept: PULMONOLOGY | Age: 62
End: 2022-12-07
Payer: MEDICARE

## 2022-12-07 VITALS
DIASTOLIC BLOOD PRESSURE: 80 MMHG | OXYGEN SATURATION: 97 % | HEART RATE: 88 BPM | SYSTOLIC BLOOD PRESSURE: 130 MMHG | TEMPERATURE: 97.7 F | HEIGHT: 64 IN | RESPIRATION RATE: 21 BRPM | BODY MASS INDEX: 18.16 KG/M2 | WEIGHT: 106.4 LBS

## 2022-12-07 DIAGNOSIS — Z72.0 TOBACCO ABUSE: ICD-10-CM

## 2022-12-07 DIAGNOSIS — Z87.891 PERSONAL HISTORY OF TOBACCO USE: ICD-10-CM

## 2022-12-07 DIAGNOSIS — J44.9 COPD, SEVERE (HCC): Primary | ICD-10-CM

## 2022-12-07 DIAGNOSIS — R91.1 LUNG NODULE: ICD-10-CM

## 2022-12-07 PROCEDURE — 99214 OFFICE O/P EST MOD 30 MIN: CPT | Performed by: INTERNAL MEDICINE

## 2022-12-07 PROCEDURE — G8427 DOCREV CUR MEDS BY ELIG CLIN: HCPCS | Performed by: INTERNAL MEDICINE

## 2022-12-07 PROCEDURE — 3017F COLORECTAL CA SCREEN DOC REV: CPT | Performed by: INTERNAL MEDICINE

## 2022-12-07 PROCEDURE — G8419 CALC BMI OUT NRM PARAM NOF/U: HCPCS | Performed by: INTERNAL MEDICINE

## 2022-12-07 PROCEDURE — 3023F SPIROM DOC REV: CPT | Performed by: INTERNAL MEDICINE

## 2022-12-07 PROCEDURE — 4004F PT TOBACCO SCREEN RCVD TLK: CPT | Performed by: INTERNAL MEDICINE

## 2022-12-07 PROCEDURE — G8484 FLU IMMUNIZE NO ADMIN: HCPCS | Performed by: INTERNAL MEDICINE

## 2022-12-07 RX ORDER — ALBUTEROL SULFATE 90 UG/1
2 AEROSOL, METERED RESPIRATORY (INHALATION) EVERY 4 HOURS PRN
Qty: 3 EACH | Refills: 3 | Status: SHIPPED | OUTPATIENT
Start: 2022-12-07

## 2022-12-07 ASSESSMENT — COPD QUESTIONNAIRES
QUESTION1_COUGHFREQUENCY: 3
QUESTION8_ENERGYLEVEL: 2
QUESTION6_LEAVINGHOUSE: 0
QUESTION5_HOMEACTIVITIES: 0
QUESTION3_CHESTTIGHTNESS: 3
CAT_TOTALSCORE: 15
QUESTION4_WALKINCLINE: 2
QUESTION7_SLEEPQUALITY: 2
QUESTION2_CHESTPHLEGM: 3

## 2022-12-07 NOTE — PATIENT INSTRUCTIONS
Continue with inhalers    CT Chest in feb    Follow up in 6 months     Learning About Lung Cancer Screening  What is screening for lung cancer? Lung cancer screening is a way to find some lung cancers early, before a person has any symptoms of the cancer. Lung cancer screening may help those who have the highest risk for lung cancer--people age 48 and older who are or were heavy smokers. For most people, who aren't at increased risk, screening for lung cancer probably isn't helpful. Screening won't prevent cancer. And it may not find all lung cancers. Lung cancer screening may lower the risk of dying from lung cancer in a small number of people. How is it done? Lung cancer screening is done with a low-dose CT (computed tomography) scan. A CT scan uses X-rays, or radiation, to make detailed pictures of your body. Experts recommend that screening be done in medical centers that focus on finding and treating lung cancer. Who is screening recommended for? Lung cancer screening is recommended for people age 48 and older who are or were heavy smokers. That means people with a smoking history of at least 20 pack years. A pack year is a way to measure how heavy a smoker you are or were. To figure out your pack years, multiply how many packs a day on average (assuming 20 cigarettes per pack) you have smoked by how many years you have smoked. For example: If you smoked 1 pack a day for 20 years, that's 1 times 20. So you have a smoking history of 20 pack years. If you smoked 2 packs a day for 10 years, that's 2 times 10. So you have a smoking history of 20 pack years. Experts agree that screening is for people who have a high risk of lung cancer. But experts don't agree on what high risk means. Some say people age 48 or older with at least a 20-pack-year smoking history are high risk. Others say it's people age 54 or older with a 30-pack-year history.   To see if you could benefit from screening, first find out if you are at high risk for lung cancer. Your doctor can help you decide your lung cancer risk. What are the risks of screening? CT screening for lung cancer isn't perfect. It can show an abnormal result when it turns out there wasn't any cancer. This is called a false-positive result. This means you may need more tests to make sure you don't have cancer. These tests can be harmful and cause a lot of worry. These tests may include more CT scans and invasive testing like a lung biopsy. In a biopsy, the doctor takes a sample of tissue from inside your lung so it can be looked at under a microscope. A biopsy is the only way to tell if you have lung cancer. If the biopsy finds cancer, you and your doctor will have to decide how or whether to treat it. Some lung cancers found on CT scans are harmless and would not have caused a problem if they had not been found through screening. But because doctors can't tell which ones will turn out to be harmless, most will be treated. This means that you may get treatment--including surgery, radiation, or chemotherapy--that you don't need. There is a risk of damage to cells or tissue from being exposed to radiation, including the small amounts used in CTs, X-rays, and other medical tests. Over time, exposure to radiation may cause cancer and other health problems. But in most cases, the risk of getting cancer from being exposed to small amounts of radiation is low. It's not a reason to avoid these tests for most people. What are the benefits of screening? Your scan may be normal (negative). For some people who are at higher risk, screening lowers the chance of dying of lung cancer. How much and how long you smoked helps to determine your risk level. Screening can find some cancers early, when treatment may be more likely to work. What happens after screening? The results of your CT scan will be sent to your doctor.  Someone from your care team will explain the results of your scan and answer any questions you may have. If you need any follow-up, he or she will help you understand what to do next. After a lung cancer screening, you can go back to your usual activities right away. A lung cancer screening test can't tell if you have lung cancer. If your results are positive, your doctor can't tell whether an abnormal finding is a harmless nodule, cancer, or something else without doing more tests. What can you do to help prevent lung cancer? Some lung cancers can't be prevented. But if you smoke, quitting smoking is the best step you can take to prevent lung cancer. If you want to quit, your doctor can recommend medicines or other ways to help. Follow-up care is a key part of your treatment and safety. Be sure to make and go to all appointments, and call your doctor if you are having problems. It's also a good idea to know your test results and keep a list of the medicines you take. Where can you learn more? Go to https://[employer] Â®.[employer]. org and sign in to your [employer] account. Enter K309 in the [employer] box to learn more about \"Learning About Lung Cancer Screening. \"     If you do not have an account, please click on the \"Sign Up Now\" link. Current as of: May 4, 2022               Content Version: 13.4  © 9038-1240 Healthwise, Incorporated. Care instructions adapted under license by [facility] ([facility]). If you have questions about a medical condition or this instruction, always ask your healthcare professional. [place] [address] any warranty or liability for your use of this information.

## 2022-12-07 NOTE — PROGRESS NOTES
Chief complaint  This is a 60y.o. year old female  who comes to see me with a chief complaint of   Chief Complaint   Patient presents with    Follow-up    COPD       HPI  Here with a cc of COPD. Had a cold recently and needed to use albuterol much more than baseline. Feeling better now but still short of breath. IT is better. On triple inhalers. Still working on quitting smoking. Has been weaning down. Plan is maybe to quit after her last box is used up.        Current Outpatient Medications:     albuterol sulfate HFA (PROAIR HFA) 108 (90 Base) MCG/ACT inhaler, Inhale 2 puffs into the lungs every 4 hours as needed for Wheezing or Shortness of Breath, Disp: 3 each, Rfl: 3    tiotropium (SPIRIVA RESPIMAT) 2.5 MCG/ACT AERS inhaler, Inhale 2 puffs into the lungs daily 2 puffs once daily, Disp: 3 each, Rfl: 3    magnesium oxide (MAG-OX) 400 (240 Mg) MG tablet, Take 1 tablet by mouth in the morning., Disp: 90 tablet, Rfl: 3    atorvastatin (LIPITOR) 80 MG tablet, TAKE ONE TABLET BY MOUTH DAILY, Disp: 90 tablet, Rfl: 0    tiotropium (SPIRIVA RESPIMAT) 2.5 MCG/ACT AERS inhaler, INHALE 2 PUFFS INTO THE LUNGS EVERY DAY, Disp: 3 each, Rfl: 3    albuterol sulfate HFA (VENTOLIN HFA) 108 (90 Base) MCG/ACT inhaler, Inhale 2 puffs into the lungs 4 times daily, Disp: 3 each, Rfl: 3    vitamin D (ERGOCALCIFEROL) 1.25 MG (86182 UT) CAPS capsule, TAKE ONE CAPSULE BY MOUTH EVERY 2 WEEKS, Disp: 6 capsule, Rfl: 3    Fluticasone furoate-vilanterol (BREO ELLIPTA) 200-25 MCG/INH AEPB inhaler, Inhale 1 puff into the lungs daily One puff daily, Disp: 3 each, Rfl: 3    aspirin (ASPIRIN CHILDRENS) 81 MG chewable tablet, Take 1 tablet by mouth daily, Disp: 30 tablet, Rfl: 3    albuterol (PROVENTIL) (2.5 MG/3ML) 0.083% nebulizer solution, Take 3 mLs by nebulization every 4 hours as needed for Wheezing, Disp: 120 mL, Rfl: 5    ezetimibe (ZETIA) 10 MG tablet, Take 1 tablet by mouth daily In addition to atorvastatin for cholesterol, Disp: 90 tablet, Rfl: 0    calcium gluconate 500 MG tablet, Take 1 tablet by mouth daily, Disp: 30 tablet, Rfl: 5    PHYSICAL EXAM:  Vitals:    12/07/22 0816   BP: 130/80   Pulse: 88   Resp: 21   Temp: 97.7 °F (36.5 °C)   SpO2: 97%       GENERAL:  Thin, NAD  HEENT:  No scleral icterus, no conjunctival irritation  NECK:  No thyromegaly, no bruits  LYMPH:  No cervical or supraclavicular adenopathy  HEART:  Regular, rate. no murmurs  LUNGS: Diminished   ABDOMEN:  No distention, no organomegaly  EXTREMITIES:  No edema, no digital clubbing  NEURO:  No localizing deficits, CN II-XII intact    Pulmonary Function Testing  3/2018  My interpretation is severe obstruction with hyperinflation and reduced diffusing capacity. Bronchodilator response    Chest imaging from 2/22 is reviewed. My interpretation is diffuse emphysema stable RUL nodule    Alpha-1 Anti-trypsin levels:  113, Phenotype:  M1S       I reviewed the above labs and images    COPD Assessment Test    1. I never cough vs I cough all the time:  3  2. I have no phlegm vs I am completely full of phlegm. 3  3. My chest does not feel tight vs my chest feel vs tight. 3  4. Not breathless with inclines vs breathless with inclines. 2  5. Not limited with ADLs vs Very limited with ADLs. 0  6. Confidence leaving home vs no confidence.  0  7. I sleep soundly vs I don't sleep soundly. 2  8. I have lots of energy vs I have no energy. 2    TOTAL POINTS: 15  (17, 22, 30, 21)    Scoring:    A) >30. Very high impact on quality of life. Optimize therapy including rehab  B) >20. High impact on quality of life. C) 10-20. Medium impact on qualify of life  D) <10. Low impact on life  E)  5.  Upper limit of normal in health non-smoker      Assessment/Plan:  1. COPD, severe ([address]  S/p mild flare up treated with albuterol. Does not feel she needs prednisone. Continue with breo and spiriva. - albuterol sulfate HFA (PROAIR HFA) 108 (90 Base) MCG/ACT inhaler;  Inhale 2 puffs into the lungs every 4 hours as needed for Wheezing or Shortness of Breath  Dispense: 3 each; Refill: 3  - tiotropium (SPIRIVA RESPIMAT) 2.5 MCG/ACT AERS inhaler; Inhale 2 puffs into the lungs daily 2 puffs once daily  Dispense: 3 each; Refill: 3    2. Tobacco abuse  Working on quitting. Has made great strides in the past several years. Was more than 2 ppd    3. Lung nodule  Stable on repeated imaging and likely granuloma    4. Personal history of tobacco use  Due in Feb  - CT Lung Screen (Annual); Future    Follow up in 6 months    Pulmonary Rehab:  Declined in the past    Lung Cancer Screening CT:  2/22    Discussed with the patient the current USPSTF guidelines released March 9, 2021 for screening for lung cancer. For adults aged 48 to [de-identified] years who have a 20 pack-year smoking history and currently smoke or have quit within the past 15 years the grade B recommendation is to:  Screen for lung cancer with low-dose computed tomography (LDCT) every year. Stop screening once a person has not smoked for 15 years or has a health problem that limits life expectancy or the ability to have lung surgery. The patient  reports that she has been smoking cigarettes. She has a 25.00 pack-year smoking history. She has never used smokeless tobacco.. Discussed with patient the risks and benefits of screening, including over-diagnosis, false positive rate, and total radiation exposure. The patient currently exhibits no signs or symptoms suggestive of lung cancer. Discussed with patient the importance of compliance with yearly annual lung cancer screenings and willingness to undergo diagnosis and treatment if screening scan is positive. In addition, the patient was counseled regarding the importance of remaining smoke free and/or total smoking cessation.     Also reviewed the following if the patient has Medicare that as of February 10, 2022, Medicare only covers LDCT screening in patients aged 51-72 with at least a 20 pack-year smoking history who currently smoke or have quit in the last 15 years

## 2022-12-20 NOTE — PROGRESS NOTES
The below patient isbeing evaluated by a Virtual Visit (video visit) encounter to address concerns as mentioned above. A caregiver was present when appropriate. Due to this being a TeleHealth encounter (During JPOHR-73 public health emergency), evaluation of the following organ systems was limited: Vitals/Constitutional/EENT/Resp/CV/GI//MS/Neuro/Skin/Heme-Lymph-Imm. Pursuant to the emergency declaration under the [address] [place] [address], [address] [place] [address] authority and the [employer] Act, this Virtual Visit was conducted with patient's (and/or legal guardian's) consent, to reduce the patient's risk of exposure to COVID-19 and provide necessary medical care. The patient (and/or legal guardian) has also been advised to contact this office for worsening conditions or problems, and seek emergency medical treatment and/or call 911 if deemed necessary. Patient identification was verified at the start of the visit: Yes    Total time spent on this encounter: Not billed by time    Services were provided through a video synchronous discussion virtually to substitute for in-person clinic visit. Patient and provider were located at their individual homes. --Tara Clement MD on 4/28/2021 at 12:35 PM    An electronic signature was used to authenticate this note. HPI: Brett Gonzalez presents for evaluation and management of chough with chest    Health issues include grief, emphysema, insomnia, adenomatous polyp with recheck due in 2020, hyperlipidemia, goiter, tobacco addiction,  osteoporosis asymptomatic gallstones. Increased cough x months. Now the onset of chest tightness. No hemoptysis. No productive sputum. No palpitations. No GI symptoms. Eating and drinking. Making urine. Some dizziness. History of severe COPD. Continue to smoke. Positive Breo and Spiriva. As needed albuterol. Oximetry 97. No known exposures.   Has had 2 Covid vaccines. History of unprovoked PE in the past.  This feels nothing like that. Positive calcification of coronary arteries however no known heart disease. Is on Zetia Lipitor and aspirin. No history of pneumothorax. No lower extremity edema or decrease in activity. .  Otherwise feels like her normal self. Severe emphysema FEV1 of 1.4 in 2017. Has cut back to tobacco about half a pack a day from 4 packs a day. Positive Breo and Spiriva. No nebulizer. Immunized. Has had her Covid vaccine #2. Remote unprovoked PE. CTA stable 2020 with multi lobar emphysema. Hyperlipidemia without known cardiac disease although calcification of coronary arteries. Is on Zetia and Lipitor       Review of CT scan shows gallstones. No symptoms. Was unaware.     Dysphagia. Clement Fly History Heimlich maneuver with pill and intermittent choking. .  Multinodular goiter. Positive tobacco.  Modified barium swallow without aspiration. Recommended sitting upright with meals and taking meds with purée or liquid. She is not had any more difficulty. Treatment recommendations. Feels well with this. .         Osteoporosis.  Positive vitamin D supplementation.  Reclast injection June 2020 without adverse effects.  DEXA scan February 2020 with T score of -3.8 and lumbar -3 in the right and left femoral neck.  Normal PTH, thyroid and vitamin D.neg sprue.  Positive caffeine, tobacco, low BMI. 1 PPD.   Following with rheumatology.  Positive osteoarthritis.  No longer using nonsteroidal anti-inflammatories.     Underweight.  Decreased p.o. intake.  Starting you supplement daily. Siomara Valdez with daughter and son-in-law. Leah Graham have been supportive. Yola Harris sure she takes her Ensure and medications     Chronic headaches significantly improved.  MRI with microvascular changes only.  No longer using daily Motrin. .          41 years taken off ventilator December 2017.  + grief       Adenomatous polyp 2017 and negative upper endoscopy. Recheck colonoscopy due       Low vitamin D with appropriate supplementation. .  Positive tobacco , 1 ppd , caffeine 5 12 oz,, decrease weight. + osteoporosis.  No fractures.      Hyperlipidemia  down.  States using atorvastatin without difficulty.  No liver function abnormality.  No claudication or TIA symptoms.  No increased exercise intolerance.  No cardiac testing CTA with coronary calcifications.     Multinodular goiter.  Euthyroid.  No palpitations or stridor.      .        vaginal delivery without complication. No gestational diabetes or hypertension. Tubal ligation. Early menopause. Last Pap  was normal.  Sister with breast cancer.  Mammogram BI-RADS 2020        PMH:  Malik Woodson     PE  20's     pneumonia     Tubal      SH: moved into home with daughter and    + tobacco 4 packs daily down to 1.  No alcohol.  No recreational drugs.  Retired minimal exercise. .   2017 after prolonged illness.       FH 3 brother 3 sisters    + breast cancer in sister 2 mehul GSW     -colon, ovarian cancer     ROS: Decreased vision.  Eye exam upcoming 2019.  No concerns with memory.  No falls.  Edentulous.  Chronic shortness of breath and wheeze.  Is with pulmonary postnasal drip.  Depression anxiety.  Improved.  Insomnia proved. .  No palpitations chest pain claudication or TIA.  No breast masses.  Occasional dyspepsia.  Colonic polyps recheck due . Davis Aquino urinary concerns.  Occasional arthralgias.  Low BMI stable.  Wears sunscreen now.  Safe at home  Constitutional, ent, CV, respiratory, GI, , joint, skin, allergic and psychiatric ROS reviewed and negative except for above    Allergies   Allergen Reactions    Pcn [Penicillins]        Outpatient Medications Marked as Taking for the 21 encounter (Virtual Visit) with Miguel Garcia MD   Medication Sig Dispense Refill    predniSONE (DELTASONE) 20 MG tablet 2 po q day x 5 days then 1 po q day with food 15 tablet 0    azithromycin (ZITHROMAX) 250 MG tablet Take 2 tabs (500 mg) on Day 1, and take 1 tab (250 mg) on days 2 through 5. 1 packet 0    atorvastatin (LIPITOR) 80 MG tablet TAKE ONE TABLET BY MOUTH DAILY 90 tablet 0    tiotropium (SPIRIVA RESPIMAT) 2.5 MCG/ACT AERS inhaler INHALE 2 PUFFS INTO THE LUNGS EVERY DAY 3 Inhaler 1    Fluticasone furoate-vilanterol (BREO ELLIPTA) 200-25 MCG/INH AEPB inhaler Inhale 1 puff into the lungs daily One puff daily 3 each 1    ezetimibe (ZETIA) 10 MG tablet Take 1 tablet by mouth daily In addition to atorvastatin for cholesterol 90 tablet 0    vitamin D (ERGOCALCIFEROL) 1.25 MG (56099 UT) CAPS capsule 1 po q 2 weeks 6 capsule 3    aspirin 81 MG EC tablet Take 1 tablet by mouth daily 90 tablet 3    calcium gluconate 500 MG tablet Take 1 tablet by mouth daily 30 tablet 5    albuterol sulfate HFA (VENTOLIN HFA) 108 (90 Base) MCG/ACT inhaler Inhale 2 puffs into the lungs every 6 hours as needed for Wheezing 1 Inhaler 3             Past Medical History:   Diagnosis Date    Abnormal screening mammogram 7/31/2017    Dx right pending    Anxiety     Arthritis     Asthma     Cholelithiases 3/17/2021    Colon polyps 10/30/2017    Multiple path pending 10.2017  Recheck 10.2020    Depression     Headache 8/21/2019    History of abnormal mammogram 7/31/2017    birads 3 right recheck 8.2018 9.2019 recheck 1 year 10.2020 birad 1    Hx of adenomatous colonic polyps 10/30/2017    adenoma 10.2017  Recheck 10.2020    Multinodular thyroid 7/25/2017 7.2017 euthyroid recheck 1 year    Other osteoporosis without current pathological fracture 2/21/2020 2.2020  T-score of  -3.8 and a Z-score of -2.3.  spine; hip -3.0 and a Z-score of -1.7       Past Surgical History:   Procedure Laterality Date    TONSILLECTOMY      TUBAL LIGATION               Family History   Problem Relation Age of Onset    COPD Father     Breast Cancer Sister          Review of Systems      Chemistry Component Value Date/Time     12/29/2020 1330    K 4.0 12/29/2020 1330     12/29/2020 1330    CO2 26 12/29/2020 1330    BUN 10 12/29/2020 1330    CREATININE 0.6 12/29/2020 1333    CREATININE 0.6 12/29/2020 1330        Component Value Date/Time    CALCIUM 8.9 12/29/2020 1330    ALKPHOS 114 12/29/2020 1330    AST 30 12/29/2020 1330    ALT 22 12/29/2020 1330    BILITOT <0.2 12/29/2020 1330            NO vitals  as this was a virtual visit during cvod19 pandemic  Mildly tachypneic. Appropriate. Normal voice. No spastic cough. Oximetry 97. Well-groomed and appropriate    Lab Results   Component Value Date    WBC 9.1 12/29/2020    HGB 13.4 12/29/2020    HCT 39.3 12/29/2020    MCV 93.5 12/29/2020     12/29/2020     Lab Results   Component Value Date    LABA1C 5.4 07/20/2017     Lab Results   Component Value Date    .3 07/20/2017     Lab Results   Component Value Date    LABA1C 5.4 07/20/2017     No components found for: CHLPL  Lab Results   Component Value Date    TRIG 148 10/13/2020    TRIG 130 11/13/2019    TRIG 136 07/24/2018     Lab Results   Component Value Date    HDL 49 10/13/2020    HDL 61 (H) 11/13/2019    HDL 47 08/21/2019     Lab Results   Component Value Date    LDLCALC 155 (H) 10/13/2020    LDLCALC 172 (H) 11/13/2019    LDLCALC 149 (H) 08/21/2019     Lab Results   Component Value Date    LABVLDL 30 10/13/2020    LABVLDL 26 11/13/2019    LABVLDL 33 08/21/2019       Old labs and records reviewed or requested  Discussed past lab and studies with patient      Diagnosis Orders   1. COPD exacerbation (HCC)  azithromycin (ZITHROMAX) 250 MG tablet   2. Tobacco dependence     3. Panlobular emphysema ([address]     4. Oral phase dysphagia         prednisone  Exacerbation COPD. Prednisone if not improved Zithromax. Discussed possibility of nebulizer. Will check oximetry. If not improved or lower oximeter go to the emergency room for further evaluation.     Tobacco dependence discussed cessation. Dysphagia stable. We did not discuss osteoporosis depression adenomatous polyps or cholelithiasis today        No follow-ups on file. Diagnosis and treatment discussed.   Possible side effects of medication reviewed  Patients questions answered  Follow up understood  Pt aware if they are not contacted about any test results , this does not mean they are normal.  They should call Azathioprine Pregnancy And Lactation Text: This medication is Pregnancy Category D and isn't considered safe during pregnancy. It is unknown if this medication is excreted in breast milk.

## 2023-01-10 ENCOUNTER — TELEPHONE (OUTPATIENT)
Dept: CASE MANAGEMENT | Age: 63
End: 2023-01-10

## 2023-01-10 NOTE — TELEPHONE ENCOUNTER
Call to patient-no answer. Left message that I saw she was trying to quit smoking and offered our free classes. Asked for call back if interested in joining.

## 2023-01-19 ENCOUNTER — HOSPITAL ENCOUNTER (OUTPATIENT)
Dept: CT IMAGING | Age: 63
Discharge: HOME | End: 2023-01-19
Payer: MEDICARE

## 2023-01-19 DIAGNOSIS — Z87.891 PERSONAL HISTORY OF TOBACCO USE: ICD-10-CM

## 2023-01-19 PROCEDURE — 71271 CT THORAX LUNG CANCER SCR C-: CPT

## 2023-01-30 ENCOUNTER — OFFICE VISIT (OUTPATIENT)
Dept: FAMILY MEDICINE CLINIC | Age: 63
End: 2023-01-30
Payer: MEDICARE

## 2023-01-30 VITALS
WEIGHT: 110.2 LBS | BODY MASS INDEX: 18.36 KG/M2 | SYSTOLIC BLOOD PRESSURE: 133 MMHG | HEIGHT: 65 IN | HEART RATE: 90 BPM | OXYGEN SATURATION: 98 % | DIASTOLIC BLOOD PRESSURE: 76 MMHG | TEMPERATURE: 97.3 F | RESPIRATION RATE: 15 BRPM

## 2023-01-30 DIAGNOSIS — J44.9 COPD, SEVERE (HCC): ICD-10-CM

## 2023-01-30 DIAGNOSIS — R63.6 UNDERWEIGHT: ICD-10-CM

## 2023-01-30 DIAGNOSIS — Z12.11 SCREENING FOR COLON CANCER: ICD-10-CM

## 2023-01-30 DIAGNOSIS — I47.29 NSVT (NONSUSTAINED VENTRICULAR TACHYCARDIA): ICD-10-CM

## 2023-01-30 DIAGNOSIS — Z12.31 BREAST CANCER SCREENING BY MAMMOGRAM: Primary | ICD-10-CM

## 2023-01-30 DIAGNOSIS — E78.2 MIXED HYPERLIPIDEMIA: ICD-10-CM

## 2023-01-30 PROBLEM — F17.200 TOBACCO DEPENDENCE: Status: RESOLVED | Noted: 2017-07-25 | Resolved: 2023-01-30

## 2023-01-30 PROBLEM — E55.9 VITAMIN D DEFICIENCY: Status: RESOLVED | Noted: 2019-11-13 | Resolved: 2023-01-30

## 2023-01-30 PROBLEM — R10.13 DYSPEPSIA: Status: RESOLVED | Noted: 2017-07-25 | Resolved: 2023-01-30

## 2023-01-30 PROBLEM — R51.9 HEADACHE: Status: RESOLVED | Noted: 2019-08-21 | Resolved: 2023-01-30

## 2023-01-30 PROBLEM — F33.40 MDD (RECURRENT MAJOR DEPRESSIVE DISORDER) IN REMISSION (HCC): Status: RESOLVED | Noted: 2020-03-11 | Resolved: 2023-01-30

## 2023-01-30 PROBLEM — E78.01 FAMILIAL HYPERCHOLESTEROLEMIA: Status: RESOLVED | Noted: 2017-11-13 | Resolved: 2023-01-30

## 2023-01-30 PROBLEM — E46 MALNUTRITION, UNSPECIFIED TYPE (HCC): Status: RESOLVED | Noted: 2022-01-31 | Resolved: 2023-01-30

## 2023-01-30 PROBLEM — Z87.898 HISTORY OF ABNORMAL MAMMOGRAM: Status: RESOLVED | Noted: 2017-07-31 | Resolved: 2023-01-30

## 2023-01-30 PROCEDURE — G8427 DOCREV CUR MEDS BY ELIG CLIN: HCPCS | Performed by: FAMILY MEDICINE

## 2023-01-30 PROCEDURE — 3017F COLORECTAL CA SCREEN DOC REV: CPT | Performed by: FAMILY MEDICINE

## 2023-01-30 PROCEDURE — 99214 OFFICE O/P EST MOD 30 MIN: CPT | Performed by: FAMILY MEDICINE

## 2023-01-30 PROCEDURE — 3023F SPIROM DOC REV: CPT | Performed by: FAMILY MEDICINE

## 2023-01-30 PROCEDURE — 4004F PT TOBACCO SCREEN RCVD TLK: CPT | Performed by: FAMILY MEDICINE

## 2023-01-30 PROCEDURE — G8419 CALC BMI OUT NRM PARAM NOF/U: HCPCS | Performed by: FAMILY MEDICINE

## 2023-01-30 PROCEDURE — G8484 FLU IMMUNIZE NO ADMIN: HCPCS | Performed by: FAMILY MEDICINE

## 2023-01-30 RX ORDER — ATORVASTATIN CALCIUM 80 MG/1
TABLET, FILM COATED ORAL
Qty: 90 TABLET | Refills: 4 | Status: SHIPPED | OUTPATIENT
Start: 2023-01-30

## 2023-01-30 RX ORDER — MELATONIN
1000 DAILY
Qty: 90 TABLET | Refills: 1 | Status: SHIPPED | OUTPATIENT
Start: 2023-01-30

## 2023-01-30 ASSESSMENT — PATIENT HEALTH QUESTIONNAIRE - PHQ9
4. FEELING TIRED OR HAVING LITTLE ENERGY: 0
3. TROUBLE FALLING OR STAYING ASLEEP: 2
9. THOUGHTS THAT YOU WOULD BE BETTER OFF DEAD, OR OF HURTING YOURSELF: 0
1. LITTLE INTEREST OR PLEASURE IN DOING THINGS: 0
SUM OF ALL RESPONSES TO PHQ QUESTIONS 1-9: 2
7. TROUBLE CONCENTRATING ON THINGS, SUCH AS READING THE NEWSPAPER OR WATCHING TELEVISION: 0
SUM OF ALL RESPONSES TO PHQ QUESTIONS 1-9: 2
SUM OF ALL RESPONSES TO PHQ9 QUESTIONS 1 & 2: 0
2. FEELING DOWN, DEPRESSED OR HOPELESS: 0
10. IF YOU CHECKED OFF ANY PROBLEMS, HOW DIFFICULT HAVE THESE PROBLEMS MADE IT FOR YOU TO DO YOUR WORK, TAKE CARE OF THINGS AT HOME, OR GET ALONG WITH OTHER PEOPLE: 0
6. FEELING BAD ABOUT YOURSELF - OR THAT YOU ARE A FAILURE OR HAVE LET YOURSELF OR YOUR FAMILY DOWN: 0
8. MOVING OR SPEAKING SO SLOWLY THAT OTHER PEOPLE COULD HAVE NOTICED. OR THE OPPOSITE, BEING SO FIGETY OR RESTLESS THAT YOU HAVE BEEN MOVING AROUND A LOT MORE THAN USUAL: 0
5. POOR APPETITE OR OVEREATING: 0
SUM OF ALL RESPONSES TO PHQ QUESTIONS 1-9: 2
SUM OF ALL RESPONSES TO PHQ QUESTIONS 1-9: 2

## 2023-01-30 NOTE — PROGRESS NOTES
Chief Complaint: Established New Doctor       HPI: She is here to establish care. She has history of hyperlipidemia and she is on atorvastatin 80 mg daily. She has history of multinodular goiter currently no symptoms. She has a history of tobacco abuse and currently not smoking. She is not smoking since a month. Denies having any cravings and she has been coping. She has severe COPD and currently following up with pulmonologist and uses Spiriva daily and Breo Ellipta daily. She is underweight. She uses Ensure. She quotes that she has always been underweight. She denies having any symptoms of depression. She has gallstones which are asymptomatic. She is due for colonoscopy. She is due for GYN exam but declined. She has osteoporosis. And she is on Reclast injections. Follows up with Dr. Sterling Castle. She had ventricular nonsustained tachycardia. Currently no concerns. Currently no medications      Patient's problem list, medications, allergies, past medical, surgical, social and family histories were reviewed and updated as appropriate. Current Outpatient Medications   Medication Sig Dispense Refill    atorvastatin (LIPITOR) 80 MG tablet TAKE ONE TABLET BY MOUTH DAILY 90 tablet 4    vitamin D3 (CHOLECALCIFEROL) 25 MCG (1000 UT) TABS tablet Take 1 tablet by mouth daily 90 tablet 1    albuterol sulfate HFA (PROAIR HFA) 108 (90 Base) MCG/ACT inhaler Inhale 2 puffs into the lungs every 4 hours as needed for Wheezing or Shortness of Breath 3 each 3    tiotropium (SPIRIVA RESPIMAT) 2.5 MCG/ACT AERS inhaler Inhale 2 puffs into the lungs daily 2 puffs once daily 3 each 3    magnesium oxide (MAG-OX) 400 (240 Mg) MG tablet Take 1 tablet by mouth in the morning.  90 tablet 3    tiotropium (SPIRIVA RESPIMAT) 2.5 MCG/ACT AERS inhaler INHALE 2 PUFFS INTO THE LUNGS EVERY DAY 3 each 3    vitamin D (ERGOCALCIFEROL) 1.25 MG (01608 UT) CAPS capsule TAKE ONE CAPSULE BY MOUTH EVERY 2 WEEKS 6 capsule 3 Fluticasone furoate-vilanterol (BREO ELLIPTA) 200-25 MCG/INH AEPB inhaler Inhale 1 puff into the lungs daily One puff daily 3 each 3    aspirin (ASPIRIN CHILDRENS) 81 MG chewable tablet Take 1 tablet by mouth daily 30 tablet 3    albuterol (PROVENTIL) (2.5 MG/3ML) 0.083% nebulizer solution Take 3 mLs by nebulization every 4 hours as needed for Wheezing 120 mL 5    calcium gluconate 500 MG tablet Take 1 tablet by mouth daily 30 tablet 5     No current facility-administered medications for this visit. Social History     Tobacco Use    Smoking status: Every Day     Packs/day: 0.50     Years: 50.00     Pack years: 25.00     Types: Cigarettes    Smokeless tobacco: Never    Tobacco comments:     cutting back now at 1/2 pack per day. HX 2ppd   Substance Use Topics    Alcohol use: No            Review of Systems:  Constitutional: Negative for appetite change, fatigue, fever and unexpected weight change. HENT: Negative   Respiratory: Negative for cough, chest tightness, shortness of breath and wheezing. Cardiovascular: Negative for chest pain, palpitations and leg swelling. Gastrointestinal: Negative for abdominal pain, blood in stool, constipation, diarrhea, nausea and vomiting. Genitourinary: Negative for difficulty urinating, flank pain, frequency, hematuria and urgency. Musculoskeletal: Negative for gait problem, joint swelling and myalgias. Skin: Negative for color change, pallor, rash and wound. Neurological: Negative for dizziness, tremors, seizures, syncope, facial asymmetry, speech difficulty, weakness, light-headedness, numbness and headaches. Hematological: Negative. Psychiatric/Behavioral: Negative for agitation, confusion, self-injury, sleep disturbance and suicidal ideas. The patient is not nervous/anxious.         Objective:     Vitals:    01/30/23 0942   BP: 133/76   Pulse: 90   Resp: 15   Temp: 97.3 °F (36.3 °C)   TempSrc: Temporal   SpO2: 98%   Weight: 110 lb 3.2 oz (50 kg) Height: 5' 4.76\" (1.645 m)     Body mass index is 18.47 kg/m². Wt Readings from Last 3 Encounters:   01/30/23 110 lb 3.2 oz (50 kg)   12/07/22 106 lb 6.4 oz (48.3 kg)   10/10/22 105 lb (47.6 kg)     BP Readings from Last 3 Encounters:   01/30/23 133/76   12/07/22 130/80   10/14/22 126/77       Physical exam:  Constitutional: she is oriented to person, place, and time. she appears well-developed and well-nourished. No distress. HENT:   Head: Normocephalic. Right Ear: External ear normal. Normal TM   Left Ear: External ear normal. Normal TM  Nose: Nose normal.   Mouth/Throat: Oropharynx is clear and moist. No oropharyngeal exudate. Neck: Normal range of motion. No JVD present. No tracheal deviation present. No thyromegaly present. Cardiovascular: Normal rate, regular rhythm, normal heart sounds and intact distal pulses. No murmur heard. Pulmonary/Chest: Effort normal and breath sounds normal. No stridor. No respiratory distress. she has no wheezes. she has no rales. sheexhibits no tenderness. Abdominal: Soft. Bowel sounds are normal. she exhibits no distension and no mass. There is no tenderness. There is no rebound and no guarding. Musculoskeletal: Normal range of motion. she exhibits no edema, tenderness or deformity. Neurological:she is alert and oriented to person, place, and time. she  has normal reflexes. No cranial nerve deficit. Coordination normal.   Skin: Skin is warm and dry. No rash noted. she is not diaphoretic. No erythema. No pallor. Psychiatric: she has a normal mood and affect.  her   behavior is normal.           Health Maintenance   Topic Date Due    HIV screen  Never done    DTaP/Tdap/Td vaccine (1 - Tdap) Never done    Shingles vaccine (1 of 2) Never done    COVID-19 Vaccine (3 - Booster for Moderna series) 05/26/2021    Cervical cancer screen  07/20/2022    Flu vaccine (1) 08/01/2022    Breast cancer screen  12/16/2022    Depression Monitoring  01/31/2023    Annual Wellness Visit (AWV)  02/03/2023    Lipids  08/01/2023    Low dose CT lung screening  01/19/2024    Colorectal Cancer Screen  10/20/2027    Pneumococcal 0-64 years Vaccine  Completed    Hepatitis C screen  Completed    Hepatitis A vaccine  Aged Out    Hib vaccine  Aged Out    Meningococcal (ACWY) vaccine  Aged Out          Assessment/Plan:     1. Breast cancer screening by mammogram  - [facility] DIGITAL SCREEN W OR WO CAD BILATERAL; Future    2. Screening for colon cancer  - [facility] - Carmelina Batista MD, Gastroenterology (ERCP & EUS), [facility]    3. COPD, severe ([address]  Stable    4. NSVT (nonsustained ventricular tachycardia)  Currently no symptoms    5. Underweight  Working on diet    6.  Mixed hyperlipidemia  Refilled atorvastatin    Declined GYN exam    Alessandro Chandler MD  1/30/2023 10:24 AM

## 2023-02-23 ENCOUNTER — HOSPITAL ENCOUNTER (OUTPATIENT)
Dept: WOMENS IMAGING | Age: 63
Discharge: HOME | End: 2023-02-23
Payer: MEDICARE

## 2023-02-23 VITALS — BODY MASS INDEX: 18.78 KG/M2 | HEIGHT: 64 IN | WEIGHT: 110 LBS

## 2023-02-23 DIAGNOSIS — Z12.31 BREAST CANCER SCREENING BY MAMMOGRAM: ICD-10-CM

## 2023-02-23 PROCEDURE — 77067 SCR MAMMO BI INCL CAD: CPT

## 2023-02-28 ENCOUNTER — OFFICE VISIT (OUTPATIENT)
Dept: FAMILY MEDICINE CLINIC | Age: 63
End: 2023-02-28
Payer: MEDICARE

## 2023-02-28 VITALS
DIASTOLIC BLOOD PRESSURE: 68 MMHG | BODY MASS INDEX: 19.12 KG/M2 | SYSTOLIC BLOOD PRESSURE: 128 MMHG | OXYGEN SATURATION: 97 % | HEIGHT: 64 IN | HEART RATE: 102 BPM | WEIGHT: 112 LBS

## 2023-02-28 DIAGNOSIS — I47.29 NSVT (NONSUSTAINED VENTRICULAR TACHYCARDIA): ICD-10-CM

## 2023-02-28 DIAGNOSIS — F17.200 TOBACCO DEPENDENCE: Primary | ICD-10-CM

## 2023-02-28 DIAGNOSIS — Z23 NEED FOR VACCINATION: ICD-10-CM

## 2023-02-28 PROCEDURE — 99214 OFFICE O/P EST MOD 30 MIN: CPT | Performed by: NURSE PRACTITIONER

## 2023-02-28 PROCEDURE — 3017F COLORECTAL CA SCREEN DOC REV: CPT | Performed by: NURSE PRACTITIONER

## 2023-02-28 PROCEDURE — G8420 CALC BMI NORM PARAMETERS: HCPCS | Performed by: NURSE PRACTITIONER

## 2023-02-28 PROCEDURE — G8484 FLU IMMUNIZE NO ADMIN: HCPCS | Performed by: NURSE PRACTITIONER

## 2023-02-28 PROCEDURE — G8427 DOCREV CUR MEDS BY ELIG CLIN: HCPCS | Performed by: NURSE PRACTITIONER

## 2023-02-28 PROCEDURE — 4004F PT TOBACCO SCREEN RCVD TLK: CPT | Performed by: NURSE PRACTITIONER

## 2023-02-28 PROCEDURE — 90471 IMMUNIZATION ADMIN: CPT | Performed by: NURSE PRACTITIONER

## 2023-02-28 PROCEDURE — 90750 HZV VACC RECOMBINANT IM: CPT | Performed by: NURSE PRACTITIONER

## 2023-02-28 SDOH — ECONOMIC STABILITY: HOUSING INSECURITY
IN THE LAST 12 MONTHS, WAS THERE A TIME WHEN YOU DID NOT HAVE A STEADY PLACE TO SLEEP OR SLEPT IN A SHELTER (INCLUDING NOW)?: NO

## 2023-02-28 SDOH — ECONOMIC STABILITY: FOOD INSECURITY: WITHIN THE PAST 12 MONTHS, YOU WORRIED THAT YOUR FOOD WOULD RUN OUT BEFORE YOU GOT MONEY TO BUY MORE.: NEVER TRUE

## 2023-02-28 SDOH — ECONOMIC STABILITY: FOOD INSECURITY: WITHIN THE PAST 12 MONTHS, THE FOOD YOU BOUGHT JUST DIDN'T LAST AND YOU DIDN'T HAVE MONEY TO GET MORE.: NEVER TRUE

## 2023-02-28 SDOH — ECONOMIC STABILITY: INCOME INSECURITY: HOW HARD IS IT FOR YOU TO PAY FOR THE VERY BASICS LIKE FOOD, HOUSING, MEDICAL CARE, AND HEATING?: NOT VERY HARD

## 2023-02-28 ASSESSMENT — ENCOUNTER SYMPTOMS
SINUS PAIN: 0
SORE THROAT: 0
VOMITING: 0
COUGH: 0
SINUS PRESSURE: 0
DIARRHEA: 0
NAUSEA: 0
BLOOD IN STOOL: 0
SHORTNESS OF BREATH: 0
WHEEZING: 0
CONSTIPATION: 0
EYES NEGATIVE: 1
ABDOMINAL PAIN: 0
CHEST TIGHTNESS: 0

## 2023-02-28 ASSESSMENT — PATIENT HEALTH QUESTIONNAIRE - PHQ9
3. TROUBLE FALLING OR STAYING ASLEEP: 0
SUM OF ALL RESPONSES TO PHQ QUESTIONS 1-9: 0
9. THOUGHTS THAT YOU WOULD BE BETTER OFF DEAD, OR OF HURTING YOURSELF: 0
2. FEELING DOWN, DEPRESSED OR HOPELESS: 0
8. MOVING OR SPEAKING SO SLOWLY THAT OTHER PEOPLE COULD HAVE NOTICED. OR THE OPPOSITE, BEING SO FIGETY OR RESTLESS THAT YOU HAVE BEEN MOVING AROUND A LOT MORE THAN USUAL: 0
6. FEELING BAD ABOUT YOURSELF - OR THAT YOU ARE A FAILURE OR HAVE LET YOURSELF OR YOUR FAMILY DOWN: 0
SUM OF ALL RESPONSES TO PHQ QUESTIONS 1-9: 0
SUM OF ALL RESPONSES TO PHQ9 QUESTIONS 1 & 2: 0
7. TROUBLE CONCENTRATING ON THINGS, SUCH AS READING THE NEWSPAPER OR WATCHING TELEVISION: 0
10. IF YOU CHECKED OFF ANY PROBLEMS, HOW DIFFICULT HAVE THESE PROBLEMS MADE IT FOR YOU TO DO YOUR WORK, TAKE CARE OF THINGS AT HOME, OR GET ALONG WITH OTHER PEOPLE: 0
SUM OF ALL RESPONSES TO PHQ QUESTIONS 1-9: 0
4. FEELING TIRED OR HAVING LITTLE ENERGY: 0
SUM OF ALL RESPONSES TO PHQ QUESTIONS 1-9: 0
1. LITTLE INTEREST OR PLEASURE IN DOING THINGS: 0
5. POOR APPETITE OR OVEREATING: 0

## 2023-02-28 NOTE — PATIENT INSTRUCTIONS
Consider getting newest covid booster    Smoking Cessation Resources:  Call the [facility] Quitline at 2-169-60E-QUIT (6-828.356.9024) Monday through Friday from 9:00 a.m. to 9:00 p.m. or find your [facility] quitline by calling 9-030-QUIT-NOW (2-901.129.8130). 8-203.937.9236 - (hearing-impaired)    Stay Busy  Keep your mouth busy. Chew a stick of gum instead of picking up a cigarette. Keep hard candy with you. Drink more water. Do something else. When a craving hits, stop what you're doing immediately and switch to doing something different. Simply changing your routine might help you shake off a craving. Go for a walk or jog. Or go up and down the stairs a few times. Physical activity, even in short bursts, can help boost your energy and beat a craving. Take slow, deep breaths. Breathe through your craving. Inhale through your nose and exhale through your mouth. Repeat this 10 times or until you're feeling more relaxed.

## 2023-02-28 NOTE — PROGRESS NOTES
2023     Hellen Morris (:  1960) is a 58 y.o. female, here for evaluation of the following medical concerns:    Chief Complaint   Patient presents with    New Patient     Establish new pcp     Her provider retired, then saw Dr. Tor Soni last month, was told they are no longer accepting new patients and recommended that she come here for care. Smoking:  has been smoking for more than 50 years. She did quit recently for about one month but restarted. She is using 1/3 pack per day at this point. She is not sure she wants to quit at this time. She does recognize the risks of smoking and verbalized them today. VTACH:  had an episode of irregular heart beat in , was seen by PCP and then cardiology, left heart cath completed. Findings are below. No intervention was required. She has not had another episode of irregular heart beat since. Review of Systems   Constitutional:  Negative for chills, diaphoresis, fatigue and fever. HENT:  Negative for congestion, ear discharge, ear pain, sinus pressure, sinus pain and sore throat. Eyes: Negative. Respiratory:  Negative for cough, chest tightness, shortness of breath and wheezing. Cardiovascular:  Negative for chest pain, palpitations and leg swelling. Gastrointestinal:  Negative for abdominal pain, blood in stool, constipation, diarrhea, nausea and vomiting. Endocrine: Negative. Genitourinary: Negative. Musculoskeletal: Negative. Skin: Negative. Neurological:  Negative for dizziness, weakness and headaches. Psychiatric/Behavioral: Negative. Prior to Visit Medications    Medication Sig Taking?  Authorizing Provider   atorvastatin (LIPITOR) 80 MG tablet TAKE ONE TABLET BY MOUTH DAILY Yes Hailee Mcfarlane MD   vitamin D3 (CHOLECALCIFEROL) 25 MCG (1000 UT) TABS tablet Take 1 tablet by mouth daily Yes Hailee Mcfarlane MD   albuterol sulfate HFA (PROAIR HFA) 108 (90 Base) MCG/ACT inhaler Inhale 2 puffs into the lungs every 4 hours as needed for Wheezing or Shortness of Breath Yes Rambo Liang, DO   tiotropium (SPIRIVA RESPIMAT) 2.5 MCG/ACT AERS inhaler Inhale 2 puffs into the lungs daily 2 puffs once daily Yes Rambo Liagn, DO   magnesium oxide (MAG-OX) 400 (240 Mg) MG tablet Take 1 tablet by mouth in the morning. Yes Robby Sanabria MD   tiotropium (SPIRIVA RESPIMAT) 2.5 MCG/ACT AERS inhaler INHALE 2 PUFFS INTO THE LUNGS EVERY DAY Yes Rambo Liang DO   vitamin D (ERGOCALCIFEROL) 1.25 MG (75513 UT) CAPS capsule TAKE ONE CAPSULE BY MOUTH EVERY 2 WEEKS Yes Robby Sanabria MD   Fluticasone furoate-vilanterol (BREO ELLIPTA) 200-25 MCG/INH AEPB inhaler Inhale 1 puff into the lungs daily One puff daily Yes Rambo Liang DO   aspirin (ASPIRIN CHILDRENS) 81 MG chewable tablet Take 1 tablet by mouth daily Yes Elder Foster MD   albuterol (PROVENTIL) (2.5 MG/3ML) 0.083% nebulizer solution Take 3 mLs by nebulization every 4 hours as needed for Wheezing Yes Rambo Liang DO   calcium gluconate 500 MG tablet Take 1 tablet by mouth daily Yes Sisi Coleman MD      Social History     Tobacco Use    Smoking status: Every Day     Packs/day: 0.50     Years: 50.00     Pack years: 25.00     Types: Cigarettes    Smokeless tobacco: Never    Tobacco comments:     cutting back now at 1/2 pack per day. HX 2ppd   Vaping Use    Vaping Use: Never used   Substance Use Topics    Alcohol use: No    Drug use: No      Vitals:    02/28/23 1052   BP: 128/68   Pulse: (!) 102   SpO2: 97%   Weight: 112 lb (50.8 kg)   Height: 5' 4\" (1.626 m)     Estimated body mass index is 19.22 kg/m² as calculated from the following:    Height as of this encounter: 5' 4\" (1.626 m). Weight as of this encounter: 112 lb (50.8 kg). Physical Exam  Vitals and nursing note reviewed. Constitutional:       General: She is awake. She is not in acute distress. Appearance: Normal appearance.  She is well-developed, well-groomed and normal weight. She is not ill-appearing, toxic-appearing or diaphoretic.   HENT:      Head: Normocephalic and atraumatic.      Right Ear: Tympanic membrane, ear canal and external ear normal.      Left Ear: Tympanic membrane, ear canal and external ear normal.      Nose: Nose normal.      Mouth/Throat:      Lips: Pink.      Mouth: Mucous membranes are moist.      Pharynx: Oropharynx is clear.   Eyes:      Extraocular Movements: Extraocular movements intact.      Conjunctiva/sclera: Conjunctivae normal.      Pupils: Pupils are equal, round, and reactive to light.   Neck:      Thyroid: No thyroid mass, thyromegaly or thyroid tenderness.      Vascular: No carotid bruit or JVD.   Cardiovascular:      Rate and Rhythm: Normal rate and regular rhythm.      Heart sounds: Normal heart sounds, S1 normal and S2 normal. No murmur heard.  Pulmonary:      Effort: Pulmonary effort is normal.      Breath sounds: Decreased air movement present. Examination of the right-upper field reveals decreased breath sounds. Examination of the left-upper field reveals decreased breath sounds. Examination of the right-middle field reveals decreased breath sounds. Examination of the left-middle field reveals decreased breath sounds. Examination of the right-lower field reveals decreased breath sounds. Examination of the left-lower field reveals decreased breath sounds. Decreased breath sounds present. No wheezing, rhonchi or rales.   Abdominal:      General: Abdomen is flat. Bowel sounds are normal.      Palpations: Abdomen is soft.   Musculoskeletal:         General: Normal range of motion.      Cervical back: Normal range of motion and neck supple.      Right lower leg: No edema.      Left lower leg: No edema.   Lymphadenopathy:      Head:      Right side of head: No submental, submandibular, tonsillar, preauricular or posterior auricular adenopathy.      Left side of head: No submental, submandibular,  tonsillar, preauricular or posterior auricular adenopathy. Cervical: No cervical adenopathy. Upper Body:      Right upper body: No supraclavicular adenopathy. Left upper body: No supraclavicular adenopathy. Skin:     General: Skin is warm and dry. Capillary Refill: Capillary refill takes less than 2 seconds. Neurological:      General: No focal deficit present. Mental Status: She is alert and oriented to person, place, and time. GCS: GCS eye subscore is 4. GCS verbal subscore is 5. GCS motor subscore is 6. Psychiatric:         Attention and Perception: Attention normal.         Mood and Affect: Mood normal.         Speech: Speech normal.         Behavior: Behavior normal. Behavior is cooperative. Thought Content: Thought content normal.         Judgment: Judgment normal.     [facility] 8/2/2021  FINDINGS:  1. Right-dominant coronary arterial circulation. There is no  angiographic evidence of coronary atherosclerosis in the right coronary  artery. The left main and the circumflex arteries are free of disease. There is an angiographic 60% lesion in the mid left anterior descending  artery and a 50% lesion more distal to this. This was nonsignificant by  fractional flow reserve with a ratio of 0.85.  2.  Normal left ventricular end-diastolic pressure at 10 mmHg. 3.  Normal left ventricular systolic function. LV ejection fraction of  60%. 4.  No gradient across the aortic valve on pullback to suggest aortic  stenosis. ASSESSMENT/PLAN:  1.  Tobacco dependence  Unstable (10 minute discussion)  She is not interested in stopping smoking at this time  Discussed risks of smoking including heart disease, stroke, cancer and up to death  Reviewed treatment options: medications, cessation on own  Patient prefers to hold off on stopping at this time  Smoking Cessation Resources:  Call the [facility] Quitline at 3-626-32W-QUIT (0-589.399.3052) Monday through Friday from 9: 00 a.m. to 9:00 p.m. or find your [facility] quitline by calling 4-440-QUIT-NOW ([phone]). [phone] - (hearing-impaired)    Stay Busy  Keep your mouth busy. Chew a stick of gum instead of picking up a cigarette. Keep hard candy with you. Drink more water. Do something else. When a craving hits, stop what you're doing immediately and switch to doing something different. Simply changing your routine might help you shake off a craving. Go for a walk or jog. Or go up and down the stairs a few times. Physical activity, even in short bursts, can help boost your energy and beat a craving. Take slow, deep breaths. Breathe through your craving. Inhale through your nose and exhale through your mouth. Repeat this 10 times or until you're feeling more relaxed. 2. NSVT (nonsustained ventricular tachycardia)  Stable  Managed by cardiology  No new episodes since 2021  No intervention from [facility] 8/2021    3. Need for vaccination  Given today  - Zoster, SHINGRIX, (18 yrs +), IM    Time spent:40 minutes, >50% of which was spent face to face with patient discussing HPI/plan/reviewing records and coordinating care    Return in about 6 months (around 8/28/2023), or if symptoms worsen or fail to improve.

## 2023-04-07 NOTE — PATIENT INSTRUCTIONS
Stations recommended include COVID booster, shingles, and pneumonia 20  Mammogram 12 2022   Check your CT chest in February 2023. Call to schedule your repeat colonoscopy see if your son can take you. Don't  forget about your gallstones  Continue increasing your protein and calories as you can.   I recommend you following up with your PCP every 6 months or less
Detail Level: Detailed
Hide Additional Notes?: No

## 2023-06-12 ENCOUNTER — TELEPHONE (OUTPATIENT)
Dept: PULMONOLOGY | Age: 63
End: 2023-06-12

## 2023-07-27 RX ORDER — MELATONIN
Qty: 90 TABLET | Refills: 1 | Status: SHIPPED | OUTPATIENT
Start: 2023-07-27

## 2023-07-27 NOTE — TELEPHONE ENCOUNTER
Medication:   Requested Prescriptions     Pending Prescriptions Disp Refills    vitamin D3 (CHOLECALCIFEROL) 25 MCG (1000 UT) TABS tablet [Pharmacy Med Name: VITAMIN D3 25 MCG TABLET] 90 tablet 1     Sig: TAKE ONE TABLET BY MOUTH DAILY        Last Filled:  01/30/2023 #90 1rf    Patient Phone Number: [phone] (home)     Last appt: 1/30/2023   Next appt: Visit date not found    Last OARRS: No flowsheet data found.     Wrong Office

## 2023-08-11 ENCOUNTER — TELEPHONE (OUTPATIENT)
Dept: FAMILY MEDICINE CLINIC | Age: 63
End: 2023-08-11

## 2023-08-11 ENCOUNTER — APPOINTMENT (OUTPATIENT)
Dept: CT IMAGING | Age: 63
End: 2023-08-11
Payer: MEDICARE

## 2023-08-11 ENCOUNTER — HOSPITAL ENCOUNTER (EMERGENCY)
Age: 63
Discharge: HOME | End: 2023-08-11
Payer: MEDICARE

## 2023-08-11 ENCOUNTER — NURSE TRIAGE (OUTPATIENT)
Dept: OTHER | Facility: CLINIC | Age: 63
End: 2023-08-11

## 2023-08-11 VITALS
DIASTOLIC BLOOD PRESSURE: 80 MMHG | HEART RATE: 74 BPM | BODY MASS INDEX: 18.1 KG/M2 | RESPIRATION RATE: 14 BRPM | TEMPERATURE: 97.9 F | SYSTOLIC BLOOD PRESSURE: 122 MMHG | HEIGHT: 64 IN | WEIGHT: 106 LBS | OXYGEN SATURATION: 98 %

## 2023-08-11 DIAGNOSIS — S76.912A MUSCLE STRAIN OF LEFT THIGH, INITIAL ENCOUNTER: ICD-10-CM

## 2023-08-11 DIAGNOSIS — M79.605 LEFT LEG PAIN: Primary | ICD-10-CM

## 2023-08-11 DIAGNOSIS — R91.1 INCIDENTAL PULMONARY NODULE: ICD-10-CM

## 2023-08-11 DIAGNOSIS — R10.32 ABDOMINAL PAIN, LEFT LOWER QUADRANT: ICD-10-CM

## 2023-08-11 LAB
ANION GAP SERPL CALCULATED.3IONS-SCNC: 9 MMOL/L (ref 3–16)
BACTERIA URNS QL MICRO: NORMAL /HPF
BASOPHILS # BLD: 0.1 K/UL (ref 0–0.2)
BASOPHILS NFR BLD: 0.9 %
BILIRUB UR QL STRIP.AUTO: NEGATIVE
BUN SERPL-MCNC: 6 MG/DL (ref 7–20)
CALCIUM SERPL-MCNC: 9 MG/DL (ref 8.3–10.6)
CHLORIDE SERPL-SCNC: 104 MMOL/L (ref 99–110)
CLARITY UR: CLEAR
CO2 SERPL-SCNC: 26 MMOL/L (ref 21–32)
COLOR UR: YELLOW
CREAT SERPL-MCNC: 0.6 MG/DL (ref 0.6–1.2)
DEPRECATED RDW RBC AUTO: 13.4 % (ref 12.4–15.4)
EOSINOPHIL # BLD: 0 K/UL (ref 0–0.6)
EOSINOPHIL NFR BLD: 0.7 %
EPI CELLS #/AREA URNS AUTO: 0 /HPF (ref 0–5)
GFR SERPLBLD CREATININE-BSD FMLA CKD-EPI: >60 ML/MIN/{1.73_M2}
GLUCOSE SERPL-MCNC: 85 MG/DL (ref 70–99)
GLUCOSE UR STRIP.AUTO-MCNC: NEGATIVE MG/DL
HCT VFR BLD AUTO: 37.7 % (ref 36–48)
HGB BLD-MCNC: 12.6 G/DL (ref 12–16)
HGB UR QL STRIP.AUTO: ABNORMAL
HYALINE CASTS #/AREA URNS AUTO: 0 /LPF (ref 0–8)
KETONES UR STRIP.AUTO-MCNC: NEGATIVE MG/DL
LEUKOCYTE ESTERASE UR QL STRIP.AUTO: NEGATIVE
LYMPHOCYTES # BLD: 1.9 K/UL (ref 1–5.1)
LYMPHOCYTES NFR BLD: 30.2 %
MCH RBC QN AUTO: 30.4 PG (ref 26–34)
MCHC RBC AUTO-ENTMCNC: 33.4 G/DL (ref 31–36)
MCV RBC AUTO: 91.1 FL (ref 80–100)
MONOCYTES # BLD: 0.4 K/UL (ref 0–1.3)
MONOCYTES NFR BLD: 7.1 %
NEUTROPHILS # BLD: 3.9 K/UL (ref 1.7–7.7)
NEUTROPHILS NFR BLD: 61.1 %
NITRITE UR QL STRIP.AUTO: NEGATIVE
PH UR STRIP.AUTO: 6 [PH] (ref 5–8)
PLATELET # BLD AUTO: 304 K/UL (ref 135–450)
PMV BLD AUTO: 7.4 FL (ref 5–10.5)
POTASSIUM SERPL-SCNC: 3.8 MMOL/L (ref 3.5–5.1)
PROT UR STRIP.AUTO-MCNC: NEGATIVE MG/DL
RBC # BLD AUTO: 4.14 M/UL (ref 4–5.2)
RBC CLUMPS #/AREA URNS AUTO: 1 /HPF (ref 0–4)
SODIUM SERPL-SCNC: 139 MMOL/L (ref 136–145)
SP GR UR STRIP.AUTO: <=1.005 (ref 1–1.03)
UA COMPLETE W REFLEX CULTURE PNL UR: ABNORMAL
UA DIPSTICK W REFLEX MICRO PNL UR: YES
URN SPEC COLLECT METH UR: ABNORMAL
UROBILINOGEN UR STRIP-ACNC: 0.2 E.U./DL
WBC # BLD AUTO: 6.3 K/UL (ref 4–11)
WBC #/AREA URNS AUTO: 0 /HPF (ref 0–5)

## 2023-08-11 PROCEDURE — 74177 CT ABD & PELVIS W/CONTRAST: CPT

## 2023-08-11 PROCEDURE — 93971 EXTREMITY STUDY: CPT

## 2023-08-11 PROCEDURE — 99285 EMERGENCY DEPT VISIT HI MDM: CPT

## 2023-08-11 PROCEDURE — 85025 COMPLETE CBC W/AUTO DIFF WBC: CPT

## 2023-08-11 PROCEDURE — 80048 BASIC METABOLIC PNL TOTAL CA: CPT

## 2023-08-11 PROCEDURE — 6360000004 HC RX CONTRAST MEDICATION: Performed by: PHYSICIAN ASSISTANT

## 2023-08-11 PROCEDURE — 81001 URINALYSIS AUTO W/SCOPE: CPT

## 2023-08-11 RX ADMIN — IOPAMIDOL 75 ML: 755 INJECTION, SOLUTION INTRAVENOUS at 15:59

## 2023-08-11 ASSESSMENT — ENCOUNTER SYMPTOMS
DIARRHEA: 0
SHORTNESS OF BREATH: 0
ABDOMINAL PAIN: 1
RHINORRHEA: 0
CONSTIPATION: 0
COUGH: 0
BACK PAIN: 0
VOMITING: 0
SORE THROAT: 0
EYE PAIN: 0
NAUSEA: 0

## 2023-08-11 ASSESSMENT — PAIN - FUNCTIONAL ASSESSMENT: PAIN_FUNCTIONAL_ASSESSMENT: 0-10

## 2023-08-11 ASSESSMENT — PAIN DESCRIPTION - ORIENTATION: ORIENTATION: LEFT

## 2023-08-11 ASSESSMENT — PAIN SCALES - GENERAL: PAINLEVEL_OUTOF10: 4

## 2023-08-11 ASSESSMENT — LIFESTYLE VARIABLES: HOW OFTEN DO YOU HAVE A DRINK CONTAINING ALCOHOL: NEVER

## 2023-08-11 ASSESSMENT — PAIN DESCRIPTION - LOCATION: LOCATION: LEG;KNEE;HIP

## 2023-08-11 NOTE — ED PROVIDER NOTES
[facility]        Pt Name: Ike Morillo  MRN: [id number]  [address] [facility] [place] 1960  Date of evaluation: 8/11/2023  Provider: LUCIAN Cheng  PCP: VKNG Knight - CNP  Note Started: 1:20 PM EDT 8/11/23      DEBBIE. I have evaluated this patient. CHIEF COMPLAINT       Chief Complaint   Patient presents with    Leg Pain     Left leg pain started in calf went up leg. Knee area felt hot. Pain went up to left groin. Started 2 nights ago. HISTORY OF PRESENT ILLNESS: 1 or more Elements     History from : Patient    Limitations to history : None    Ike Morillo is a 58 y.o. female who presents to the emergency room due to left leg pain that radiates to her left thigh started 2 days ago. She also developed left lower quadrant abdominal pain starting today that was sharp and stabbing initially but has since improved. She denies any nausea vomiting fevers or chills. She states that she does have a history of DVT in the past approximate 30 years ago. She is not on any anticoagulation medication at this time. She does not have any calf pain or lower leg swelling. Most of her leg pain is up into the anterior left thigh. She denies any rashes vesicular lesions or other skin abnormalities. She denies any chest pain, shortness of breath or difficulty breathing. Nursing Notes were all reviewed and agreed with or any disagreements were addressed in the HPI. REVIEW OF SYSTEMS :      Review of Systems   Constitutional:  Negative for chills, diaphoresis and fever. HENT:  Negative for congestion, rhinorrhea and sore throat. Eyes:  Negative for pain and visual disturbance. Respiratory:  Negative for cough and shortness of breath. Cardiovascular:  Negative for chest pain and leg swelling. Gastrointestinal:  Positive for abdominal pain. Negative for constipation, diarrhea, nausea and vomiting.    Genitourinary:  Negative for

## 2023-08-11 NOTE — ED NOTES
Pt back from vascular at this time, pt going to the bathroom. Then will obtain set of vitals.       Isai Little RN  08/11/23 [id number]

## 2023-08-11 NOTE — TELEPHONE ENCOUNTER
[facility] transfer to Nurse triage    Patient is calling with complaint of Pain in stomach and groin pain that started  in her calf and is now in her abdomen     This has been going on  3days     Patient was referred to Emergency per previous encounter and possible blood clot per patient       Has patient tried any over the counter medications?  no

## 2023-08-11 NOTE — ED NOTES
Reviewed discharge instructions, home medications, and follow up care with patient's parent at bedside who verbalized understanding.        Oziel Hammer RN  08/11/23 2689

## 2023-08-11 NOTE — TELEPHONE ENCOUNTER
Location of patient: [place]    Subjective: Caller states \"Yesterday she was complaining about a sharp pain in her calf into her thigh. I told her to call her doctor. It then subsided. Today she said there was pain in her groin. She had a blood clot before. She is sedentary because of her COPD. \"     Current Symptoms: left groin pain/hip pain. No trauma. No thigh or calf pain at this time. Onset: 1 day ago; worsening    Associated Symptoms: no swelling or bruising. Pt is ambulatory. Pain Severity: Channing Harvey said it's not that high\" states it's hard to get an answer out of her per daughter but pt did wince in pain. Constant pain. Temperature: denies     What has been tried: nothing. LMP: NA Pregnant: NA    Recommended disposition: Go to ED/UCC Now (Or to Office with PCP Approval)    Care advice provided, patient verbalizes understanding; denies any other questions or concerns; instructed to call back for any new or worsening symptoms. Patient/caller agrees to proceed to nearest Emergency Department    Attention Provider: Thank you for allowing me to participate in the care of your patient. The patient was connected to triage in response to symptoms provided. Please do not respond through this encounter as the response is not directed to a shared pool.       Reason for Disposition   History of prior 'blood clot' in leg or lungs (i.e., deep vein thrombosis, pulmonary embolism)    Protocols used: Leg Pain-ADULT-[place]

## 2023-08-11 NOTE — DISCHARGE INSTRUCTIONS
Follow up with your primary care provider in one week for a recheck    Take Tylenol or Ibuprofen for pain as needed. Return to the ED if you have any worsening symptoms. There was an incidental lung nodule in the left lower lobe today. I was about 4mm in size see below for follow up but it is recommended by radiologist today to have a CT scan again in about 6-12 months to reassess this. Lung Nodules    Your x-ray or CT scan shows a nodule (\"spot\") on your lung. This will require follow-up for further evaluation. Please call your Primary Care Physician (PCP) if you have already established one and you confirmed your PCP during your ER visit today. If you do not have a PCP, please call the [address] [place] [address] scheduling number to schedule your Emergency Department follow up visit. Please mention that you are scheduling an \"ER follow up visit\" for a lung nodule. Learning About Lung Nodules  What is a lung nodule? A lung nodule is a growth in the lung. A single nodule surrounded by lung tissue is called a solitary pulmonary nodule. A lung nodule might not cause any symptoms. Your doctor may have found one or more nodules on your lung when you were having a chest X-ray or CT scan. Or it may have been found during a lung cancer screening. A lung nodule may be caused by an old infection or cancer. It might also be a noncancerous growth. Lung nodules can cause a screening to give an abnormal result. Most nodules do not cause any harm. But without further tests, your doctor can't tell whether an abnormal finding is cancer, a harmless nodule, or something else. What can you expect when you have a lung nodule? Your doctor will look at several risk factors to see how likely it is that the nodule is cancer. He or she will look at: Whether you smoke or have ever smoked. Your age and your family's medical history. Whether you have ever had lung cancer. The size and shape of the nodule.   Whether the nodule has changed in size. Your doctor may look at past chest X-rays or CT scans, if available, and compare them. Or you may have a series of CT scans to see if the nodule grows over time. What happens next depends on the risk of the nodule being cancer. If you have no risk factors and the nodule is small, your doctor may advise doing nothing. If the risk is small, your doctor may schedule follow-up appointments and tests. You may have more CT scans later to see if the nodule is growing. If the nodule hasn't changed in 3 to 6 months, you may have CT scans every year. If it hasn't changed in 2 years, you may not need any more tests. If there's a higher risk of cancer, your doctor may:  Do a PET scan, which may help tell if the nodule is cancerous or not. Take a sample of tissue from the nodule for testing. This is called a biopsy. Remove the nodule with surgery. Follow-up care is a key part of your treatment and safety. Be sure to make and go to all appointments, and call your doctor if you are having problems. It's also a good idea to know your test results and keep a list of the medicines you take. What is the next step in evaluation of a lung nodule? Below are the recommended guidelines for management of pulmonary nodules, you can discuss these recommendations at your follow-up appointment:     Nodule size less than or equal to ?4 mm  In a low-risk patient, no follow-up needed. In a high-risk patient, follow-up CT at 12 months; if unchanged, no further follow-up. Nodule size equals >4-6 mm  In a low-risk patient, follow-up CT at 12 months; if unchanged, no further follow-up. In a high-risk patient, initial follow-up CT at 6-12 months then at 18-24 months if no change. Nodule size equals >6-8 mm  In a low-risk patient, initial follow-up CT at 6-12 months then at 18-24 months if no change. In a high-risk patient, initial follow-up CT at 3-6 months then at 9-12 months and 24 months if no change.

## 2023-08-14 ENCOUNTER — CARE COORDINATION (OUTPATIENT)
Dept: CARE COORDINATION | Age: 63
End: 2023-08-14

## 2023-08-14 NOTE — CARE COORDINATION
Ambulatory Care Coordination  ED Follow up Call    Reason for ED visit:  left leg pain    Status:     significantly improved - no more pain     Did you call your PCP prior to going to the ED? Yes      Did you receive a discharge instructions from the Emergency Room? Yes  Review of Instructions:     Understands what to report/when to return?:  Yes   Understands discharge instructions?:  Yes   Following discharge instructions?:  Yes   If not why? Are there any new complaints of pain? No  New Pain Meds? No    Constipation prophylaxis needed? N/A    If you have a wound is the dressing clean, dry, and intact? N/A  Understands wound care regimen? N/A    Are there any other complaints/concerns that you wish to tell your provider? No     FU appts/Provider:    Future Appointments   Date Time Provider [address]   8/29/2023  8:00 AM KVNG Wilson CNP   1/4/2024  7:40 AM Whit Peralta [facility]     Patient has an appointment on 8/29 with PCP. ACM encouraged patient to call office and move up appointment. New Medications?:   No      Medication Reconciliation by phone - Yes  Understands Medications? Yes  Taking Medications? Yes  Can you swallow your pills? Yes    Any further needs in the home i.e. Equipment?   Not Applicable    Link to services in community?:  N/A   Which services:

## 2023-08-25 ENCOUNTER — TELEPHONE (OUTPATIENT)
Dept: CASE MANAGEMENT | Age: 63
End: 2023-08-25

## 2023-08-25 NOTE — TELEPHONE ENCOUNTER
Claudia Amaral, APRN-CNP-  Patient recently seen in ER. She had a CT abd/pel on 8/11/23:    Development of a spiculated 4 mm nodule in the left lower lobe anteriorly. Given the background of emphysema, a follow-up chest CT in 6-12 months is  recommended. If you would like to place any orders, I can assist with follow up.     Thank you,  Sarah Mills, RN  Lung Navigator  [place] [address]  [phone]

## 2023-08-28 ENCOUNTER — TELEPHONE (OUTPATIENT)
Dept: CASE MANAGEMENT | Age: 63
End: 2023-08-28

## 2023-08-28 DIAGNOSIS — R91.1 PULMONARY NODULE: Primary | ICD-10-CM

## 2023-08-28 NOTE — TELEPHONE ENCOUNTER
Mar Ramirez, APRN-CNP-  Patient has a long history of smoking and a new spiculated pulmonary nodule. I would recommend a pulmonary referral and  let them manage care for this. I have pended the order for you and will follow the patient to assist with follow up.      Thank you,   Bertha Benites, RN  Lung Navigator  [facility]  [address] [place] [address]  [phone]

## 2023-08-29 ENCOUNTER — OFFICE VISIT (OUTPATIENT)
Dept: FAMILY MEDICINE CLINIC | Age: 63
End: 2023-08-29

## 2023-08-29 ENCOUNTER — TELEPHONE (OUTPATIENT)
Dept: CASE MANAGEMENT | Age: 63
End: 2023-08-29

## 2023-08-29 VITALS
HEIGHT: 64 IN | RESPIRATION RATE: 14 BRPM | SYSTOLIC BLOOD PRESSURE: 130 MMHG | DIASTOLIC BLOOD PRESSURE: 78 MMHG | HEART RATE: 86 BPM | WEIGHT: 110 LBS | OXYGEN SATURATION: 97 % | BODY MASS INDEX: 18.78 KG/M2

## 2023-08-29 DIAGNOSIS — E55.9 VITAMIN D DEFICIENCY: ICD-10-CM

## 2023-08-29 DIAGNOSIS — Z11.4 ENCOUNTER FOR SCREENING FOR HIV: ICD-10-CM

## 2023-08-29 DIAGNOSIS — Z23 NEED FOR VACCINATION: ICD-10-CM

## 2023-08-29 DIAGNOSIS — D64.9 ANEMIA, UNSPECIFIED TYPE: ICD-10-CM

## 2023-08-29 DIAGNOSIS — Z11.59 NEED FOR HEPATITIS C SCREENING TEST: ICD-10-CM

## 2023-08-29 DIAGNOSIS — Z12.11 COLON CANCER SCREENING: ICD-10-CM

## 2023-08-29 DIAGNOSIS — H91.93 DECREASED HEARING OF BOTH EARS: ICD-10-CM

## 2023-08-29 DIAGNOSIS — Z00.00 MEDICARE ANNUAL WELLNESS VISIT, SUBSEQUENT: Primary | ICD-10-CM

## 2023-08-29 DIAGNOSIS — R73.9 ELEVATED BLOOD SUGAR: ICD-10-CM

## 2023-08-29 DIAGNOSIS — E78.2 MIXED HYPERLIPIDEMIA: ICD-10-CM

## 2023-08-29 DIAGNOSIS — Z01.419 ENCOUNTER FOR CERVICAL PAP SMEAR WITH PELVIC EXAM: ICD-10-CM

## 2023-08-29 DIAGNOSIS — Z87.891 PERSONAL HISTORY OF TOBACCO USE, PRESENTING HAZARDS TO HEALTH: ICD-10-CM

## 2023-08-29 DIAGNOSIS — Z23 NEED FOR PROPHYLACTIC VACCINATION AND INOCULATION AGAINST VARICELLA: ICD-10-CM

## 2023-08-29 LAB
25(OH)D3 SERPL-MCNC: 58.6 NG/ML
ALBUMIN SERPL-MCNC: 4.3 G/DL (ref 3.4–5)
ALBUMIN/GLOB SERPL: 1.9 {RATIO} (ref 1.1–2.2)
ALP SERPL-CCNC: 110 U/L (ref 40–129)
ALT SERPL-CCNC: 12 U/L (ref 10–40)
ANION GAP SERPL CALCULATED.3IONS-SCNC: 11 MMOL/L (ref 3–16)
AST SERPL-CCNC: 23 U/L (ref 15–37)
BASOPHILS # BLD: 0.1 K/UL (ref 0–0.2)
BASOPHILS NFR BLD: 0.9 %
BILIRUB SERPL-MCNC: 0.4 MG/DL (ref 0–1)
BUN SERPL-MCNC: 7 MG/DL (ref 7–20)
CALCIUM SERPL-MCNC: 9.2 MG/DL (ref 8.3–10.6)
CHLORIDE SERPL-SCNC: 106 MMOL/L (ref 99–110)
CHOLEST SERPL-MCNC: 145 MG/DL (ref 0–199)
CO2 SERPL-SCNC: 25 MMOL/L (ref 21–32)
CREAT SERPL-MCNC: 0.7 MG/DL (ref 0.6–1.2)
DEPRECATED RDW RBC AUTO: 13.7 % (ref 12.4–15.4)
EOSINOPHIL # BLD: 0.1 K/UL (ref 0–0.6)
EOSINOPHIL NFR BLD: 1.3 %
GFR SERPLBLD CREATININE-BSD FMLA CKD-EPI: >60 ML/MIN/{1.73_M2}
GLUCOSE P FAST SERPL-MCNC: 81 MG/DL (ref 70–99)
HCT VFR BLD AUTO: 36.9 % (ref 36–48)
HCV AB SERPL QL IA: NORMAL
HDLC SERPL-MCNC: 53 MG/DL (ref 40–60)
HGB BLD-MCNC: 12.8 G/DL (ref 12–16)
LDL CHOLESTEROL CALCULATED: 76 MG/DL
LYMPHOCYTES # BLD: 1.4 K/UL (ref 1–5.1)
LYMPHOCYTES NFR BLD: 23.5 %
MCH RBC QN AUTO: 31.9 PG (ref 26–34)
MCHC RBC AUTO-ENTMCNC: 34.7 G/DL (ref 31–36)
MCV RBC AUTO: 91.8 FL (ref 80–100)
MONOCYTES # BLD: 0.5 K/UL (ref 0–1.3)
MONOCYTES NFR BLD: 9.3 %
NEUTROPHILS # BLD: 3.8 K/UL (ref 1.7–7.7)
NEUTROPHILS NFR BLD: 65 %
PLATELET # BLD AUTO: 270 K/UL (ref 135–450)
PMV BLD AUTO: 8.4 FL (ref 5–10.5)
POTASSIUM SERPL-SCNC: 4.1 MMOL/L (ref 3.5–5.1)
PROT SERPL-MCNC: 6.6 G/DL (ref 6.4–8.2)
RBC # BLD AUTO: 4.02 M/UL (ref 4–5.2)
SODIUM SERPL-SCNC: 142 MMOL/L (ref 136–145)
TRIGL SERPL-MCNC: 78 MG/DL (ref 0–150)
TSH SERPL DL<=0.005 MIU/L-ACNC: 0.91 UIU/ML (ref 0.27–4.2)
VLDLC SERPL CALC-MCNC: 16 MG/DL
WBC # BLD AUTO: 5.9 K/UL (ref 4–11)

## 2023-08-29 ASSESSMENT — PATIENT HEALTH QUESTIONNAIRE - PHQ9
1. LITTLE INTEREST OR PLEASURE IN DOING THINGS: 0
8. MOVING OR SPEAKING SO SLOWLY THAT OTHER PEOPLE COULD HAVE NOTICED. OR THE OPPOSITE, BEING SO FIGETY OR RESTLESS THAT YOU HAVE BEEN MOVING AROUND A LOT MORE THAN USUAL: 0
9. THOUGHTS THAT YOU WOULD BE BETTER OFF DEAD, OR OF HURTING YOURSELF: 0
SUM OF ALL RESPONSES TO PHQ9 QUESTIONS 1 & 2: 0
10. IF YOU CHECKED OFF ANY PROBLEMS, HOW DIFFICULT HAVE THESE PROBLEMS MADE IT FOR YOU TO DO YOUR WORK, TAKE CARE OF THINGS AT HOME, OR GET ALONG WITH OTHER PEOPLE: 0
SUM OF ALL RESPONSES TO PHQ QUESTIONS 1-9: 0
7. TROUBLE CONCENTRATING ON THINGS, SUCH AS READING THE NEWSPAPER OR WATCHING TELEVISION: 0
5. POOR APPETITE OR OVEREATING: 0
3. TROUBLE FALLING OR STAYING ASLEEP: 0
SUM OF ALL RESPONSES TO PHQ QUESTIONS 1-9: 0
2. FEELING DOWN, DEPRESSED OR HOPELESS: 0
6. FEELING BAD ABOUT YOURSELF - OR THAT YOU ARE A FAILURE OR HAVE LET YOURSELF OR YOUR FAMILY DOWN: 0
4. FEELING TIRED OR HAVING LITTLE ENERGY: 0

## 2023-08-29 ASSESSMENT — LIFESTYLE VARIABLES
HOW OFTEN DO YOU HAVE A DRINK CONTAINING ALCOHOL: NEVER
HOW MANY STANDARD DRINKS CONTAINING ALCOHOL DO YOU HAVE ON A TYPICAL DAY: PATIENT DOES NOT DRINK

## 2023-08-29 NOTE — TELEPHONE ENCOUNTER
Dr Eguene Nelson-  Patient had a CT abd/pel 8/11/23 in the ER:    IMPRESSION:  No acute abnormality identified to explain left lower quadrant abdominal pain. Development of a spiculated 4 mm nodule in the left lower lobe anteriorly. Given the background of emphysema, a follow-up chest CT in 6-12 months is  recommended. She  has a follow up appointment with you on 1/4/24. If you have any orders, I can assist with follow up.     Thank you,   Ananda Peterson, RN  Lung Navigator  [facility]  [address] [facility] [facility] [address]  [phone]

## 2023-08-29 NOTE — PATIENT INSTRUCTIONS
Call to schedule your annual PAP smear  [facility]ne[facility] - Lauren Barton MD, [address], [address] [facility], [address] [facility]  Phone: [phone]    Call to schedule colonoscopy  [facility], [address], [place]  [address] [employer],[place] [address], [address]  Phone: [phone]    Call to schedule your hearing evaluation  [facility] - Myesha Johnson, AuD. [address], [address]  [place], [address] [facility]  [phone]     Hearing Loss: Care Instructions  Overview     Hearing loss is a sudden or slow decrease in how well you hear. It can range from slight to profound. Permanent hearing loss can occur with aging. It also can happen when you are exposed long-term to loud noise. Examples include listening to loud music, riding motorcycles, or being around other loud machines. Hearing loss can affect your work and home life. It can make you feel lonely or depressed. You may feel that you have lost your independence. But hearing aids and other devices can help you hear better and feel connected to others. Follow-up care is a key part of your treatment and safety. Be sure to make and go to all appointments, and call your doctor if you are having problems. It's also a good idea to know your test results and keep a list of the medicines you take. How can you care for yourself at home? Avoid loud noises whenever possible. This helps keep your hearing from getting worse. Always wear hearing protection around loud noises. Wear a hearing aid as directed. A professional can help you pick a hearing aid that will work best for you. You can also get hearing aids over the counter for mild to moderate hearing loss. Have hearing tests as your doctor suggests. They can show whether your hearing has changed. Your hearing aid may need to be adjusted. Use other devices as needed.  These may include:  Telephone amplifiers and hearing aids that

## 2023-08-30 ENCOUNTER — PROCEDURE VISIT (OUTPATIENT)
Dept: AUDIOLOGY | Age: 63
End: 2023-08-30
Payer: MEDICARE

## 2023-08-30 DIAGNOSIS — H93.13 TINNITUS OF BOTH EARS: ICD-10-CM

## 2023-08-30 DIAGNOSIS — H90.3 SENSORINEURAL HEARING LOSS, BILATERAL: Primary | ICD-10-CM

## 2023-08-30 LAB
EST. AVERAGE GLUCOSE BLD GHB EST-MCNC: 114 MG/DL
HBA1C MFR BLD: 5.6 %

## 2023-08-30 PROCEDURE — 92567 TYMPANOMETRY: CPT

## 2023-08-30 PROCEDURE — 92557 COMPREHENSIVE HEARING TEST: CPT

## 2023-08-30 NOTE — PROGRESS NOTES
Janey Chavarria   1960, 58 y.o. female   [id number]       Referring Provider: KVNG Bowles CNP   Referral Type: In an order in [address] [place] [address]    Reason for Visit: Evaluation of suspected change in hearing, tinnitus, or balance. ADULT AUDIOLOGIC EVALUATION      Janey Chavarria is a 58 y.o. female seen today, 8/30/2023 , for an initial audiologic evaluation. AUDIOLOGIC AND OTHER PERTINENT MEDICAL HISTORY:      Janey Chavarria reports decreased hearing for the last couple of years. She notes her daughter has also expressed concerns. Her last test was 5-6 years ago. She endorses bilateral intermittent tinnitus and no other otologic complaints. She denied otalgia, aural fullness, otorrhea, dizziness, history of noise exposure, history of head trauma, history of ear surgery, and family history of hearing loss    Date: 8/30/2023     IMPRESSIONS:      Today's results revealed symmetric sensorineural hearing loss . Excellent speech understanding when in quiet. Tympanometry indicates normal middle ear function. Discussed test results and implications with patient. Discussed scheduling a HAE. Discussed use of tinnitus management strategies. Hearing aids recommended at this time. Follow medical recommendations of KVNG Bowles CNP . ASSESSMENT AND FINDINGS:     Otoscopy unremarkable. RIGHT EAR:  Hearing Sensitivity: Mild to moderately severe sensorineural hearing loss   Speech Recognition Threshold: 40 dB HL  Word Recognition: Excellent 100%, based on NU-6 25-word list at 80 dBHL using recorded speech stimuli. Tympanometry: Normal peak pressure and compliance, Type A tympanogram, consistent with normal middle ear function. LEFT EAR:  Hearing Sensitivity: Mild to moderately severe SNHL  Speech Recognition Threshold: 40 dB HL  Word Recognition: Excellent 92%, based on NU-6 25-word list at 80 dBHL using recorded speech stimuli.     Tympanometry: Normal peak pressure and compliance, Type A tympanogram,

## 2023-08-30 NOTE — PATIENT INSTRUCTIONS
alert you to the doorbell, a ringing telephone, or a baby monitor. Television closed-captioning. This shows the words at the bottom of the screen. Most new TVs can do this. TTY (text telephone). This lets you type messages back and forth on the telephone instead of talking or listening. These devices are also called TDD. When messages are typed on the keyboard, they are sent over the phone line to a receiving TTY. The message is shown on a monitor. Use pagers, fax machines, text, and email if it is hard for you to communicate by telephone. Try to learn a listening technique called speech-reading. It is not lip-reading. You pay attention to people's gestures, expressions, posture, and tone of voice. These clues can help you understand what a person is saying. Face the person you are talking to, and have him or her face you. Make sure the lighting is good. You need to see the other person's face clearly. Think about counseling if you need help to adjust to your hearing loss. When should you call for help? Watch closely for changes in your health, and be sure to contact your doctor if:    You think your hearing is getting worse. You have new symptoms, such as dizziness or nausea. Tinnitus: Overview and Management Strategies          Many people have some ringing sounds in their ears once in a while. You may hear a roar, a hiss, a tinkle, or a buzz. The sound usually lasts only a few minutes. If it goes on all the time, you may have tinnitus. Tinnitus is usually caused by long-term exposure to loud noise. This damages the nerves in the inner ear. It can occur with all types of hearing loss. It may be a symptom of almost any ear problem. Tinnitus may be caused by a buildup of earwax. Or, it may be caused by ear infections or certain medicines (especially antibiotics or large amounts of aspirin).  You can also hear noises in your ears because of an injury to the ears, drinking too much alcohol or

## 2023-08-31 LAB
HIV 1+2 AB+HIV1 P24 AG SERPL QL IA: NORMAL
HIV 2 AB SERPL QL IA: NORMAL
HIV1 AB SERPL QL IA: NORMAL
HIV1 P24 AG SERPL QL IA: NORMAL

## 2023-09-01 ENCOUNTER — CARE COORDINATION (OUTPATIENT)
Dept: CARE COORDINATION | Age: 63
End: 2023-09-01

## 2023-09-01 NOTE — TELEPHONE ENCOUNTER
She will be due in Feb and since I am seeing her in January I will order the follow up CT at the next visit.   Thanks

## 2023-09-07 ENCOUNTER — CARE COORDINATION (OUTPATIENT)
Dept: CARE COORDINATION | Age: 63
End: 2023-09-07

## 2023-09-12 ENCOUNTER — CARE COORDINATION (OUTPATIENT)
Dept: CARE COORDINATION | Age: 63
End: 2023-09-12

## 2023-09-28 ENCOUNTER — OFFICE VISIT (OUTPATIENT)
Dept: FAMILY MEDICINE CLINIC | Age: 63
End: 2023-09-28

## 2023-09-28 VITALS
HEIGHT: 64 IN | WEIGHT: 108.4 LBS | BODY MASS INDEX: 18.51 KG/M2 | SYSTOLIC BLOOD PRESSURE: 112 MMHG | HEART RATE: 92 BPM | RESPIRATION RATE: 14 BRPM | DIASTOLIC BLOOD PRESSURE: 68 MMHG | OXYGEN SATURATION: 98 %

## 2023-09-28 DIAGNOSIS — R59.9 SWOLLEN LYMPH NODES: Primary | ICD-10-CM

## 2023-09-28 ASSESSMENT — PATIENT HEALTH QUESTIONNAIRE - PHQ9
3. TROUBLE FALLING OR STAYING ASLEEP: 0
2. FEELING DOWN, DEPRESSED OR HOPELESS: 0
1. LITTLE INTEREST OR PLEASURE IN DOING THINGS: 0
8. MOVING OR SPEAKING SO SLOWLY THAT OTHER PEOPLE COULD HAVE NOTICED. OR THE OPPOSITE, BEING SO FIGETY OR RESTLESS THAT YOU HAVE BEEN MOVING AROUND A LOT MORE THAN USUAL: 0
5. POOR APPETITE OR OVEREATING: 0
SUM OF ALL RESPONSES TO PHQ QUESTIONS 1-9: 0
6. FEELING BAD ABOUT YOURSELF - OR THAT YOU ARE A FAILURE OR HAVE LET YOURSELF OR YOUR FAMILY DOWN: 0
SUM OF ALL RESPONSES TO PHQ9 QUESTIONS 1 & 2: 0
SUM OF ALL RESPONSES TO PHQ QUESTIONS 1-9: 0
9. THOUGHTS THAT YOU WOULD BE BETTER OFF DEAD, OR OF HURTING YOURSELF: 0
SUM OF ALL RESPONSES TO PHQ QUESTIONS 1-9: 0
SUM OF ALL RESPONSES TO PHQ QUESTIONS 1-9: 0
10. IF YOU CHECKED OFF ANY PROBLEMS, HOW DIFFICULT HAVE THESE PROBLEMS MADE IT FOR YOU TO DO YOUR WORK, TAKE CARE OF THINGS AT HOME, OR GET ALONG WITH OTHER PEOPLE: 0
7. TROUBLE CONCENTRATING ON THINGS, SUCH AS READING THE NEWSPAPER OR WATCHING TELEVISION: 0
4. FEELING TIRED OR HAVING LITTLE ENERGY: 0

## 2023-09-28 ASSESSMENT — ENCOUNTER SYMPTOMS
COUGH: 0
GASTROINTESTINAL NEGATIVE: 1
WHEEZING: 0
SHORTNESS OF BREATH: 0
CHEST TIGHTNESS: 0

## 2023-09-28 NOTE — PROGRESS NOTES
2023     Marlo Primrose (:  1960) is a 58 y.o. female, here for evaluation of the following medical concerns:    Chief Complaint   Patient presents with    Neck Swelling     Left lymph node swelling, x2 weeks     Has had swelling of a left neck lymph node. Will come and go, will go up into her left cheek and jaw. Has some throbbing when it is swollen, today she denies swelling. She is taking advil when it happens and this seems to help. Has also had a cyst removed from the area a few years ago. She denies recent illness. Review of Systems   Constitutional:  Negative for chills, diaphoresis, fatigue and fever. Respiratory:  Negative for cough, chest tightness, shortness of breath and wheezing. Cardiovascular:  Negative for chest pain and palpitations. Gastrointestinal: Negative. Genitourinary: Negative. Neurological:  Negative for dizziness, weakness and headaches. Hematological:  Positive for adenopathy. Prior to Visit Medications    Medication Sig Taking? Authorizing Provider   vitamin D3 (CHOLECALCIFEROL) 25 MCG (1000 UT) TABS tablet TAKE ONE TABLET BY MOUTH DAILY Yes KVNG Loja - ANA   fluticasone-umeclidin-vilant (TRELEGY ELLIPTA) 200-62.5-25 MCG/ACT AEPB inhaler Inhale 1 puff into the lungs daily Yes Sherrie Burkitt, DO   atorvastatin (LIPITOR) 80 MG tablet TAKE ONE TABLET BY MOUTH DAILY Yes Jose Eduardo Ordonez MD   albuterol sulfate HFA (PROAIR HFA) 108 (90 Base) MCG/ACT inhaler Inhale 2 puffs into the lungs every 4 hours as needed for Wheezing or Shortness of Breath Yes Sherrie Burkitt, DO   tiotropium (SPIRIVA RESPIMAT) 2.5 MCG/ACT AERS inhaler Inhale 2 puffs into the lungs daily 2 puffs once daily Yes Sherrie Burkitt, DO   magnesium oxide (MAG-OX) 400 (240 Mg) MG tablet Take 1 tablet by mouth in the morning.  Yes Segun Blair MD   vitamin D (ERGOCALCIFEROL) 1.25 MG (30506 UT) CAPS capsule TAKE ONE CAPSULE BY MOUTH EVERY 2

## 2023-09-29 ENCOUNTER — HOSPITAL ENCOUNTER (OUTPATIENT)
Dept: CT IMAGING | Age: 63
Discharge: HOME | End: 2023-09-29
Payer: MEDICARE

## 2023-09-29 ENCOUNTER — TELEPHONE (OUTPATIENT)
Dept: FAMILY MEDICINE CLINIC | Age: 63
End: 2023-09-29

## 2023-09-29 DIAGNOSIS — R59.9 SWOLLEN LYMPH NODES: ICD-10-CM

## 2023-09-29 DIAGNOSIS — R59.9 SWOLLEN LYMPH NODES: Primary | ICD-10-CM

## 2023-09-29 PROCEDURE — 6360000004 HC RX CONTRAST MEDICATION: Performed by: NURSE PRACTITIONER

## 2023-09-29 PROCEDURE — 70491 CT SOFT TISSUE NECK W/DYE: CPT

## 2023-09-29 RX ADMIN — IOPAMIDOL 75 ML: 755 INJECTION, SOLUTION INTRAVENOUS at 11:08

## 2023-09-29 NOTE — TELEPHONE ENCOUNTER
Consuelo SIFUENTES called and needs pt ct orders changed to     Ct of the soft tissue of the neck    Pt is at the there now waiting for this to be changed     Phone number if any questions [phone]

## 2023-10-09 DIAGNOSIS — E04.1 THYROID NODULE: Primary | ICD-10-CM

## 2023-10-12 ENCOUNTER — HOSPITAL ENCOUNTER (OUTPATIENT)
Dept: ULTRASOUND IMAGING | Age: 63
Discharge: HOME | End: 2023-10-12
Payer: MEDICARE

## 2023-10-12 DIAGNOSIS — E04.1 THYROID NODULE: ICD-10-CM

## 2023-10-12 PROCEDURE — 76536 US EXAM OF HEAD AND NECK: CPT

## 2023-10-17 DIAGNOSIS — E04.1 THYROID NODULE: Primary | ICD-10-CM

## 2023-10-23 ENCOUNTER — OFFICE VISIT (OUTPATIENT)
Dept: ENT CLINIC | Age: 63
End: 2023-10-23
Payer: MEDICARE

## 2023-10-23 VITALS
OXYGEN SATURATION: 97 % | HEIGHT: 64 IN | HEART RATE: 81 BPM | DIASTOLIC BLOOD PRESSURE: 81 MMHG | SYSTOLIC BLOOD PRESSURE: 137 MMHG | BODY MASS INDEX: 18.61 KG/M2 | WEIGHT: 109 LBS | RESPIRATION RATE: 16 BRPM

## 2023-10-23 DIAGNOSIS — R59.1 HEAD AND NECK LYMPHADENOPATHY: ICD-10-CM

## 2023-10-23 DIAGNOSIS — R22.1 NECK MASS: ICD-10-CM

## 2023-10-23 DIAGNOSIS — E04.1 THYROID NODULE: Primary | ICD-10-CM

## 2023-10-23 PROCEDURE — G8420 CALC BMI NORM PARAMETERS: HCPCS | Performed by: STUDENT IN AN ORGANIZED HEALTH CARE EDUCATION/TRAINING PROGRAM

## 2023-10-23 PROCEDURE — 3017F COLORECTAL CA SCREEN DOC REV: CPT | Performed by: STUDENT IN AN ORGANIZED HEALTH CARE EDUCATION/TRAINING PROGRAM

## 2023-10-23 PROCEDURE — G8427 DOCREV CUR MEDS BY ELIG CLIN: HCPCS | Performed by: STUDENT IN AN ORGANIZED HEALTH CARE EDUCATION/TRAINING PROGRAM

## 2023-10-23 PROCEDURE — G8484 FLU IMMUNIZE NO ADMIN: HCPCS | Performed by: STUDENT IN AN ORGANIZED HEALTH CARE EDUCATION/TRAINING PROGRAM

## 2023-10-23 PROCEDURE — 99204 OFFICE O/P NEW MOD 45 MIN: CPT | Performed by: STUDENT IN AN ORGANIZED HEALTH CARE EDUCATION/TRAINING PROGRAM

## 2023-10-23 PROCEDURE — 4004F PT TOBACCO SCREEN RCVD TLK: CPT | Performed by: STUDENT IN AN ORGANIZED HEALTH CARE EDUCATION/TRAINING PROGRAM

## 2023-10-23 NOTE — PROGRESS NOTES
as a nodule which is adjacent to the inferior aspect of the left lobe of the thyroid. An ultrasound of her thyroid was obtained which showed small subcentimeter thyroid nodules as well as a 1.6 x 1.6 cm TI-RADS 2 nodule of the left lower thyroid. There was some concern in the ultrasound report that the inferior lesion could represent a parathyroid adenoma. The patient denies signs or symptoms of hypocalcemia. Her most recent CMP which was reviewed by myself showed a normal calcium level. No history of idiopathic fractures. REVIEW OF SYSTEMS  The following systems were reviewed and revealed the following in addition to any already discussed in the HPI:    PHYSICAL EXAM    GENERAL: No acute distress, alert and oriented, no hoarseness, strong voice  EYES: EOMI, Anti-icteric  HENT:   Head: Normocephalic and atraumatic. Face:  Symmetric, facial nerve intact  Right Ear: Normal external ear, normal external auditory canal, intact tympanic membrane with normal mobility and aerated middle ear  Left Ear: Normal external ear, normal external auditory canal, intact tympanic membrane with normal mobility and aerated middle ear  Mouth/Oral Cavity:  normal lips, Uvula is midline, no mucosal lesions, no trismus, normal dentition, normal salivary quality/flow  Oropharynx/Larynx:  normal oropharynx, 1+ tonsils  Nose:Normal external nasal appearance. Normal mucosa   NECK: Normal range of motion, no thyromegaly, trachea is midline, no lymphadenopathy, no neck masses, no crepitus  CHEST: Normal respiratory effort, no retractions, breathing comfortably  SKIN: No rashes, normal appearing skin, no evidence of skin lesions/tumors  Neuro:  cranial nerve II-XII intact; normal gait  Cardio:  no edema      PROCEDURE      This note was generated completely or in part utilizing Dragon dictation speech recognition software.   Occasionally, words are mistranscribed and despite editing, the text may contain inaccuracies due to no sweating/no chills/no fever/no weight loss

## 2024-01-02 ENCOUNTER — TELEPHONE (OUTPATIENT)
Dept: FAMILY MEDICINE CLINIC | Age: 64
End: 2024-01-02

## 2024-01-02 NOTE — TELEPHONE ENCOUNTER
Pt is asking why not as she has COPD, emphysema, and coughs to much and can't breathe very well. States that she can't do it.

## 2024-01-02 NOTE — TELEPHONE ENCOUNTER
Patient called and needs a letter to excuse her from jury duty on 01/22/2023    Her juror number is [phone]    She said she will  letter in office     Please call and advise

## 2024-01-04 ENCOUNTER — OFFICE VISIT (OUTPATIENT)
Dept: PULMONOLOGY | Age: 64
End: 2024-01-04
Payer: MEDICARE

## 2024-01-04 VITALS
HEIGHT: 64 IN | DIASTOLIC BLOOD PRESSURE: 80 MMHG | HEART RATE: 91 BPM | BODY MASS INDEX: 18.83 KG/M2 | RESPIRATION RATE: 18 BRPM | SYSTOLIC BLOOD PRESSURE: 115 MMHG | WEIGHT: 110.3 LBS | TEMPERATURE: 97.7 F | OXYGEN SATURATION: 98 %

## 2024-01-04 DIAGNOSIS — Z72.0 TOBACCO ABUSE: ICD-10-CM

## 2024-01-04 DIAGNOSIS — Z87.891 PERSONAL HISTORY OF TOBACCO USE: ICD-10-CM

## 2024-01-04 DIAGNOSIS — J44.9 COPD, SEVERE (HCC): Primary | ICD-10-CM

## 2024-01-04 DIAGNOSIS — R91.8 LUNG NODULES: ICD-10-CM

## 2024-01-04 PROCEDURE — 99214 OFFICE O/P EST MOD 30 MIN: CPT | Performed by: INTERNAL MEDICINE

## 2024-01-04 RX ORDER — FLUTICASONE FUROATE, UMECLIDINIUM BROMIDE AND VILANTEROL TRIFENATATE 200; 62.5; 25 UG/1; UG/1; UG/1
1 POWDER RESPIRATORY (INHALATION) DAILY
Qty: 3 EACH | Refills: 3 | Status: SHIPPED | OUTPATIENT
Start: 2024-01-04

## 2024-01-04 NOTE — PATIENT INSTRUCTIONS
care team will explain the results of your scan and answer any questions you may have. If you need any follow-up, he or she will help you understand what to do next.  After a lung cancer screening, you can go back to your usual activities right away.  A lung cancer screening test can't tell if you have lung cancer. If your results are positive, your doctor can't tell whether an abnormal finding is a harmless nodule, cancer, or something else without doing more tests.  What can you do to help prevent lung cancer?  Some lung cancers can't be prevented. But if you smoke, quitting smoking is the best step you can take to prevent lung cancer. If you want to quit, your doctor can recommend medicines or other ways to help.  Follow-up care is a key part of your treatment and safety. Be sure to make and go to all appointments, and call your doctor if you are having problems. It's also a good idea to know your test results and keep a list of the medicines you take.  Where can you learn more?  Go to https://www.[employer].net/patientEd and enter Q940 to learn more about \"Learning About Lung Cancer Screening.\"  Current as of: February 28, 2023               Content Version: 13.9  © 5117-6422 [employer].   Care instructions adapted under license by [employer]. If you have questions about a medical condition or this instruction, always ask your healthcare professional. [employer] disclaims any warranty or liability for your use of this information.

## 2024-01-04 NOTE — PROGRESS NOTES
Chief complaint  This is a 63 y.o. year old female  who comes to see me with a chief complaint of   Chief Complaint   Patient presents with    Follow-up    COPD       HPI  Here with a cc of COPD.      Last time we put her on trelegy.  The change has helped and breathing has improved.  Some more SOB with the colder air.  Some more wheezing.  Down to 1 pack per week.  Has been smoking since age 7      Current Outpatient Medications:     fluticasone-umeclidin-vilant (TRELEGY ELLIPTA) 200-62.5-25 MCG/ACT AEPB inhaler, Inhale 1 puff into the lungs daily, Disp: 3 each, Rfl: 3    vitamin D3 (CHOLECALCIFEROL) 25 MCG (1000 UT) TABS tablet, TAKE ONE TABLET BY MOUTH DAILY, Disp: 90 tablet, Rfl: 1    fluticasone-umeclidin-vilant (TRELEGY ELLIPTA) 200-62.5-25 MCG/ACT AEPB inhaler, Inhale 1 puff into the lungs daily, Disp: 3 each, Rfl: 3    atorvastatin (LIPITOR) 80 MG tablet, TAKE ONE TABLET BY MOUTH DAILY, Disp: 90 tablet, Rfl: 4    albuterol sulfate HFA (PROAIR HFA) 108 (90 Base) MCG/ACT inhaler, Inhale 2 puffs into the lungs every 4 hours as needed for Wheezing or Shortness of Breath, Disp: 3 each, Rfl: 3    aspirin (ASPIRIN CHILDRENS) 81 MG chewable tablet, Take 1 tablet by mouth daily, Disp: 30 tablet, Rfl: 3    albuterol (PROVENTIL) (2.5 MG/3ML) 0.083% nebulizer solution, Take 3 mLs by nebulization every 4 hours as needed for Wheezing, Disp: 120 mL, Rfl: 5    PHYSICAL EXAM:  Vitals:    01/04/24 0822   BP: 115/80   Pulse: 91   Resp: 18   Temp: 97.7 °F (36.5 °C)   SpO2: 98%         GENERAL:  Thin, NAD  HEENT:  No scleral icterus, no conjunctival irritation  NECK:  No thyromegaly, no bruits  LYMPH:  No cervical or supraclavicular adenopathy  HEART:  Regular, rate.   no murmurs  LUNGS: Scattered wheezing   ABDOMEN:  No distention, no organomegaly  EXTREMITIES:  No edema, no digital clubbing  NEURO:  No localizing deficits, CN II-XII intact    Pulmonary Function Testing  3/2018  My interpretation is severe obstruction with

## 2024-01-22 ENCOUNTER — HOSPITAL ENCOUNTER (OUTPATIENT)
Dept: CT IMAGING | Age: 64
Discharge: HOME | End: 2024-01-22
Attending: INTERNAL MEDICINE
Payer: MEDICARE

## 2024-01-22 DIAGNOSIS — Z87.891 PERSONAL HISTORY OF TOBACCO USE: ICD-10-CM

## 2024-01-22 PROCEDURE — 71271 CT THORAX LUNG CANCER SCR C-: CPT

## 2024-01-26 RX ORDER — MELATONIN
1 DAILY
Qty: 90 TABLET | Refills: 1 | Status: SHIPPED | OUTPATIENT
Start: 2024-01-26

## 2024-01-26 NOTE — TELEPHONE ENCOUNTER
Medication:   Requested Prescriptions     Pending Prescriptions Disp Refills    vitamin D3 (CHOLECALCIFEROL) 25 MCG (1000 UT) TABS tablet 90 tablet 1     Sig: Take 1 tablet by mouth daily        Last Filled:  07/27/2023 #90 1rf    Patient Phone Number: [phone] (home)     Last appt: 1/30/2023   Next appt: Visit date not found    Last OARRS:        No data to display                Wrong Office     cranial nerves 2-12 intact

## 2024-01-29 ENCOUNTER — OFFICE VISIT (OUTPATIENT)
Dept: FAMILY MEDICINE CLINIC | Age: 64
End: 2024-01-29
Payer: MEDICARE

## 2024-01-29 VITALS
HEART RATE: 82 BPM | HEIGHT: 64 IN | SYSTOLIC BLOOD PRESSURE: 136 MMHG | WEIGHT: 110 LBS | BODY MASS INDEX: 18.78 KG/M2 | DIASTOLIC BLOOD PRESSURE: 81 MMHG

## 2024-01-29 DIAGNOSIS — M26.621 TMJ TENDERNESS, RIGHT: Primary | ICD-10-CM

## 2024-01-29 DIAGNOSIS — Z23 FLU VACCINE NEED: ICD-10-CM

## 2024-01-29 PROCEDURE — 90674 CCIIV4 VAC NO PRSV 0.5 ML IM: CPT | Performed by: FAMILY MEDICINE

## 2024-01-29 PROCEDURE — G0008 ADMIN INFLUENZA VIRUS VAC: HCPCS | Performed by: FAMILY MEDICINE

## 2024-01-29 PROCEDURE — 99213 OFFICE O/P EST LOW 20 MIN: CPT | Performed by: FAMILY MEDICINE

## 2024-01-29 RX ORDER — PREDNISONE 20 MG/1
20 TABLET ORAL DAILY
Qty: 7 TABLET | Refills: 0 | Status: SHIPPED | OUTPATIENT
Start: 2024-01-29 | End: 2024-02-05

## 2024-01-29 NOTE — PROGRESS NOTES
Natasha Flannery is a 63 y.o. female.    HPI:  Here for right ear check, discomfort  Holding her hand over her ear    No fever, no illness  However her daughter has COVID but is isolating from the rest of the family    Recommend flu vaccine today, patient agreeable    Smoker, down to 5-6 cigs/day   Meds, vitamins and allergies reviewed with pt  Wt Readings from Last 3 Encounters:   01/29/24 49.9 kg (110 lb)   01/04/24 50 kg (110 lb 4.8 oz)   11/13/23 49.9 kg (110 lb)       REVIEW OF SYSTEMS:   CONSTITUTIONAL: See history of present illness,   Weight noted   HEENT: No new vision difficulties or ringing in the ears.   RESPIRATORY: No new SOB, PND, orthopnea or cough.   CARDIOVASCULAR: no CP, palpitations or SOB with exertion  GI: No nausea, vomiting, diarrhea, constipation, abdominal pain or changes in bowel habits.   : No urinary frequency, urgency, incontinence hematuria or dysuria.   SKIN: No cyanosis or skin lesions.   MUSCULOSKELETAL: No new muscle or joint pain.   NEUROLOGICAL: No syncope or TIA-like symptoms.   PSYCHIATRIC: No anxiety, insomnia or depression     Allergies   Allergen Reactions    Pcn [Penicillins]      Rash, swelling       Prior to Visit Medications    Medication Sig Taking? Authorizing Provider   predniSONE (DELTASONE) 20 MG tablet Take 1 tablet by mouth daily for 7 days Yes Bessie Rowell MD   vitamin D3 (CHOLECALCIFEROL) 25 MCG (1000 UT) TABS tablet Take 1 tablet by mouth daily Yes Beatrice Staley, APRN - CNP   fluticasone-umeclidin-vilant (TRELEGY ELLIPTA) 200-62.5-25 MCG/ACT AEPB inhaler Inhale 1 puff into the lungs daily Yes Jabari Trujillo DO   fluticasone-umeclidin-vilant (TRELEGY ELLIPTA) 200-62.5-25 MCG/ACT AEPB inhaler Inhale 1 puff into the lungs daily Yes Jabari Trujillo DO   atorvastatin (LIPITOR) 80 MG tablet TAKE ONE TABLET BY MOUTH DAILY Yes Jacqueline Jordan MD   albuterol sulfate HFA (PROAIR HFA) 108 (90 Base) MCG/ACT inhaler Inhale 2 puffs into the

## 2024-02-01 ENCOUNTER — TELEPHONE (OUTPATIENT)
Dept: FAMILY MEDICINE CLINIC | Age: 64
End: 2024-02-01

## 2024-02-01 DIAGNOSIS — U07.1 COVID: Primary | ICD-10-CM

## 2024-02-01 NOTE — TELEPHONE ENCOUNTER
Patient tested positive for Covid yesterday  Symptoms started 2 days prior to testing  Weak,chills,coughing-dry  No fever  Patient is requesting Yolanda Irvinv- 1-29-24,Dr. Rowell  9-28-23, Beatrice Staley,CNP

## 2024-04-05 ENCOUNTER — OFFICE VISIT (OUTPATIENT)
Dept: FAMILY MEDICINE CLINIC | Age: 64
End: 2024-04-05

## 2024-04-05 VITALS
RESPIRATION RATE: 18 BRPM | HEART RATE: 85 BPM | DIASTOLIC BLOOD PRESSURE: 78 MMHG | WEIGHT: 110 LBS | HEIGHT: 64 IN | OXYGEN SATURATION: 97 % | SYSTOLIC BLOOD PRESSURE: 124 MMHG | BODY MASS INDEX: 18.78 KG/M2

## 2024-04-05 DIAGNOSIS — J40 BRONCHITIS: Primary | ICD-10-CM

## 2024-04-05 DIAGNOSIS — Z12.31 ENCOUNTER FOR SCREENING MAMMOGRAM FOR MALIGNANT NEOPLASM OF BREAST: ICD-10-CM

## 2024-04-05 DIAGNOSIS — I47.29 NSVT (NONSUSTAINED VENTRICULAR TACHYCARDIA) (HCC): ICD-10-CM

## 2024-04-05 SDOH — ECONOMIC STABILITY: FOOD INSECURITY: WITHIN THE PAST 12 MONTHS, YOU WORRIED THAT YOUR FOOD WOULD RUN OUT BEFORE YOU GOT MONEY TO BUY MORE.: NEVER TRUE

## 2024-04-05 SDOH — ECONOMIC STABILITY: FOOD INSECURITY: WITHIN THE PAST 12 MONTHS, THE FOOD YOU BOUGHT JUST DIDN'T LAST AND YOU DIDN'T HAVE MONEY TO GET MORE.: NEVER TRUE

## 2024-04-05 SDOH — ECONOMIC STABILITY: INCOME INSECURITY: HOW HARD IS IT FOR YOU TO PAY FOR THE VERY BASICS LIKE FOOD, HOUSING, MEDICAL CARE, AND HEATING?: NOT HARD AT ALL

## 2024-04-05 ASSESSMENT — PATIENT HEALTH QUESTIONNAIRE - PHQ9
SUM OF ALL RESPONSES TO PHQ QUESTIONS 1-9: 0
2. FEELING DOWN, DEPRESSED OR HOPELESS: NOT AT ALL
SUM OF ALL RESPONSES TO PHQ QUESTIONS 1-9: 0
1. LITTLE INTEREST OR PLEASURE IN DOING THINGS: NOT AT ALL
SUM OF ALL RESPONSES TO PHQ9 QUESTIONS 1 & 2: 0
SUM OF ALL RESPONSES TO PHQ QUESTIONS 1-9: 0
SUM OF ALL RESPONSES TO PHQ QUESTIONS 1-9: 0

## 2024-04-05 ASSESSMENT — ENCOUNTER SYMPTOMS
CHEST TIGHTNESS: 0
GASTROINTESTINAL NEGATIVE: 1
SHORTNESS OF BREATH: 0
WHEEZING: 0
COUGH: 0

## 2024-04-05 NOTE — PROGRESS NOTES
2024     Natasha Flannery (:  1960) is a 64 y.o. female, here for evaluation of the following medical concerns:    Chief Complaint   Patient presents with    Follow-up     Urgent care follow up, chest pains, sore throat, dx with acute bronchitis      Was seen at Urgent Care one week ago, diagnosed with bronchitis, placed on antibiotics, steroids and tessalon pearls.  Was tested for strep and flu, both were negative.  Today is feeling better.  Cough has also improved.  Still smoking, about 1/4 pack per day.  Sees pulmonology in .  Lung screening was ok, 3mm nodule showing no changes from last year.      Review of Systems   Constitutional:  Negative for chills, diaphoresis, fatigue and fever.   Respiratory:  Negative for cough, chest tightness, shortness of breath and wheezing.    Cardiovascular:  Negative for chest pain and palpitations.   Gastrointestinal: Negative.    Genitourinary: Negative.    Neurological:  Negative for dizziness, weakness and headaches.     Prior to Visit Medications    Medication Sig Taking? Authorizing Provider   vitamin D3 (CHOLECALCIFEROL) 25 MCG (1000 UT) TABS tablet Take 1 tablet by mouth daily Yes Beatrice Staley APRN - ANA   fluticasone-umeclidin-vilant (TRELEGY ELLIPTA) 200-62.5-25 MCG/ACT AEPB inhaler Inhale 1 puff into the lungs daily Yes Jabari Trujillo DO   fluticasone-umeclidin-vilant (TRELEGY ELLIPTA) 200-62.5-25 MCG/ACT AEPB inhaler Inhale 1 puff into the lungs daily Yes Jabari Trujillo DO   atorvastatin (LIPITOR) 80 MG tablet TAKE ONE TABLET BY MOUTH DAILY Yes Jacqueline Jordan MD   albuterol sulfate HFA (PROAIR HFA) 108 (90 Base) MCG/ACT inhaler Inhale 2 puffs into the lungs every 4 hours as needed for Wheezing or Shortness of Breath Yes Jabari Trujillo DO   aspirin (ASPIRIN CHILDRENS) 81 MG chewable tablet Take 1 tablet by mouth daily Yes Jose Rafael Jeronimo MD   albuterol (PROVENTIL) (2.5 MG/3ML) 0.083% nebulizer

## 2024-05-08 RX ORDER — ATORVASTATIN CALCIUM 80 MG/1
TABLET, FILM COATED ORAL
Qty: 90 TABLET | Refills: 1 | Status: SHIPPED | OUTPATIENT
Start: 2024-05-08

## 2024-05-08 NOTE — TELEPHONE ENCOUNTER
Medication:   Requested Prescriptions     Pending Prescriptions Disp Refills    atorvastatin (LIPITOR) 80 MG tablet [Pharmacy Med Name: ATORVASTATIN 80 MG TABLET] 90 tablet 4     Sig: TAKE ONE TABLET BY MOUTH DAILY       Last Filled:  01/30/2023 #90 4rf     Patient Phone Number: [phone] (home)     Last appt: 04/05/2024  Next appt: Visit date not found    Last Lipid:   Lab Results   Component Value Date/Time    CHOL 125 08/01/2022 08:30 AM    TRIG 104 08/01/2022 08:30 AM    HDL 53 08/29/2023 09:02 AM             Wrong office

## 2024-05-09 ENCOUNTER — HOSPITAL ENCOUNTER (OUTPATIENT)
Dept: WOMENS IMAGING | Age: 64
Discharge: HOME | End: 2024-05-09
Payer: MEDICARE

## 2024-05-09 VITALS — BODY MASS INDEX: 18.88 KG/M2 | WEIGHT: 110 LBS

## 2024-05-09 DIAGNOSIS — Z12.31 ENCOUNTER FOR SCREENING MAMMOGRAM FOR MALIGNANT NEOPLASM OF BREAST: ICD-10-CM

## 2024-05-09 PROCEDURE — 77063 BREAST TOMOSYNTHESIS BI: CPT

## 2024-07-08 ENCOUNTER — OFFICE VISIT (OUTPATIENT)
Dept: PULMONOLOGY | Age: 64
End: 2024-07-08
Payer: MEDICARE

## 2024-07-08 VITALS
BODY MASS INDEX: 18.27 KG/M2 | TEMPERATURE: 97.3 F | OXYGEN SATURATION: 97 % | HEART RATE: 95 BPM | DIASTOLIC BLOOD PRESSURE: 75 MMHG | HEIGHT: 64 IN | WEIGHT: 107 LBS | SYSTOLIC BLOOD PRESSURE: 123 MMHG | RESPIRATION RATE: 18 BRPM

## 2024-07-08 DIAGNOSIS — Z72.0 TOBACCO ABUSE: ICD-10-CM

## 2024-07-08 DIAGNOSIS — J44.9 COPD, SEVERE (HCC): Primary | ICD-10-CM

## 2024-07-08 DIAGNOSIS — R91.1 LUNG NODULE: ICD-10-CM

## 2024-07-08 PROCEDURE — 1123F ACP DISCUSS/DSCN MKR DOCD: CPT | Performed by: INTERNAL MEDICINE

## 2024-07-08 PROCEDURE — 99213 OFFICE O/P EST LOW 20 MIN: CPT | Performed by: INTERNAL MEDICINE

## 2024-07-08 NOTE — PROGRESS NOTES
Chief complaint  This is a 64 y.o. year old female  who comes to see me with a chief complaint of   Chief Complaint   Patient presents with    Follow-up    COPD       HPI  Here with a cc of COPD.      She is doing well.  On trelegy once a day.  Uses albuterol rarely.  Smoking 2-3 cigs per day.  No changes to health and for the most part ok        Current Outpatient Medications:     atorvastatin (LIPITOR) 80 MG tablet, TAKE ONE TABLET BY MOUTH DAILY, Disp: 90 tablet, Rfl: 1    vitamin D3 (CHOLECALCIFEROL) 25 MCG (1000 UT) TABS tablet, Take 1 tablet by mouth daily, Disp: 90 tablet, Rfl: 1    fluticasone-umeclidin-vilant (TRELEGY ELLIPTA) 200-62.5-25 MCG/ACT AEPB inhaler, Inhale 1 puff into the lungs daily, Disp: 3 each, Rfl: 3    fluticasone-umeclidin-vilant (TRELEGY ELLIPTA) 200-62.5-25 MCG/ACT AEPB inhaler, Inhale 1 puff into the lungs daily, Disp: 3 each, Rfl: 3    albuterol sulfate HFA (PROAIR HFA) 108 (90 Base) MCG/ACT inhaler, Inhale 2 puffs into the lungs every 4 hours as needed for Wheezing or Shortness of Breath, Disp: 3 each, Rfl: 3    aspirin (ASPIRIN CHILDRENS) 81 MG chewable tablet, Take 1 tablet by mouth daily, Disp: 30 tablet, Rfl: 3    albuterol (PROVENTIL) (2.5 MG/3ML) 0.083% nebulizer solution, Take 3 mLs by nebulization every 4 hours as needed for Wheezing, Disp: 120 mL, Rfl: 5    PHYSICAL EXAM:  Vitals:    07/08/24 0759   BP: 123/75   Pulse: 95   Resp: 18   Temp: 97.3 °F (36.3 °C)   SpO2: 97%         GENERAL:  Thin, NAD  HEENT:  No scleral icterus, no conjunctival irritation  NECK:  No thyromegaly, no bruits  LYMPH:  No cervical or supraclavicular adenopathy  HEART:  Regular, rate.   no murmurs  LUNGS: Diminished, expiratory wheezing   ABDOMEN:  No distention, no organomegaly  EXTREMITIES:  No edema, no digital clubbing  NEURO:  No localizing deficits, CN II-XII intact    Pulmonary Function Testing  3/2018  My interpretation is severe obstruction with hyperinflation and reduced diffusing

## 2024-08-07 RX ORDER — CHOLECALCIFEROL (VITAMIN D3) 25 MCG
1 TABLET ORAL DAILY
Qty: 90 TABLET | Refills: 1 | Status: SHIPPED | OUTPATIENT
Start: 2024-08-07

## 2024-08-07 NOTE — TELEPHONE ENCOUNTER
Medication:   Requested Prescriptions     Pending Prescriptions Disp Refills    vitamin D3 (CHOLECALCIFEROL) 25 MCG (1000 UT) TABS tablet [Pharmacy Med Name: VITAMIN D3 25 MCG TABLET] 90 tablet 1     Sig: TAKE 1 TABLET BY MOUTH DAILY       Last Filled:  01/26/2024 #90 1rf     Patient Phone Number: [phone] (home)     Last appt:04/05/2024  Next appt: Visit date not found    Last Labs DM:   Lab Results   Component Value Date/Time    VITD25 58.6 08/29/2023 09:02 AM    VITD25 61.6 08/01/2022 08:30 AM

## 2024-10-08 ENCOUNTER — OFFICE VISIT (OUTPATIENT)
Dept: FAMILY MEDICINE CLINIC | Age: 64
End: 2024-10-08
Payer: MEDICARE

## 2024-10-08 VITALS
OXYGEN SATURATION: 96 % | SYSTOLIC BLOOD PRESSURE: 130 MMHG | HEART RATE: 99 BPM | BODY MASS INDEX: 17.93 KG/M2 | DIASTOLIC BLOOD PRESSURE: 70 MMHG | WEIGHT: 105 LBS | RESPIRATION RATE: 20 BRPM | HEIGHT: 64 IN | TEMPERATURE: 98.5 F

## 2024-10-08 DIAGNOSIS — R09.89 CHEST CONGESTION: ICD-10-CM

## 2024-10-08 DIAGNOSIS — R06.03 RESPIRATORY DISTRESS DETERMINED BY EXAMINATION: Primary | ICD-10-CM

## 2024-10-08 DIAGNOSIS — J43.2 CENTRILOBULAR EMPHYSEMA (HCC): ICD-10-CM

## 2024-10-08 LAB
INFLUENZA A ANTIGEN, POC: NORMAL
INFLUENZA B ANTIGEN, POC: NORMAL
Lab: NORMAL
QC PASS/FAIL: NORMAL
SARS-COV-2 RDRP RESP QL NAA+PROBE: NEGATIVE

## 2024-10-08 PROCEDURE — 87804 INFLUENZA ASSAY W/OPTIC: CPT | Performed by: NURSE PRACTITIONER

## 2024-10-08 PROCEDURE — 99215 OFFICE O/P EST HI 40 MIN: CPT | Performed by: NURSE PRACTITIONER

## 2024-10-08 PROCEDURE — 1123F ACP DISCUSS/DSCN MKR DOCD: CPT | Performed by: NURSE PRACTITIONER

## 2024-10-08 PROCEDURE — 87635 SARS-COV-2 COVID-19 AMP PRB: CPT | Performed by: NURSE PRACTITIONER

## 2024-10-08 RX ORDER — LEVOFLOXACIN 500 MG/1
500 TABLET, FILM COATED ORAL DAILY
Qty: 7 TABLET | Refills: 0 | Status: SHIPPED | OUTPATIENT
Start: 2024-10-08 | End: 2024-10-15

## 2024-10-08 RX ORDER — PREDNISONE 50 MG/1
50 TABLET ORAL DAILY
Qty: 5 TABLET | Refills: 0 | Status: SHIPPED | OUTPATIENT
Start: 2024-10-08 | End: 2024-10-13

## 2024-10-08 ASSESSMENT — ENCOUNTER SYMPTOMS
GASTROINTESTINAL NEGATIVE: 1
SINUS PAIN: 1
NAUSEA: 0
CHEST TIGHTNESS: 1
SORE THROAT: 1
SINUS PRESSURE: 1
DIARRHEA: 0
ABDOMINAL PAIN: 0
COUGH: 1
RHINORRHEA: 1
SHORTNESS OF BREATH: 1
WHEEZING: 0
VOMITING: 0

## 2024-10-08 ASSESSMENT — PATIENT HEALTH QUESTIONNAIRE - PHQ9
SUM OF ALL RESPONSES TO PHQ QUESTIONS 1-9: 0
SUM OF ALL RESPONSES TO PHQ QUESTIONS 1-9: 0
1. LITTLE INTEREST OR PLEASURE IN DOING THINGS: NOT AT ALL
2. FEELING DOWN, DEPRESSED OR HOPELESS: NOT AT ALL
SUM OF ALL RESPONSES TO PHQ QUESTIONS 1-9: 0
SUM OF ALL RESPONSES TO PHQ QUESTIONS 1-9: 0
SUM OF ALL RESPONSES TO PHQ9 QUESTIONS 1 & 2: 0

## 2024-10-08 NOTE — PROGRESS NOTES
10/8/2024     Natasha Flannery (:  1960) is a 64 y.o. female, here for evaluation of the following medical concerns:    Chief Complaint   Patient presents with    Cough     X5 days    Congestion    Fever     Symptoms started about five days ago.  Having fever, congestion, shortness of breath and chest tightness.  Feels her symptoms have worsened.  Daughter and son in law have been sick recently but no diagnosis.  Temp this morning was 99.7.  has had chills and sweating.  Has woken at night diaphoretic.  Has been taking advil occasionally.  She is eating and drinking well.    Review of Systems   Constitutional:  Positive for chills, diaphoresis, fatigue and fever.   HENT:  Positive for congestion, postnasal drip, rhinorrhea, sinus pressure, sinus pain and sore throat. Negative for ear discharge and ear pain.    Respiratory:  Positive for cough, chest tightness and shortness of breath. Negative for wheezing.    Cardiovascular:  Negative for chest pain and palpitations.   Gastrointestinal: Negative.  Negative for abdominal pain, diarrhea, nausea and vomiting.   Genitourinary: Negative.    Neurological:  Positive for headaches. Negative for dizziness and weakness.     Prior to Visit Medications    Medication Sig Taking? Authorizing Provider   levoFLOXacin (LEVAQUIN) 500 MG tablet Take 1 tablet by mouth daily for 7 days Yes Beatrice Staley APRN - CNP   predniSONE (DELTASONE) 50 MG tablet Take 1 tablet by mouth daily for 5 days Yes Beatrice Staley APRN - CNP   vitamin D3 (CHOLECALCIFEROL) 25 MCG (1000 UT) TABS tablet TAKE 1 TABLET BY MOUTH DAILY Yes Beatrice Staley APRN - CNP   atorvastatin (LIPITOR) 80 MG tablet TAKE ONE TABLET BY MOUTH DAILY Yes Beatrice Staley APRN - CNP   fluticasone-umeclidin-vilant (TRELEGY ELLIPTA) 200-62.5-25 MCG/ACT AEPB inhaler Inhale 1 puff into the lungs daily Yes Jabari Trujillo DO   fluticasone-umeclidin-vilant (TRELEGY ELLIPTA) 200-62.5-25 MCG/ACT AEPB inhaler Inhale 1 puff

## 2024-12-28 ENCOUNTER — OFFICE VISIT (OUTPATIENT)
Age: 64
End: 2024-12-28

## 2024-12-28 VITALS
SYSTOLIC BLOOD PRESSURE: 120 MMHG | DIASTOLIC BLOOD PRESSURE: 77 MMHG | HEIGHT: 64 IN | HEART RATE: 103 BPM | BODY MASS INDEX: 17.75 KG/M2 | WEIGHT: 104 LBS | TEMPERATURE: 98 F | OXYGEN SATURATION: 94 %

## 2024-12-28 DIAGNOSIS — J44.0 CHRONIC OBSTRUCTIVE PULMONARY DISEASE WITH ACUTE LOWER RESPIRATORY INFECTION (HCC): Primary | ICD-10-CM

## 2024-12-28 RX ORDER — PREDNISONE 20 MG/1
TABLET ORAL
Qty: 18 TABLET | Refills: 0 | Status: SHIPPED | OUTPATIENT
Start: 2024-12-28 | End: 2025-01-07

## 2024-12-28 RX ORDER — AZITHROMYCIN 250 MG/1
TABLET, FILM COATED ORAL
Qty: 6 TABLET | Refills: 0 | Status: SHIPPED | OUTPATIENT
Start: 2024-12-28 | End: 2025-01-07

## 2024-12-28 ASSESSMENT — ENCOUNTER SYMPTOMS
RHINORRHEA: 1
STRIDOR: 0
GASTROINTESTINAL NEGATIVE: 1
COUGH: 1
SHORTNESS OF BREATH: 1
EYES NEGATIVE: 1
CHEST TIGHTNESS: 1

## 2024-12-28 NOTE — PATIENT INSTRUCTIONS
Try to decrease smoking.  Take prednisone taper as directed take Zithromax which is an antibiotic.  You have a upper respiratory infection with COPD.  Rest.  Follow-up

## 2024-12-28 NOTE — PROGRESS NOTES
Natasha Flannery (:  1960) is a 64 y.o. female,New patient, here for evaluation of the following chief complaint(s):  Cough (Cough, chest congestion x3 days)      ASSESSMENT/PLAN:    ICD-10-CM    1. Chronic obstructive pulmonary disease with acute lower respiratory infection (HCC)  J44.0 azithromycin (ZITHROMAX) 250 MG tablet     predniSONE (DELTASONE) 20 MG tablet          Patient is a smoker COPD here not on oxygen presents with several days history of shortness of breath and a cough.  Patient states she has been real tight.  Not on any steroid at this time been using her inhaler without relief.  She is afebrile she does have wheezes on exam suspected COPD exacerbation of a URI.  Placed on prednisone and a Z-Adarsh.  Advised patient to slow down or stop smoking.  Patient understands.  Patient will follow-up.    Follow up in 7 days if symptoms persist or if symptoms worsen.    SUBJECTIVE/OBJECTIVE:   patient is a 64-year-old smoker with history of COPD on nebulizer treatment with shortness of breath and a cough for about 4 5 days.  Patient states around [employer] came over.  Probably got exposed.  Feels really congested and short of breath patient not on home oxygen.  Pulse ox about 94% on room air.  Patient has been using nebulizer still having wheezing.  There is no chest pain but cough sometimes have clear mucus.  Patient denies any other issue there is no fevers no chills.            Vitals:    24 1054 24 1104   BP: (!) 153/89 120/77   Site: Right Upper Arm    Position: Sitting    Cuff Size: Medium Adult    Pulse: (!) 103    Temp: 98 °F (36.7 °C)    TempSrc: Oral    SpO2: 94%    Weight: 47.2 kg (104 lb)    Height: 1.626 m (5' 4\")        Review of Systems   Constitutional: Negative.    HENT:  Positive for postnasal drip and rhinorrhea.    Eyes: Negative.    Respiratory:  Positive for cough, chest tightness and shortness of breath. Negative for stridor.    Cardiovascular: Negative.

## 2024-12-30 DIAGNOSIS — J44.9 COPD, SEVERE (HCC): ICD-10-CM

## 2024-12-30 RX ORDER — FLUTICASONE FUROATE, UMECLIDINIUM BROMIDE AND VILANTEROL TRIFENATATE 200; 62.5; 25 UG/1; UG/1; UG/1
1 POWDER RESPIRATORY (INHALATION) DAILY
Qty: 180 EACH | Refills: 3 | Status: SHIPPED | OUTPATIENT
Start: 2024-12-30

## 2025-01-08 ENCOUNTER — OFFICE VISIT (OUTPATIENT)
Dept: PULMONOLOGY | Age: 65
End: 2025-01-08

## 2025-01-08 VITALS
HEART RATE: 103 BPM | SYSTOLIC BLOOD PRESSURE: 148 MMHG | RESPIRATION RATE: 18 BRPM | WEIGHT: 105.2 LBS | DIASTOLIC BLOOD PRESSURE: 88 MMHG | HEIGHT: 64 IN | BODY MASS INDEX: 17.96 KG/M2 | OXYGEN SATURATION: 97 % | TEMPERATURE: 97.8 F

## 2025-01-08 DIAGNOSIS — Z72.0 TOBACCO ABUSE: ICD-10-CM

## 2025-01-08 DIAGNOSIS — Z87.891 PERSONAL HISTORY OF TOBACCO USE: ICD-10-CM

## 2025-01-08 DIAGNOSIS — J44.9 COPD, SEVERE (HCC): Primary | ICD-10-CM

## 2025-01-08 DIAGNOSIS — R91.1 LUNG NODULE: ICD-10-CM

## 2025-01-08 RX ORDER — FLUTICASONE FUROATE, UMECLIDINIUM BROMIDE AND VILANTEROL TRIFENATATE 200; 62.5; 25 UG/1; UG/1; UG/1
1 POWDER RESPIRATORY (INHALATION) DAILY
Qty: 3 EACH | Refills: 3 | Status: SHIPPED | OUTPATIENT
Start: 2025-01-08

## 2025-01-08 RX ORDER — ALBUTEROL SULFATE 90 UG/1
2 INHALANT RESPIRATORY (INHALATION) EVERY 4 HOURS PRN
Qty: 3 EACH | Refills: 3 | Status: SHIPPED | OUTPATIENT
Start: 2025-01-08

## 2025-01-08 RX ORDER — ALBUTEROL SULFATE 0.83 MG/ML
2.5 SOLUTION RESPIRATORY (INHALATION) EVERY 4 HOURS PRN
Qty: 120 ML | Refills: 11 | Status: SHIPPED | OUTPATIENT
Start: 2025-01-08

## 2025-01-08 NOTE — PROGRESS NOTES
Chief complaint  This is a 64 y.o. year old female  who comes to see me with a chief complaint of   Chief Complaint   Patient presents with    Follow-up    COPD       HPI  Here with a cc of COPD.      Went to urgent care last week for antibiotics and prednisone.  She is better.  Coughing now with less production.  Still with some symptoms but nothing like it was.  Remains on trelegy and albuterol.  She needs refills and is still smoking         Current Outpatient Medications:     fluticasone-umeclidin-vilant (TRELEGY ELLIPTA) 200-62.5-25 MCG/ACT AEPB inhaler, Inhale 1 puff into the lungs daily, Disp: 3 each, Rfl: 3    albuterol sulfate HFA (PROAIR HFA) 108 (90 Base) MCG/ACT inhaler, Inhale 2 puffs into the lungs every 4 hours as needed for Wheezing or Shortness of Breath, Disp: 3 each, Rfl: 3    albuterol (PROVENTIL) (2.5 MG/3ML) 0.083% nebulizer solution, Take 3 mLs by nebulization every 4 hours as needed for Wheezing, Disp: 120 mL, Rfl: 11    TRELEGY ELLIPTA 200-62.5-25 MCG/ACT AEPB inhaler, INHALE 1 PUFF INTO THE LUNGS DAILY, Disp: 180 each, Rfl: 3    vitamin D3 (CHOLECALCIFEROL) 25 MCG (1000 UT) TABS tablet, TAKE 1 TABLET BY MOUTH DAILY, Disp: 90 tablet, Rfl: 1    atorvastatin (LIPITOR) 80 MG tablet, TAKE ONE TABLET BY MOUTH DAILY, Disp: 90 tablet, Rfl: 1    fluticasone-umeclidin-vilant (TRELEGY ELLIPTA) 200-62.5-25 MCG/ACT AEPB inhaler, Inhale 1 puff into the lungs daily, Disp: 3 each, Rfl: 3    albuterol sulfate HFA (PROAIR HFA) 108 (90 Base) MCG/ACT inhaler, Inhale 2 puffs into the lungs every 4 hours as needed for Wheezing or Shortness of Breath, Disp: 3 each, Rfl: 3    aspirin (ASPIRIN CHILDRENS) 81 MG chewable tablet, Take 1 tablet by mouth daily, Disp: 30 tablet, Rfl: 3    albuterol (PROVENTIL) (2.5 MG/3ML) 0.083% nebulizer solution, Take 3 mLs by nebulization every 4 hours as needed for Wheezing, Disp: 120 mL, Rfl: 5    PHYSICAL EXAM:  Vitals:    01/08/25 0808   BP: (!) 148/88   Pulse: (!) 103

## 2025-01-08 NOTE — PATIENT INSTRUCTIONS
explain the results of your scan and answer any questions you may have. If you need any follow-up, he or she will help you understand what to do next.  After a lung cancer screening, you can go back to your usual activities right away.  A lung cancer screening test can't tell if you have lung cancer. If your results are positive, your doctor can't tell whether an abnormal finding is a harmless nodule, cancer, or something else without doing more tests.  What can you do to help prevent lung cancer?  Some lung cancers can't be prevented. But if you smoke, quitting smoking is the best step you can take to prevent lung cancer. If you want to quit, your doctor can recommend medicines or other ways to help.  Follow-up care is a key part of your treatment and safety. Be sure to make and go to all appointments, and call your doctor if you are having problems. It's also a good idea to know your test results and keep a list of the medicines you take.  Where can you learn more?  Go to https://www.[employer].net/patientEd and enter Q940 to learn more about \"Learning About Lung Cancer Screening.\"  Current as of: October 25, 2023  Content Version: 14.2  © 2024 [employer].   Care instructions adapted under license by [employer]. If you have questions about a medical condition or this instruction, always ask your healthcare professional. [employer] disclaims any warranty or liability for your use of this information.

## 2025-01-23 ENCOUNTER — HOSPITAL ENCOUNTER (OUTPATIENT)
Dept: CT IMAGING | Age: 65
Discharge: HOME | End: 2025-01-23
Attending: INTERNAL MEDICINE
Payer: MEDICARE

## 2025-01-23 DIAGNOSIS — Z87.891 PERSONAL HISTORY OF TOBACCO USE: ICD-10-CM

## 2025-01-23 DIAGNOSIS — J44.9 COPD, SEVERE (HCC): ICD-10-CM

## 2025-01-23 PROCEDURE — 71271 CT THORAX LUNG CANCER SCR C-: CPT

## 2025-03-07 ENCOUNTER — HOSPITAL ENCOUNTER (EMERGENCY)
Age: 65
Discharge: HOME | End: 2025-03-07
Attending: EMERGENCY MEDICINE
Payer: MEDICARE

## 2025-03-07 ENCOUNTER — APPOINTMENT (OUTPATIENT)
Dept: GENERAL RADIOLOGY | Age: 65
End: 2025-03-07
Payer: MEDICARE

## 2025-03-07 VITALS
SYSTOLIC BLOOD PRESSURE: 108 MMHG | HEART RATE: 96 BPM | TEMPERATURE: 98.4 F | RESPIRATION RATE: 20 BRPM | DIASTOLIC BLOOD PRESSURE: 66 MMHG | OXYGEN SATURATION: 93 %

## 2025-03-07 DIAGNOSIS — J44.1 COPD EXACERBATION (HCC): Primary | ICD-10-CM

## 2025-03-07 LAB
ALBUMIN SERPL-MCNC: 4.1 G/DL (ref 3.4–5)
ALBUMIN/GLOB SERPL: 1.3 {RATIO} (ref 1.1–2.2)
ALP SERPL-CCNC: 116 U/L (ref 40–129)
ALT SERPL-CCNC: 6 U/L (ref 10–40)
ANION GAP SERPL CALCULATED.3IONS-SCNC: 14 MMOL/L (ref 3–16)
AST SERPL-CCNC: 20 U/L (ref 15–37)
BASE EXCESS BLDV CALC-SCNC: 0.5 MMOL/L (ref -3–3)
BASOPHILS # BLD: 0 K/UL (ref 0–0.2)
BASOPHILS NFR BLD: 0.7 %
BILIRUB SERPL-MCNC: 0.3 MG/DL (ref 0–1)
BUN SERPL-MCNC: 7 MG/DL (ref 7–20)
CALCIUM SERPL-MCNC: 9.3 MG/DL (ref 8.3–10.6)
CHLORIDE SERPL-SCNC: 102 MMOL/L (ref 99–110)
CO2 BLDV-SCNC: 58 MMOL/L
CO2 SERPL-SCNC: 22 MMOL/L (ref 21–32)
COHGB MFR BLDV: 2.6 % (ref 0–1.5)
CREAT SERPL-MCNC: 0.7 MG/DL (ref 0.6–1.2)
DEPRECATED RDW RBC AUTO: 14.7 % (ref 12.4–15.4)
DO-HGB MFR BLDV: 6 %
EOSINOPHIL # BLD: 0 K/UL (ref 0–0.6)
EOSINOPHIL NFR BLD: 0.4 %
FLUAV RNA RESP QL NAA+PROBE: NOT DETECTED
FLUBV RNA RESP QL NAA+PROBE: NOT DETECTED
GFR SERPLBLD CREATININE-BSD FMLA CKD-EPI: >90 ML/MIN/{1.73_M2}
GLUCOSE SERPL-MCNC: 98 MG/DL (ref 70–99)
HCO3 BLDV-SCNC: 24.8 MMOL/L (ref 23–29)
HCT VFR BLD AUTO: 40.9 % (ref 36–48)
HGB BLD-MCNC: 13.7 G/DL (ref 12–16)
LACTATE BLDV-SCNC: 1.3 MMOL/L (ref 0.4–1.9)
LIPASE SERPL-CCNC: 29 U/L (ref 13–60)
LYMPHOCYTES # BLD: 0.9 K/UL (ref 1–5.1)
LYMPHOCYTES NFR BLD: 18.2 %
MCH RBC QN AUTO: 32.7 PG (ref 26–34)
MCHC RBC AUTO-ENTMCNC: 33.6 G/DL (ref 31–36)
MCV RBC AUTO: 97.3 FL (ref 80–100)
METHGB MFR BLDV: 0.2 %
MONOCYTES # BLD: 0.5 K/UL (ref 0–1.3)
MONOCYTES NFR BLD: 9.8 %
NEUTROPHILS # BLD: 3.4 K/UL (ref 1.7–7.7)
NEUTROPHILS NFR BLD: 70.9 %
O2 CT VFR BLDV CALC: 18 VOL %
O2 THERAPY: ABNORMAL
PCO2 BLDV: 38.2 MMHG (ref 40–50)
PH BLDV: 7.42 [PH] (ref 7.35–7.45)
PLATELET # BLD AUTO: 248 K/UL (ref 135–450)
PMV BLD AUTO: 8.2 FL (ref 5–10.5)
PO2 BLDV: 64.6 MMHG (ref 25–40)
POTASSIUM SERPL-SCNC: 3.7 MMOL/L (ref 3.5–5.1)
PROT SERPL-MCNC: 7.2 G/DL (ref 6.4–8.2)
RBC # BLD AUTO: 4.2 M/UL (ref 4–5.2)
SAO2 % BLDV: 93 %
SARS-COV-2 RNA RESP QL NAA+PROBE: NOT DETECTED
SODIUM SERPL-SCNC: 138 MMOL/L (ref 136–145)
TROPONIN, HIGH SENSITIVITY: 10 NG/L (ref 0–14)
TROPONIN, HIGH SENSITIVITY: 9 NG/L (ref 0–14)
WBC # BLD AUTO: 4.8 K/UL (ref 4–11)

## 2025-03-07 PROCEDURE — 2500000003 HC RX 250 WO HCPCS: Performed by: EMERGENCY MEDICINE

## 2025-03-07 PROCEDURE — 94640 AIRWAY INHALATION TREATMENT: CPT

## 2025-03-07 PROCEDURE — 6360000002 HC RX W HCPCS: Performed by: EMERGENCY MEDICINE

## 2025-03-07 PROCEDURE — 82803 BLOOD GASES ANY COMBINATION: CPT

## 2025-03-07 PROCEDURE — 71045 X-RAY EXAM CHEST 1 VIEW: CPT

## 2025-03-07 PROCEDURE — 87636 SARSCOV2 & INF A&B AMP PRB: CPT

## 2025-03-07 PROCEDURE — 80053 COMPREHEN METABOLIC PANEL: CPT

## 2025-03-07 PROCEDURE — 99285 EMERGENCY DEPT VISIT HI MDM: CPT

## 2025-03-07 PROCEDURE — 96374 THER/PROPH/DIAG INJ IV PUSH: CPT

## 2025-03-07 PROCEDURE — 36415 COLL VENOUS BLD VENIPUNCTURE: CPT

## 2025-03-07 PROCEDURE — 83690 ASSAY OF LIPASE: CPT

## 2025-03-07 PROCEDURE — 6370000000 HC RX 637 (ALT 250 FOR IP): Performed by: EMERGENCY MEDICINE

## 2025-03-07 PROCEDURE — 84484 ASSAY OF TROPONIN QUANT: CPT

## 2025-03-07 PROCEDURE — 85025 COMPLETE CBC W/AUTO DIFF WBC: CPT

## 2025-03-07 PROCEDURE — 93005 ELECTROCARDIOGRAM TRACING: CPT | Performed by: EMERGENCY MEDICINE

## 2025-03-07 PROCEDURE — 83605 ASSAY OF LACTIC ACID: CPT

## 2025-03-07 RX ORDER — IPRATROPIUM BROMIDE AND ALBUTEROL SULFATE 2.5; .5 MG/3ML; MG/3ML
1 SOLUTION RESPIRATORY (INHALATION) ONCE
Status: COMPLETED | OUTPATIENT
Start: 2025-03-07 | End: 2025-03-07

## 2025-03-07 RX ORDER — PREDNISONE 20 MG/1
40 TABLET ORAL DAILY
Qty: 8 TABLET | Refills: 0 | Status: SHIPPED | OUTPATIENT
Start: 2025-03-07 | End: 2025-03-11

## 2025-03-07 RX ORDER — AZITHROMYCIN 250 MG/1
TABLET, FILM COATED ORAL
Qty: 6 TABLET | Refills: 0 | Status: SHIPPED | OUTPATIENT
Start: 2025-03-07 | End: 2025-03-17

## 2025-03-07 RX ADMIN — WATER 125 MG: 1 INJECTION INTRAMUSCULAR; INTRAVENOUS; SUBCUTANEOUS at 21:12

## 2025-03-07 RX ADMIN — IPRATROPIUM BROMIDE AND ALBUTEROL SULFATE 1 DOSE: .5; 3 SOLUTION RESPIRATORY (INHALATION) at 21:42

## 2025-03-07 ASSESSMENT — PAIN SCALES - GENERAL: PAINLEVEL_OUTOF10: 4

## 2025-03-07 ASSESSMENT — LIFESTYLE VARIABLES
HOW MANY STANDARD DRINKS CONTAINING ALCOHOL DO YOU HAVE ON A TYPICAL DAY: PATIENT DOES NOT DRINK
HOW OFTEN DO YOU HAVE A DRINK CONTAINING ALCOHOL: NEVER

## 2025-03-07 ASSESSMENT — PAIN DESCRIPTION - LOCATION: LOCATION: CHEST

## 2025-03-08 LAB
EKG ATRIAL RATE: 105 BPM
EKG DIAGNOSIS: NORMAL
EKG P AXIS: 83 DEGREES
EKG P-R INTERVAL: 154 MS
EKG Q-T INTERVAL: 330 MS
EKG QRS DURATION: 68 MS
EKG QTC CALCULATION (BAZETT): 436 MS
EKG R AXIS: -25 DEGREES
EKG T AXIS: 82 DEGREES
EKG VENTRICULAR RATE: 105 BPM

## 2025-03-08 PROCEDURE — 93010 ELECTROCARDIOGRAM REPORT: CPT | Performed by: INTERNAL MEDICINE

## 2025-03-08 NOTE — ED PROVIDER NOTES
[facility] Emergency Department      Pt Name: Natasha Flannery  MRN: [id number]  Birthdate 1960  Date of evaluation: 3/7/2025  Provider: SUN WEST MD  CHIEF COMPLAINT  Chief Complaint   Patient presents with    Shortness of Breath     Patient with complaints of shortness of breath, chest pain, fever since this morning.      HPI  Natasha Flannery is a 65 y.o. female who presents because of difficulty breathing, fever, cough, chest pain.  Fever started to and was 101 at home.  She does not recall taking any tylenol.  She has needed to use her breathing treatments more than usual.  Pain is to the central chest.  It has been present for a couple days and is intermittent.  Denies n/v/d.  Did have some lower abdominal pain earlier today, currently gone.  She has a history of COPD.  She did check her sats at home.    REVIEW OF SYSTEMS:  fever, abdominal pain, no dysuria Pertinent positives and negatives as per the HPI.  All other pertinent review of systems reviewed and negative.  Nursing notes reviewed.    PAST MEDICAL HISTORY  Past Medical History:   Diagnosis Date    Abnormal screening mammogram 07/31/2017    Dx right pending    Anxiety     Arthritis     Asthma     Cholelithiases 03/17/2021    Colon polyps 10/30/2017    Multiple path pending 10.2017  Recheck 10.2020    Depression     Headache 08/21/2019    History of 2019 novel coronavirus disease (COVID-19) 02/08/2022    History of abnormal mammogram 07/31/2017    birads 3 right recheck 8.2018 9.2019 recheck 1 year 10.2020 birad 1    Hx of adenomatous colonic polyps 10/30/2017    adenoma 10.2017  Recheck 10.2020    MDD (recurrent major depressive disorder) in remission 03/11/2020    Multinodular thyroid 07/25/2017 7.2017 euthyroid recheck 1 year    Oral phase dysphagia 04/28/2021    Other osteoporosis without current pathological fracture 02/21/2020 2.2020  T-score of  -3.8 and a Z-score of -2.3.  spine; hip -3.0 and a Z-score of -1.7    Thyroid nodule     LACTATE, SEPSIS   LACTATE, SEPSIS   LIPASE   URINALYSIS WITH REFLEX TO CULTURE     EKG:  Read by me in the absence of a cardiologist shows:  ST, rate 105, intervals normal, axis left [place], NSSTTWA    RADIOLOGY:  Plain x-rays were viewed by me:   XR CHEST PORTABLE    (Results Pending)     ED COURSE:    Medications   ipratropium 0.5 mg-albuterol 2.5 mg (DUONEB) nebulizer solution 1 Dose (has no administration in time range)   methylPREDNISolone sodium succ (SOLU-MEDROL) 125 mg in sterile water 2 mL injection (has no administration in time range)     Vitals:    03/07/25 2045 03/07/25 2100   BP: 134/82 130/82   Pulse: (!) 105 (!) 111   Resp: 20 23   Temp: 98.4 °F (36.9 °C)    TempSrc: Oral    SpO2: 95% 100%     History from:  Patient  Limitations to history:  None  Chronic Conditions:  has a past medical history of Abnormal screening mammogram, Anxiety, Arthritis, Asthma, Cholelithiases, Colon polyps, Depression, Headache, History of 2019 novel coronavirus disease (COVID-19), History of abnormal mammogram, Hx of adenomatous colonic polyps, MDD (recurrent major depressive disorder) in remission, Multinodular thyroid, Oral phase dysphagia, Other osteoporosis without current pathological fracture, and Thyroid nodule.  Records Reviewed:  Outpatient notes  Disposition Considerations (Tests not ordered but considered, Shared Decision Making, Pt Expectation of Test or Tx.):  MR imaging not deemed clinically necessary in the ED setting    PROCEDURES:  None    CRITICAL CARE:  None    CONSULTATIONS:      Natasha Flannery is a 65 y.o. female who presented because of breathing difficulty.  ***  Differential Diagnosis: pneumonia, pneumothorax, PE, ACS, CHF, aspiration, other  New Prescriptions    No medications on file     FOLLOW UP:    No follow-up provider specified.  FINAL IMPRESSION:  No diagnosis found.    (Please note that I used voice recognition software to generate this note.  Occasionally words are mistranscribed despite my

## 2025-03-18 ENCOUNTER — OFFICE VISIT (OUTPATIENT)
Dept: FAMILY MEDICINE CLINIC | Age: 65
End: 2025-03-18
Payer: MEDICARE

## 2025-03-18 VITALS
DIASTOLIC BLOOD PRESSURE: 60 MMHG | HEART RATE: 98 BPM | BODY MASS INDEX: 17.75 KG/M2 | SYSTOLIC BLOOD PRESSURE: 106 MMHG | OXYGEN SATURATION: 98 % | HEIGHT: 64 IN | RESPIRATION RATE: 22 BRPM | WEIGHT: 104 LBS

## 2025-03-18 DIAGNOSIS — E78.2 MIXED HYPERLIPIDEMIA: ICD-10-CM

## 2025-03-18 DIAGNOSIS — F17.200 TOBACCO DEPENDENCE: ICD-10-CM

## 2025-03-18 DIAGNOSIS — J43.2 CENTRILOBULAR EMPHYSEMA (HCC): ICD-10-CM

## 2025-03-18 DIAGNOSIS — M81.8 OTHER OSTEOPOROSIS WITHOUT CURRENT PATHOLOGICAL FRACTURE: ICD-10-CM

## 2025-03-18 DIAGNOSIS — E55.9 VITAMIN D DEFICIENCY: ICD-10-CM

## 2025-03-18 DIAGNOSIS — I47.29 NSVT (NONSUSTAINED VENTRICULAR TACHYCARDIA) (HCC): ICD-10-CM

## 2025-03-18 DIAGNOSIS — Z00.00 MEDICARE ANNUAL WELLNESS VISIT, SUBSEQUENT: Primary | ICD-10-CM

## 2025-03-18 DIAGNOSIS — E04.2 MULTINODULAR THYROID: ICD-10-CM

## 2025-03-18 DIAGNOSIS — R73.9 ELEVATED BLOOD SUGAR: ICD-10-CM

## 2025-03-18 PROBLEM — Z86.16 HISTORY OF 2019 NOVEL CORONAVIRUS DISEASE (COVID-19): Status: RESOLVED | Noted: 2022-02-08 | Resolved: 2025-03-18

## 2025-03-18 PROCEDURE — 1159F MED LIST DOCD IN RCRD: CPT | Performed by: NURSE PRACTITIONER

## 2025-03-18 PROCEDURE — 1123F ACP DISCUSS/DSCN MKR DOCD: CPT | Performed by: NURSE PRACTITIONER

## 2025-03-18 PROCEDURE — 1160F RVW MEDS BY RX/DR IN RCRD: CPT | Performed by: NURSE PRACTITIONER

## 2025-03-18 PROCEDURE — G0439 PPPS, SUBSEQ VISIT: HCPCS | Performed by: NURSE PRACTITIONER

## 2025-03-18 RX ORDER — ATORVASTATIN CALCIUM 80 MG/1
TABLET, FILM COATED ORAL
Qty: 90 TABLET | Refills: 3 | Status: SHIPPED | OUTPATIENT
Start: 2025-03-18 | End: 2025-03-21

## 2025-03-18 RX ORDER — ASPIRIN 81 MG/1
81 TABLET, CHEWABLE ORAL DAILY
Qty: 30 TABLET | Refills: 3 | Status: SHIPPED | OUTPATIENT
Start: 2025-03-18

## 2025-03-18 RX ORDER — CHOLECALCIFEROL (VITAMIN D3) 25 MCG
1 TABLET ORAL DAILY
Qty: 90 TABLET | Refills: 3 | Status: SHIPPED | OUTPATIENT
Start: 2025-03-18

## 2025-03-18 SDOH — ECONOMIC STABILITY: FOOD INSECURITY: WITHIN THE PAST 12 MONTHS, YOU WORRIED THAT YOUR FOOD WOULD RUN OUT BEFORE YOU GOT MONEY TO BUY MORE.: NEVER TRUE

## 2025-03-18 SDOH — ECONOMIC STABILITY: FOOD INSECURITY: WITHIN THE PAST 12 MONTHS, THE FOOD YOU BOUGHT JUST DIDN'T LAST AND YOU DIDN'T HAVE MONEY TO GET MORE.: NEVER TRUE

## 2025-03-18 ASSESSMENT — PATIENT HEALTH QUESTIONNAIRE - PHQ9
SUM OF ALL RESPONSES TO PHQ QUESTIONS 1-9: 0
SUM OF ALL RESPONSES TO PHQ QUESTIONS 1-9: 0
1. LITTLE INTEREST OR PLEASURE IN DOING THINGS: NOT AT ALL
2. FEELING DOWN, DEPRESSED OR HOPELESS: NOT AT ALL
SUM OF ALL RESPONSES TO PHQ QUESTIONS 1-9: 0
SUM OF ALL RESPONSES TO PHQ QUESTIONS 1-9: 0

## 2025-03-18 NOTE — PROGRESS NOTES
Medicare Annual Wellness Visit    Natasha Flannery is here for Medicare AWV and Follow-up (ED follow up 3/7/2025, COPD exacerbation)    Assessment & Plan   Medicare annual wellness visit, subsequent  NSVT (nonsustained ventricular tachycardia) (HCC)  She is not seeing cardiology, no reference to this in the chart that could be found.  Likely resolved.  Centrilobular emphysema (HCC)  Stable  Currently doing well, taking medications as prescribed.  Multinodular thyroid  -     TSH reflex to FT4, FT3; Future  Other osteoporosis without current pathological fracture  -     Vitamin D 25 Hydroxy; Future  Elevated blood sugar  -     Hemoglobin A1C; Future  -     Comprehensive Metabolic Panel, Fasting; Future  -     CBC with Auto Differential; Future  Vitamin D deficiency  -     Vitamin D 25 Hydroxy; Future  -     vitamin D3 (CHOLECALCIFEROL) 25 MCG (1000 UT) TABS tablet; Take 1 tablet by mouth daily, Disp-90 tablet, R-3Normal  Mixed hyperlipidemia  -     Lipid, Fasting; Future  -     aspirin (ASPIRIN CHILDRENS) 81 MG chewable tablet; Take 1 tablet by mouth daily, Disp-30 tablet, R-3Normal  -     atorvastatin (LIPITOR) 80 MG tablet; TAKE ONE TABLET BY MOUTH DAILY, Disp-90 tablet, R-3Normal    9. Tobacco dependence  Unstable  Recommeded chantix and weaning method. She will trial weaning method.       Return in about 6 months (around 9/18/2025), or if symptoms worsen or fail to improve.     Subjective   The following acute and/or chronic problems were also addressed today:    NSVT: no record from this in chart.  Patient does not remember having any issues in the past.  Emphysema:  had a recent flair of COPD and was in the ER.  Seeing Dr. Trujillo in July, 2025. Taking all medications as prescribed. Feelimg much better.    Vaccines:  Flu: not interested  Pneumonia: will get from local pharmacy  RSV: will get from local pharmacy  Covid: not interested     Tobacco Use:  smoking .25 pack per day. She is wanting to quit smoking but feels

## 2025-03-18 NOTE — PATIENT INSTRUCTIONS
of the week.     Try to quit or cut back on using tobacco and other nicotine products. This includes smoking and vaping. If you need help quitting, talk to your doctor about stop-smoking programs and medicines. These can increase your chances of quitting for good. Quitting is one of the most important things you can do to protect your heart. It is never too late to quit. Try to avoid secondhand smoke too.     Stay at a weight that's healthy for you. Talk to your doctor if you need help losing weight.     Try to get 7 to 9 hours of sleep each night.     Limit alcohol to 2 drinks a day for men and 1 drink a day for women. Too much alcohol can cause health problems.     Manage other health problems such as diabetes, high blood pressure, and high cholesterol. If you think you may have a problem with alcohol or drug use, talk to your doctor.   Medicines    Take your medicines exactly as prescribed. Call your doctor if you think you are having a problem with your medicine.     If your doctor recommends aspirin, take the amount directed each day. Make sure you take aspirin and not another kind of pain reliever, such as acetaminophen (Tylenol).   When should you call for help?   Call 911 if you have symptoms of a heart attack. These may include:    Chest pain or pressure, or a strange feeling in the chest.     Sweating.     Shortness of breath.     Pain, pressure, or a strange feeling in the back, neck, jaw, or upper belly or in one or both shoulders or arms.     Lightheadedness or sudden weakness.     A fast or irregular heartbeat.   After you call 911, the  may tell you to chew 1 adult-strength or 2 to 4 low-dose aspirin. Wait for an ambulance. Do not try to drive yourself.  Watch closely for changes in your health, and be sure to contact your doctor if you have any problems.  Where can you learn more?  Go to https://www.healthwise.net/patientEd and enter F075 to learn more about \"A Healthy Heart: Care

## 2025-03-19 ENCOUNTER — HOSPITAL ENCOUNTER (OUTPATIENT)
Age: 65
Discharge: HOME | End: 2025-03-19
Payer: MEDICARE

## 2025-03-19 DIAGNOSIS — R73.9 ELEVATED BLOOD SUGAR: ICD-10-CM

## 2025-03-19 DIAGNOSIS — M81.8 OTHER OSTEOPOROSIS WITHOUT CURRENT PATHOLOGICAL FRACTURE: ICD-10-CM

## 2025-03-19 DIAGNOSIS — E55.9 VITAMIN D DEFICIENCY: ICD-10-CM

## 2025-03-19 DIAGNOSIS — E04.2 MULTINODULAR THYROID: ICD-10-CM

## 2025-03-19 DIAGNOSIS — E78.2 MIXED HYPERLIPIDEMIA: ICD-10-CM

## 2025-03-19 LAB
25(OH)D3 SERPL-MCNC: 40.3 NG/ML
ALBUMIN SERPL-MCNC: 3.9 G/DL (ref 3.4–5)
ALBUMIN/GLOB SERPL: 1.3 {RATIO} (ref 1.1–2.2)
ALP SERPL-CCNC: 96 U/L (ref 40–129)
ALT SERPL-CCNC: 6 U/L (ref 10–40)
ANION GAP SERPL CALCULATED.3IONS-SCNC: 11 MMOL/L (ref 3–16)
AST SERPL-CCNC: 22 U/L (ref 15–37)
BASOPHILS # BLD: 0 K/UL (ref 0–0.2)
BASOPHILS NFR BLD: 0.7 %
BILIRUB SERPL-MCNC: <0.2 MG/DL (ref 0–1)
BUN SERPL-MCNC: 7 MG/DL (ref 7–20)
CALCIUM SERPL-MCNC: 9.1 MG/DL (ref 8.3–10.6)
CHLORIDE SERPL-SCNC: 104 MMOL/L (ref 99–110)
CHOLEST SERPL-MCNC: 275 MG/DL (ref 0–199)
CO2 SERPL-SCNC: 26 MMOL/L (ref 21–32)
CREAT SERPL-MCNC: 0.8 MG/DL (ref 0.6–1.2)
DEPRECATED RDW RBC AUTO: 15 % (ref 12.4–15.4)
EOSINOPHIL # BLD: 0 K/UL (ref 0–0.6)
EOSINOPHIL NFR BLD: 0.6 %
EST. AVERAGE GLUCOSE BLD GHB EST-MCNC: 111.2 MG/DL
GFR SERPLBLD CREATININE-BSD FMLA CKD-EPI: 81 ML/MIN/{1.73_M2}
GLUCOSE P FAST SERPL-MCNC: 92 MG/DL (ref 70–99)
HBA1C MFR BLD: 5.5 %
HCT VFR BLD AUTO: 40.8 % (ref 36–48)
HDLC SERPL-MCNC: 56 MG/DL (ref 40–60)
HGB BLD-MCNC: 13.8 G/DL (ref 12–16)
LDL CHOLESTEROL: 188 MG/DL
LYMPHOCYTES # BLD: 1.4 K/UL (ref 1–5.1)
LYMPHOCYTES NFR BLD: 21.1 %
MCH RBC QN AUTO: 34 PG (ref 26–34)
MCHC RBC AUTO-ENTMCNC: 33.9 G/DL (ref 31–36)
MCV RBC AUTO: 100.5 FL (ref 80–100)
MONOCYTES # BLD: 0.5 K/UL (ref 0–1.3)
MONOCYTES NFR BLD: 7.5 %
NEUTROPHILS # BLD: 4.7 K/UL (ref 1.7–7.7)
NEUTROPHILS NFR BLD: 70.1 %
PLATELET # BLD AUTO: 320 K/UL (ref 135–450)
PMV BLD AUTO: 7.9 FL (ref 5–10.5)
POTASSIUM SERPL-SCNC: 3.8 MMOL/L (ref 3.5–5.1)
PROT SERPL-MCNC: 6.8 G/DL (ref 6.4–8.2)
RBC # BLD AUTO: 4.06 M/UL (ref 4–5.2)
SODIUM SERPL-SCNC: 141 MMOL/L (ref 136–145)
TRIGL SERPL-MCNC: 156 MG/DL (ref 0–150)
TSH SERPL DL<=0.005 MIU/L-ACNC: 0.81 UIU/ML (ref 0.27–4.2)
VLDLC SERPL CALC-MCNC: 31 MG/DL
WBC # BLD AUTO: 6.7 K/UL (ref 4–11)

## 2025-03-19 PROCEDURE — 84443 ASSAY THYROID STIM HORMONE: CPT

## 2025-03-19 PROCEDURE — 85025 COMPLETE CBC W/AUTO DIFF WBC: CPT

## 2025-03-19 PROCEDURE — 80061 LIPID PANEL: CPT

## 2025-03-19 PROCEDURE — 82306 VITAMIN D 25 HYDROXY: CPT

## 2025-03-19 PROCEDURE — 83036 HEMOGLOBIN GLYCOSYLATED A1C: CPT

## 2025-03-19 PROCEDURE — 80053 COMPREHEN METABOLIC PANEL: CPT

## 2025-03-19 PROCEDURE — 36415 COLL VENOUS BLD VENIPUNCTURE: CPT

## 2025-03-21 ENCOUNTER — RESULTS FOLLOW-UP (OUTPATIENT)
Dept: FAMILY MEDICINE CLINIC | Age: 65
End: 2025-03-21

## 2025-03-21 DIAGNOSIS — E78.00 HYPERCHOLESTEROLEMIA: Primary | ICD-10-CM

## 2025-03-21 RX ORDER — ROSUVASTATIN CALCIUM 20 MG/1
20 TABLET, COATED ORAL DAILY
Qty: 90 TABLET | Refills: 1 | Status: SHIPPED | OUTPATIENT
Start: 2025-03-21

## 2025-03-23 RX ORDER — ROSUVASTATIN CALCIUM 20 MG/1
20 TABLET, COATED ORAL DAILY
Qty: 90 TABLET | Refills: 1 | Status: SHIPPED | OUTPATIENT
Start: 2025-03-23

## 2025-04-08 ENCOUNTER — TELEPHONE (OUTPATIENT)
Dept: FAMILY MEDICINE CLINIC | Age: 65
End: 2025-04-08

## 2025-04-08 NOTE — TELEPHONE ENCOUNTER
Pt stopped into office today to   letter for handicap renewal.    Letter attached for Beatrice's signature.    Pt would like to  tomorrow.     Please call pt when ready for .   [phone]

## 2025-06-24 NOTE — PROGRESS NOTES
Patient arrived to unit by wheelchair. Oriented to room and call light. Four eyes and Admission questions completed. Belonging placed in patient belonging bag. Assessment completed. VSS. Patient awake, alert, and in bed. Medications given per MAR. Continuous fluids initiated. Safety and isolation precautions maintained. Call light within reach. Will continue to monitor. No

## 2025-07-21 ENCOUNTER — OFFICE VISIT (OUTPATIENT)
Dept: PULMONOLOGY | Age: 65
End: 2025-07-21
Payer: MEDICARE

## 2025-07-21 VITALS
BODY MASS INDEX: 17.69 KG/M2 | TEMPERATURE: 97.3 F | WEIGHT: 103.6 LBS | OXYGEN SATURATION: 94 % | HEIGHT: 64 IN | HEART RATE: 81 BPM | DIASTOLIC BLOOD PRESSURE: 62 MMHG | SYSTOLIC BLOOD PRESSURE: 110 MMHG | RESPIRATION RATE: 18 BRPM

## 2025-07-21 DIAGNOSIS — Z72.0 TOBACCO ABUSE: ICD-10-CM

## 2025-07-21 DIAGNOSIS — R91.1 LUNG NODULE: ICD-10-CM

## 2025-07-21 DIAGNOSIS — J44.9 COPD, SEVERE (HCC): Primary | ICD-10-CM

## 2025-07-21 DIAGNOSIS — Z87.891 PERSONAL HISTORY OF TOBACCO USE: ICD-10-CM

## 2025-07-21 PROCEDURE — 1159F MED LIST DOCD IN RCRD: CPT | Performed by: INTERNAL MEDICINE

## 2025-07-21 PROCEDURE — G0296 VISIT TO DETERM LDCT ELIG: HCPCS | Performed by: INTERNAL MEDICINE

## 2025-07-21 PROCEDURE — G2211 COMPLEX E/M VISIT ADD ON: HCPCS | Performed by: INTERNAL MEDICINE

## 2025-07-21 PROCEDURE — 99214 OFFICE O/P EST MOD 30 MIN: CPT | Performed by: INTERNAL MEDICINE

## 2025-07-21 PROCEDURE — 1123F ACP DISCUSS/DSCN MKR DOCD: CPT | Performed by: INTERNAL MEDICINE

## 2025-07-21 RX ORDER — ALBUTEROL SULFATE 0.83 MG/ML
2.5 SOLUTION RESPIRATORY (INHALATION) EVERY 4 HOURS PRN
Qty: 120 ML | Refills: 5 | Status: CANCELLED | OUTPATIENT
Start: 2025-07-21

## 2025-07-21 RX ORDER — FLUTICASONE FUROATE, UMECLIDINIUM BROMIDE AND VILANTEROL TRIFENATATE 200; 62.5; 25 UG/1; UG/1; UG/1
1 POWDER RESPIRATORY (INHALATION) DAILY
Qty: 3 EACH | Refills: 3 | Status: SHIPPED | OUTPATIENT
Start: 2025-07-21

## 2025-07-21 RX ORDER — ALBUTEROL SULFATE 90 UG/1
2 INHALANT RESPIRATORY (INHALATION) EVERY 4 HOURS PRN
Qty: 3 EACH | Refills: 3 | Status: SHIPPED | OUTPATIENT
Start: 2025-07-21

## 2025-07-21 NOTE — PROGRESS NOTES
Chief complaint  This is a 65 y.o. year old female  who comes to see me with a chief complaint of   Chief Complaint   Patient presents with    Follow-up    COPD       HPI  Here with a cc of COPD.      She is doing ok.  On trelegy and albuterol.  Will wheeze sometimes.  She is still smoking about 4-5 cigs per day (significant improvement from 2 ppd).  Everything seems ok otherwise.  She has tried patches but they made her smoke more         Current Outpatient Medications:     fluticasone-umeclidin-vilant (TRELEGY ELLIPTA) 200-62.5-25 MCG/ACT AEPB inhaler, Inhale 1 puff into the lungs daily, Disp: 3 each, Rfl: 3    albuterol sulfate HFA (PROVENTIL;VENTOLIN;PROAIR) 108 (90 Base) MCG/ACT inhaler, Inhale 2 puffs into the lungs every 4 hours as needed for Wheezing or Shortness of Breath (or cough), Disp: 3 each, Rfl: 3    rosuvastatin (CRESTOR) 20 MG tablet, Take 1 tablet by mouth daily, Disp: 90 tablet, Rfl: 1    rosuvastatin (CRESTOR) 20 MG tablet, Take 1 tablet by mouth daily, Disp: 90 tablet, Rfl: 1    aspirin (ASPIRIN CHILDRENS) 81 MG chewable tablet, Take 1 tablet by mouth daily, Disp: 30 tablet, Rfl: 3    vitamin D3 (CHOLECALCIFEROL) 25 MCG (1000 UT) TABS tablet, Take 1 tablet by mouth daily, Disp: 90 tablet, Rfl: 3    fluticasone-umeclidin-vilant (TRELEGY ELLIPTA) 200-62.5-25 MCG/ACT AEPB inhaler, Inhale 1 puff into the lungs daily, Disp: 3 each, Rfl: 3    albuterol sulfate HFA (PROAIR HFA) 108 (90 Base) MCG/ACT inhaler, Inhale 2 puffs into the lungs every 4 hours as needed for Wheezing or Shortness of Breath, Disp: 3 each, Rfl: 3    albuterol (PROVENTIL) (2.5 MG/3ML) 0.083% nebulizer solution, Take 3 mLs by nebulization every 4 hours as needed for Wheezing, Disp: 120 mL, Rfl: 11    TRELEGY ELLIPTA 200-62.5-25 MCG/ACT AEPB inhaler, INHALE 1 PUFF INTO THE LUNGS DAILY, Disp: 180 each, Rfl: 3    albuterol sulfate HFA (PROAIR HFA) 108 (90 Base) MCG/ACT inhaler, Inhale 2 puffs into the lungs every 4 hours as needed

## 2025-07-21 NOTE — PATIENT INSTRUCTIONS
Continue with inhalers    CT In January    Keep working on quitting tobacco    Follow up in 6 month  Learning About Lung Cancer Screening  What is screening for lung cancer?     Lung cancer screening is a way to find some lung cancers early, before a person has any symptoms of the cancer.  Lung cancer screening may help those who have the highest risk for lung cancer--people age 50 and older who are or were heavy smokers. For most people, who aren't at increased risk, screening for lung cancer probably isn't helpful.  Screening won't prevent cancer. And it may not find all lung cancers. Lung cancer screening may lower the risk of dying from lung cancer in a small number of people.  How is it done?  Lung cancer screening is done with a low-dose CT (computed tomography) scan. A CT scan uses X-rays, or radiation, to make detailed pictures of your body. Experts recommend that screening be done in medical centers that focus on finding and treating lung cancer.  Who is screening recommended for?  Lung cancer screening is recommended for people age 50 and older who are or were heavy smokers. That means people with a smoking history of at least 20 pack years. A pack year is a way to measure how heavy a smoker you are or were.  To figure out your pack years, multiply how many packs a day on average (assuming 20 cigarettes per pack) you have smoked by how many years you have smoked. For example:  If you smoked 1 pack a day for 20 years, that's 1 times 20. So you have a smoking history of 20 pack years.  If you smoked 2 packs a day for 10 years, that's 2 times 10. So you have a smoking history of 20 pack years.  Experts agree that screening is for people who have a high risk of lung cancer. But experts don't agree on what high risk means. Some say people age 50 or older with at least a 20-pack-year smoking history are high risk. Others say it's people age 55 or older with a 30-pack-year history.  To see if you could benefit